# Patient Record
Sex: FEMALE | Race: WHITE | NOT HISPANIC OR LATINO | Employment: UNEMPLOYED | URBAN - NONMETROPOLITAN AREA
[De-identification: names, ages, dates, MRNs, and addresses within clinical notes are randomized per-mention and may not be internally consistent; named-entity substitution may affect disease eponyms.]

---

## 2017-01-17 ENCOUNTER — OFFICE VISIT (OUTPATIENT)
Dept: ORTHOPEDIC SURGERY | Facility: CLINIC | Age: 27
End: 2017-01-17

## 2017-01-17 VITALS — BODY MASS INDEX: 35.28 KG/M2 | WEIGHT: 175 LBS | HEIGHT: 59 IN | RESPIRATION RATE: 16 BRPM

## 2017-01-17 DIAGNOSIS — R52 PAIN: Primary | ICD-10-CM

## 2017-01-17 DIAGNOSIS — S93.492A SPRAIN OF OTHER LIGAMENT OF LEFT ANKLE, INITIAL ENCOUNTER: ICD-10-CM

## 2017-01-17 PROCEDURE — 73610 X-RAY EXAM OF ANKLE: CPT | Performed by: PODIATRIST

## 2017-01-17 PROCEDURE — 73630 X-RAY EXAM OF FOOT: CPT | Performed by: PODIATRIST

## 2017-01-17 PROCEDURE — 99204 OFFICE O/P NEW MOD 45 MIN: CPT | Performed by: PODIATRIST

## 2017-01-17 RX ORDER — MELOXICAM 7.5 MG/1
7.5 TABLET ORAL DAILY
Qty: 30 TABLET | Refills: 2 | Status: SHIPPED | OUTPATIENT
Start: 2017-01-17 | End: 2017-02-22

## 2017-01-17 RX ORDER — CETIRIZINE HYDROCHLORIDE 10 MG/1
TABLET ORAL
Refills: 0 | COMMUNITY
Start: 2016-12-14 | End: 2022-01-21

## 2017-01-17 RX ORDER — METHYLPREDNISOLONE 4 MG/1
TABLET ORAL
Qty: 21 TABLET | Refills: 0 | Status: SHIPPED | OUTPATIENT
Start: 2017-01-17 | End: 2017-02-22

## 2017-01-17 NOTE — PROGRESS NOTES
Subjective Left ankle/foot pain  Patient ID: Enriqueta Gotti is a 26 y.o. female less states that she fell on her steps at home on 1/15/2017 she does remember exactly how she fell she does note her leg gave out and she fell down.  She states she gets a pinch or feeling in her but at times and her entire left leg gives out and she falls.  She was seen in the [facility] emergency department and given crutches and a boot and diclofenac.      History of Present Illness    Review of Systems   Constitutional: Negative for diaphoresis, fever and unexpected weight change.   HENT: Negative for dental problem and sore throat.    Eyes: Negative for visual disturbance.   Respiratory: Negative for shortness of breath.    Cardiovascular: Negative for chest pain.   Gastrointestinal: Negative for abdominal pain, constipation, diarrhea, nausea and vomiting.   Genitourinary: Negative for difficulty urinating and frequency.   Neurological: Negative for headaches.   Hematological: Does not bruise/bleed easily.   All other systems reviewed and are negative.      Past Medical History   Diagnosis Date   • Ankle sprain      right   • Anxiety    • Bipolar disorder    • Carpal tunnel syndrome    • Tear of meniscus of knee      left         Past Surgical History   Procedure Laterality Date   • Cholecystectomy     •  section     • Laparoscopic tubal ligation     • Carpal tunnel release     • Tonsillectomy and adenoidectomy         Allergies   Allergen Reactions   • Codeine    • Penicillins    • Sulfa Antibiotics        Objective   Physical Exam   Constitutional: She is oriented to person, place, and time. She appears well-developed and well-nourished.   HENT:   Head: Normocephalic.   Neurological: She is alert and oriented to person, place, and time.   Psychiatric: She has a normal mood and affect. Her behavior is normal.   Vitals reviewed.    Ortho Exam  Ortho Exam left lower extremity exam  Pedal pulses are  palpable, capillary refill time is brisk gross sensations intact  Manual muscle testing is 4 over 5 due to pain she's not tender over the anterior ankle joint or lateral gutter.  No pain or tenderness at the ATFL.  Her pain seems to be along the course of the peroneal tendons posterior to the fibula and distally towards the fifth metatarsal base.  She also has pain across the arch of the foot along the course of the peroneus longus tendon.  There is some slight pain and weakness with plantarflexion of the first ray.  Assessment/Plan  left ankle sprain, peroneal tendon injury  Independent Review of Radiographic Studies:    3 view x-rays of the left foot and ankle were taken today and compared to 2 days ago in the emergency department.  There is no bony abnormality.  No fracture.  No dislocation there is edema about the lateral foot and ankle on the left side.  Laboratory and Other Studies:     Medical Decision Making:        Procedures  [] No procedures were performed in office today.    Enriqueta was seen today for pain and pain.    Diagnoses and all orders for this visit:    Pain  -     XR Ankle 3+ View Left  -     XR Foot 3+ View Left    Sprain of other ligament of left ankle, initial encounter    Other orders  -     MethylPREDNISolone (MEDROL, LENORE,) 4 MG tablet; Use as directed by package instructions  -     meloxicam (MOBIC) 7.5 MG tablet; Take 1 tablet by mouth Daily.          Recommendations/Plan:  I discussed with her that we will cannot determine the fracture extent of injury to her left side without MRI.  I recommended we keep her in the boot.  Recommend compression with Tubigrip and anti-inflammatories and a Medrol Dosepak.  I want her to elevate AND ICE THIS much as possible.  I'm a keep her off work until next Tuesday and hopes that she can elevate and ice this and begin to recover.  I'll see her back in 2 weeks and reassess her then if there is no significant improvement consider MRI.    Return in about 2  weeks (around 1/31/2017).  Patient agreeable to call or return sooner for any concerns.

## 2017-01-17 NOTE — MR AVS SNAPSHOT
Enriqueta Orozcoch   1/17/2017 10:00 AM   Office Visit    Dept Phone:  [phone]   Encounter #:  [id number]    Provider:  Jax Tinoco DPM   Department:  [facility]                Your Full Care Plan              Today's Medication Changes          These changes are accurate as of: 1/17/17 10:27 AM.  If you have any questions, ask your nurse or doctor.               New Medication(s)Ordered:     meloxicam 7.5 MG tablet   Commonly known as:  MOBIC   Take 1 tablet by mouth Daily.   Started by:  Jax Tinoco DPM       MethylPREDNISolone 4 MG tablet   Commonly known as:  MEDROL (LENORE)   Use as directed by package instructions   Started by:  Jax Tinoco DPM            Where to Get Your Medications      These medications were sent to [address], [address] [phone]  - [phone] [address] [address]     Phone:  [phone]     meloxicam 7.5 MG tablet    MethylPREDNISolone 4 MG tablet                  Your Updated Medication List          This list is accurate as of: 1/17/17 10:27 AM.  Always use your most recent med list.                amitriptyline 25 MG tablet   Commonly known as:  ELAVIL       buPROPion  MG 24 hr tablet   Commonly known as:  WELLBUTRIN XL       cetirizine 10 MG tablet   Commonly known as:  zyrTEC       citalopram 40 MG tablet   Commonly known as:  CeleXA       hydrOXYzine 25 MG tablet   Commonly known as:  ATARAX       meloxicam 7.5 MG tablet   Commonly known as:  MOBIC   Take 1 tablet by mouth Daily.       MethylPREDNISolone 4 MG tablet   Commonly known as:  MEDROL (LENORE)   Use as directed by package instructions       naratriptan 1 MG tablet   Commonly known as:  AMERGE       promethazine 25 MG tablet   Commonly known as:  PHENERGAN       rizatriptan MLT 10 MG disintegrating tablet   Commonly known as:  MAXALT-MLT       SUMAtriptan 20 MG/ACT nasal spray   Commonly known as:  IMITREX               We Performed the Following     XR Ankle 3+ View Left     XR Foot 3+ View Left       You Were Diagnosed With        Codes Comments    Pain    -  Primary ICD-10-CM: R52  ICD-9-CM: 780.96     Sprain of other ligament of left ankle, initial encounter     ICD-10-CM: S93.492A  ICD-9-CM: 845.09       Instructions     None    Patient Instructions History      Upcoming Appointments     Visit Type Date Time Department    NEW PATIENT 2017 10:00 AM [facility]    OFFICE VISIT 2017  2:45 PM [facility]      [employer] Signup     [facility] OneSchool allows you to send messages to your doctor, view your test results, renew your prescriptions, schedule appointments, and more. To sign up, go to nPicker and click on the Sign Up Now link in the New User? box. Enter your OneSchool Activation Code exactly as it appears below along with the last four digits of your Social Security Number and your Date of Birth () to complete the sign-up process. If you do not sign up before the expiration date, you must request a new code.    OneSchool Activation Code: [email or web address]  Expires: 2017 10:25 AM    If you have questions, you can email [email or web address] or call [phone] to talk to our OneSchool staff. Remember, OneSchool is NOT to be used for urgent needs. For medical emergencies, dial 911.               Other Info from Your Visit           Your Appointments     2017  2:45 PM EST   Office Visit with Jax Tinoco DPM   [facility] (--)    [address] [id number] [phone]           Arrive 15 minutes prior to appointment.              Allergies     Codeine      Penicillins      Sulfa Antibiotics        Reason for Visit     Left Foot - Pain     Left Ankle - Pain           Vital Signs     Respirations Height Weight  "Body Mass Index Smoking Status       16 59\" (149.9 cm) 175 lb (79.4 kg) 35.35 kg/m2 Current Every Day Smoker       Problems and Diagnoses Noted     Pain    -  Primary    Sprain of other ligament of left ankle, initial encounter            "

## 2017-01-31 ENCOUNTER — OFFICE VISIT (OUTPATIENT)
Dept: ORTHOPEDIC SURGERY | Facility: CLINIC | Age: 27
End: 2017-01-31

## 2017-01-31 VITALS — BODY MASS INDEX: 34.88 KG/M2 | WEIGHT: 173 LBS | RESPIRATION RATE: 20 BRPM | HEIGHT: 59 IN

## 2017-01-31 DIAGNOSIS — S93.492A SPRAIN OF OTHER LIGAMENT OF LEFT ANKLE, INITIAL ENCOUNTER: Primary | ICD-10-CM

## 2017-01-31 PROCEDURE — 99213 OFFICE O/P EST LOW 20 MIN: CPT | Performed by: PODIATRIST

## 2017-01-31 RX ORDER — PREDNISONE 20 MG/1
TABLET ORAL
COMMUNITY
Start: 2016-12-14 | End: 2017-02-22

## 2017-01-31 RX ORDER — ONDANSETRON 4 MG/1
TABLET, FILM COATED ORAL
COMMUNITY
Start: 2016-11-23 | End: 2017-02-22

## 2017-01-31 RX ORDER — FLUTICASONE PROPIONATE 50 MCG
SPRAY, SUSPENSION (ML) NASAL
COMMUNITY
Start: 2016-12-14 | End: 2017-02-22

## 2017-01-31 RX ORDER — ERGOCALCIFEROL 1.25 MG/1
CAPSULE ORAL
COMMUNITY
Start: 2017-01-26 | End: 2022-02-09

## 2017-01-31 RX ORDER — AZITHROMYCIN 500 MG/1
TABLET, FILM COATED ORAL
COMMUNITY
Start: 2016-12-12 | End: 2017-02-22

## 2017-01-31 RX ORDER — CEPHALEXIN 500 MG/1
CAPSULE ORAL
COMMUNITY
Start: 2016-12-14 | End: 2017-02-22

## 2017-01-31 NOTE — MR AVS SNAPSHOT
Enriqueta Gotti   1/31/2017 2:45 PM   Office Visit    Dept Phone:  [phone]   Encounter #:  [id number]    Provider:  Jax Tinoco DPM   Department:  [facility] MEDICINE                Your Full Care Plan              Your Updated Medication List          This list is accurate as of: 1/31/17  3:09 PM.  Always use your most recent med list.                amitriptyline 25 MG tablet   Commonly known as:  ELAVIL       azithromycin 500 MG tablet   Commonly known as:  ZITHROMAX       buPROPion  MG 24 hr tablet   Commonly known as:  WELLBUTRIN XL       cephalexin 500 MG capsule   Commonly known as:  KEFLEX       cetirizine 10 MG tablet   Commonly known as:  zyrTEC       citalopram 40 MG tablet   Commonly known as:  CeleXA       diclofenac 50 MG EC tablet   Commonly known as:  VOLTAREN       fluticasone 50 MCG/ACT nasal spray   Commonly known as:  FLONASE       hydrOXYzine 25 MG tablet   Commonly known as:  ATARAX       meloxicam 7.5 MG tablet   Commonly known as:  MOBIC   Take 1 tablet by mouth Daily.       MethylPREDNISolone 4 MG tablet   Commonly known as:  MEDROL (LENORE)   Use as directed by package instructions       naratriptan 1 MG tablet   Commonly known as:  AMERGE       ondansetron 4 MG tablet   Commonly known as:  ZOFRAN       predniSONE 20 MG tablet   Commonly known as:  DELTASONE       promethazine 25 MG tablet   Commonly known as:  PHENERGAN       rizatriptan MLT 10 MG disintegrating tablet   Commonly known as:  MAXALT-MLT       SUMAtriptan 20 MG/ACT nasal spray   Commonly known as:  IMITREX       vitamin D 25123 UNITS capsule capsule   Commonly known as:  ERGOCALCIFEROL               You Were Diagnosed With        Codes Comments    Sprain of other ligament of left ankle, initial encounter    -  Primary ICD-10-CM: S93.492A  ICD-9-CM: 845.09       Instructions     None    Patient Instructions History      Upcoming Appointments   "   Visit Type Date Time Department    OFFICE VISIT 2017  2:45 PM [facility]    OFFICE VISIT 2017  1:15 PM [facility]      MyChart Signup     [facility] Whats[employer]x allows you to send messages to your doctor, view your test results, renew your prescriptions, schedule appointments, and more. To sign up, go to Musicane and click on the Sign Up Now link in the New User? box. Enter your Whats[employer] Activation Code exactly as it appears below along with the last four digits of your Social Security Number and your Date of Birth () to complete the sign-up process. If you do not sign up before the expiration date, you must request a new code.    What[employer] Activation Code: [email or web address]  Expires: 2017  3:07 PM    If you have questions, you can email [email or web address] or call [phone] to talk to our Whats[employer] staff. Remember, WhatsN[employer] is NOT to be used for urgent needs. For medical emergencies, dial 911.               Other Info from Your Visit           Your Appointments     2017  1:15 PM EST   Office Visit with Jax Tinoco DPM   [facility] (--)    [address] [place] [address] [phone]           Arrive 15 minutes prior to appointment.              Allergies     Codeine      Penicillins      Sulfa Antibiotics        Reason for Visit     Left Foot - Follow-up, Pain left ankle sprain, peroneal tendon injury      Vital Signs     Respirations Height Weight Body Mass Index Smoking Status       20 59\" (149.9 cm) 173 lb (78.5 kg) 34.94 kg/m2 Current Every Day Smoker       Problems and Diagnoses Noted     Sprain of other ligament of left ankle, initial encounter    -  Primary        "

## 2017-01-31 NOTE — PROGRESS NOTES
Subjective   Patient ID: Enriqueta Gotti is a 26 y.o. female   Follow-up and Pain of the Left Foot (left ankle sprain, peroneal tendon injury)   he comes in with her boot and Tubigrip today on the left lower extremity.  She states it feels somewhat better but she's also been working 12 and 14 hour shifts so as not completely better and difficult to tell.  She's very sleepy and drowsy from work today so getting a good physical exam out of her somewhat difficult as well.    History of Present Illness    Review of Systems   Constitutional: Negative for diaphoresis, fever and unexpected weight change.   HENT: Negative for dental problem and sore throat.    Eyes: Negative for visual disturbance.   Respiratory: Negative for shortness of breath.    Cardiovascular: Negative for chest pain.   Gastrointestinal: Negative for abdominal pain, constipation, diarrhea, nausea and vomiting.   Genitourinary: Negative for difficulty urinating and frequency.   Musculoskeletal: Positive for arthralgias.   Neurological: Negative for headaches.   Hematological: Does not bruise/bleed easily.   All other systems reviewed and are negative.      Past Medical History   Diagnosis Date   • Ankle sprain      right   • Anxiety    • Bipolar disorder    • Carpal tunnel syndrome    • Tear of meniscus of knee      left         Past Surgical History   Procedure Laterality Date   • Cholecystectomy     •  section     • Laparoscopic tubal ligation     • Carpal tunnel release     • Tonsillectomy and adenoidectomy         Allergies   Allergen Reactions   • Codeine    • Penicillins    • Sulfa Antibiotics        Objective  AAO ×3, NAD, AF VSS  Physical Exam   Constitutional: She is oriented to person, place, and time. She appears well-developed and well-nourished.   HENT:   Head: Normocephalic and atraumatic.   Eyes: EOM are normal. Pupils are equal, round, and reactive to light.   Neck: Normal range of motion.   Cardiovascular: Normal rate.     Pulmonary/Chest: Effort normal.   Abdominal: Soft. Bowel sounds are normal.   Musculoskeletal: Normal range of motion.   Neurological: She is alert and oriented to person, place, and time. She has normal reflexes.   Skin: Skin is warm.   Psychiatric: She has a normal mood and affect. Her behavior is normal. Judgment and thought content normal.     Ortho Exam  Ortho Exam  Left Lower extremity exam:: She doesn't seem significantly tender to palpation in the lateral gutter and ATFL today.  There is mild discomfort with range of motion.  Only minimal discomfort with anterior drawer and increased inversion test.  Overall she has some decreased edema and swelling and appears to be somewhat better today.  Ulcer palpable, gross sensations intact, reflexes are normal she is also minimally tender along the course of the peroneals    Assessment/Plan  resolving left ankle sprain of the ATFL, hopeful resolving peroneal tendon injury  Independent Review of Radiographic Studies:      Laboratory and Other Studies:     Medical Decision Making:        Procedures  [] No procedures were performed in office today.    Enriqueta was seen today for follow-up and pain.    Diagnoses and all orders for this visit:    Sprain of other ligament of left ankle, initial encounter        Recommendations/Plan:  I think she is potentially getting better but the fact that she is working so much is also fighting against this.  We discussed that ideally should have a few days off and get her rest this but that's not possible.  She's going to continue working in her boot.  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  We discussed that it does seem like it's a little bit better and improving so as long she is getting better we'll hold off on MRI for just yet I'll see her back in about 23 weeks and see how she's doing at that point and then consider MRI if needed.    Return in about 3 weeks (around 2/21/2017).  Patient agreeable to call or return sooner for any  concerns.

## 2017-02-01 ENCOUNTER — HOSPITAL ENCOUNTER (EMERGENCY)
Facility: HOSPITAL | Age: 27
Discharge: HOME | End: 2017-02-01
Attending: EMERGENCY MEDICINE | Admitting: EMERGENCY MEDICINE

## 2017-02-01 VITALS
WEIGHT: 175 LBS | BODY MASS INDEX: 35.28 KG/M2 | TEMPERATURE: 98.8 F | HEART RATE: 90 BPM | DIASTOLIC BLOOD PRESSURE: 52 MMHG | HEIGHT: 59 IN | RESPIRATION RATE: 18 BRPM | OXYGEN SATURATION: 99 % | SYSTOLIC BLOOD PRESSURE: 92 MMHG

## 2017-02-01 DIAGNOSIS — A08.4 VIRAL GASTROENTERITIS: Primary | ICD-10-CM

## 2017-02-01 LAB
ALBUMIN SERPL-MCNC: 3.9 G/DL (ref 3.5–5)
ALBUMIN/GLOB SERPL: 1.3 G/DL (ref 1.5–2.5)
ALP SERPL-CCNC: 90 U/L (ref 46–116)
ALT SERPL W P-5'-P-CCNC: 126 U/L (ref 10–36)
AMYLASE SERPL-CCNC: 37 U/L (ref 28–100)
ANION GAP SERPL CALCULATED.3IONS-SCNC: 5.4 MMOL/L (ref 3.6–11.2)
AST SERPL-CCNC: 120 U/L (ref 10–30)
B-HCG UR QL: NEGATIVE
BACTERIA UR QL AUTO: ABNORMAL /HPF
BASOPHILS # BLD AUTO: 0.03 10*3/MM3 (ref 0–0.3)
BASOPHILS NFR BLD AUTO: 0.3 % (ref 0–2)
BILIRUB SERPL-MCNC: 0.6 MG/DL (ref 0.2–1.8)
BILIRUB UR QL STRIP: NEGATIVE
BUN BLD-MCNC: 10 MG/DL (ref 7–21)
BUN/CREAT SERPL: 11.8 (ref 7–25)
CALCIUM SPEC-SCNC: 8.7 MG/DL (ref 7.7–10)
CHLORIDE SERPL-SCNC: 109 MMOL/L (ref 99–112)
CLARITY UR: ABNORMAL
CO2 SERPL-SCNC: 28.6 MMOL/L (ref 24.3–31.9)
COLOR UR: ABNORMAL
CREAT BLD-MCNC: 0.85 MG/DL (ref 0.43–1.29)
DEPRECATED RDW RBC AUTO: 41.5 FL (ref 37–54)
EOSINOPHIL # BLD AUTO: 0.1 10*3/MM3 (ref 0–0.7)
EOSINOPHIL NFR BLD AUTO: 1 % (ref 0–5)
ERYTHROCYTE [DISTWIDTH] IN BLOOD BY AUTOMATED COUNT: 13.2 % (ref 11.5–14.5)
GFR SERPL CREATININE-BSD FRML MDRD: 80 ML/MIN/1.73
GLOBULIN UR ELPH-MCNC: 3.1 GM/DL
GLUCOSE BLD-MCNC: 88 MG/DL (ref 70–110)
GLUCOSE UR STRIP-MCNC: NEGATIVE MG/DL
HCT VFR BLD AUTO: 42.4 % (ref 37–47)
HGB BLD-MCNC: 13.7 G/DL (ref 12–16)
HGB UR QL STRIP.AUTO: ABNORMAL
HYALINE CASTS UR QL AUTO: ABNORMAL /LPF
IMM GRANULOCYTES # BLD: 0.02 10*3/MM3 (ref 0–0.03)
IMM GRANULOCYTES NFR BLD: 0.2 % (ref 0–0.5)
KETONES UR QL STRIP: NEGATIVE
LEUKOCYTE ESTERASE UR QL STRIP.AUTO: ABNORMAL
LIPASE SERPL-CCNC: 33 U/L (ref 13–60)
LYMPHOCYTES # BLD AUTO: 2.66 10*3/MM3 (ref 1–3)
LYMPHOCYTES NFR BLD AUTO: 27.6 % (ref 21–51)
MCH RBC QN AUTO: 28.2 PG (ref 27–33)
MCHC RBC AUTO-ENTMCNC: 32.3 G/DL (ref 33–37)
MCV RBC AUTO: 87.2 FL (ref 80–94)
MONOCYTES # BLD AUTO: 0.82 10*3/MM3 (ref 0.1–0.9)
MONOCYTES NFR BLD AUTO: 8.5 % (ref 0–10)
NEUTROPHILS # BLD AUTO: 6.01 10*3/MM3 (ref 1.4–6.5)
NEUTROPHILS NFR BLD AUTO: 62.4 % (ref 30–70)
NITRITE UR QL STRIP: NEGATIVE
OSMOLALITY SERPL CALC.SUM OF ELEC: 283.4 MOSM/KG (ref 273–305)
PH UR STRIP.AUTO: <=5 [PH] (ref 5–8)
PLATELET # BLD AUTO: 245 10*3/MM3 (ref 130–400)
PMV BLD AUTO: 10.4 FL (ref 6–10)
POTASSIUM BLD-SCNC: 3.9 MMOL/L (ref 3.5–5.3)
PROT SERPL-MCNC: 7 G/DL (ref 6–8)
PROT UR QL STRIP: NEGATIVE
RBC # BLD AUTO: 4.86 10*6/MM3 (ref 4.2–5.4)
RBC # UR: ABNORMAL /HPF
REF LAB TEST METHOD: ABNORMAL
SODIUM BLD-SCNC: 143 MMOL/L (ref 135–153)
SP GR UR STRIP: 1.03 (ref 1–1.03)
SQUAMOUS #/AREA URNS HPF: ABNORMAL /HPF
UROBILINOGEN UR QL STRIP: ABNORMAL
WBC NRBC COR # BLD: 9.64 10*3/MM3 (ref 4.5–12.5)
WBC UR QL AUTO: ABNORMAL /HPF

## 2017-02-01 PROCEDURE — 80053 COMPREHEN METABOLIC PANEL: CPT | Performed by: PHYSICIAN ASSISTANT

## 2017-02-01 PROCEDURE — 82150 ASSAY OF AMYLASE: CPT | Performed by: PHYSICIAN ASSISTANT

## 2017-02-01 PROCEDURE — 81025 URINE PREGNANCY TEST: CPT | Performed by: PHYSICIAN ASSISTANT

## 2017-02-01 PROCEDURE — 85025 COMPLETE CBC W/AUTO DIFF WBC: CPT | Performed by: PHYSICIAN ASSISTANT

## 2017-02-01 PROCEDURE — 83690 ASSAY OF LIPASE: CPT | Performed by: PHYSICIAN ASSISTANT

## 2017-02-01 PROCEDURE — 36415 COLL VENOUS BLD VENIPUNCTURE: CPT

## 2017-02-01 PROCEDURE — 96365 THER/PROPH/DIAG IV INF INIT: CPT

## 2017-02-01 PROCEDURE — 87086 URINE CULTURE/COLONY COUNT: CPT | Performed by: PHYSICIAN ASSISTANT

## 2017-02-01 PROCEDURE — 99283 EMERGENCY DEPT VISIT LOW MDM: CPT

## 2017-02-01 PROCEDURE — 96375 TX/PRO/DX INJ NEW DRUG ADDON: CPT

## 2017-02-01 PROCEDURE — 81001 URINALYSIS AUTO W/SCOPE: CPT | Performed by: PHYSICIAN ASSISTANT

## 2017-02-01 PROCEDURE — 25010000002 PROMETHAZINE PER 50 MG: Performed by: PHYSICIAN ASSISTANT

## 2017-02-01 RX ORDER — PANTOPRAZOLE SODIUM 40 MG/10ML
40 INJECTION, POWDER, LYOPHILIZED, FOR SOLUTION INTRAVENOUS ONCE
Status: COMPLETED | OUTPATIENT
Start: 2017-02-01 | End: 2017-02-01

## 2017-02-01 RX ORDER — SODIUM CHLORIDE 0.9 % (FLUSH) 0.9 %
10 SYRINGE (ML) INJECTION AS NEEDED
Status: DISCONTINUED | OUTPATIENT
Start: 2017-02-01 | End: 2017-02-01 | Stop reason: HOSPADM

## 2017-02-01 RX ORDER — ONDANSETRON 4 MG/1
4 TABLET, ORALLY DISINTEGRATING ORAL EVERY 6 HOURS PRN
Qty: 30 TABLET | Refills: 0 | Status: SHIPPED | OUTPATIENT
Start: 2017-02-01 | End: 2017-02-22

## 2017-02-01 RX ADMIN — PROMETHAZINE HYDROCHLORIDE 25 MG: 25 INJECTION INTRAMUSCULAR; INTRAVENOUS at 16:50

## 2017-02-01 RX ADMIN — SODIUM CHLORIDE 1000 ML: 9 INJECTION, SOLUTION INTRAVENOUS at 16:50

## 2017-02-01 RX ADMIN — PANTOPRAZOLE SODIUM 40 MG: 40 INJECTION, POWDER, FOR SOLUTION INTRAVENOUS at 16:51

## 2017-02-01 NOTE — ED NOTES
Pt resting quietly on stretcher with no complaints.  Pt AAOx4 with no resp distress noted.  Pt denies any needs at this time.  Skin PWD.  Pt family at bedside. Will continue to monitor and follow plan of care.     Afshan Newman RN  02/01/17 9046

## 2017-02-01 NOTE — ED NOTES
Pt discharged home with family, by wheelchair, AAOx3 with NADN.       Afshan Newman RN  02/01/17 6229

## 2017-02-01 NOTE — ED PROVIDER NOTES
Subjective   Patient is a 26 y.o. female presenting with abdominal pain.   History provided by:  Patient  Abdominal Pain   Pain location:  Epigastric and suprapubic  Pain quality: gnawing    Pain radiates to:  Does not radiate  Pain severity:  Mild  Onset quality:  Sudden  Duration:  1 day  Timing:  Constant  Progression:  Waxing and waning  Chronicity:  New  Relieved by:  None tried  Ineffective treatments:  None tried  Associated symptoms: diarrhea, nausea and vomiting    Associated symptoms: no chest pain, no dysuria and no fever        Review of Systems   Constitutional: Negative.  Negative for fever.   HENT: Negative.    Respiratory: Negative.    Cardiovascular: Negative.  Negative for chest pain.   Gastrointestinal: Positive for abdominal pain, diarrhea, nausea and vomiting.   Endocrine: Negative.    Genitourinary: Negative.  Negative for dysuria.   Skin: Negative.    Neurological: Negative.    Psychiatric/Behavioral: Negative.    All other systems reviewed and are negative.      Past Medical History   Diagnosis Date   • Ankle sprain      right   • Anxiety    • Bipolar disorder    • Carpal tunnel syndrome    • Tear of meniscus of knee      left        Allergies   Allergen Reactions   • Codeine    • Penicillins    • Sulfa Antibiotics        Past Surgical History   Procedure Laterality Date   • Cholecystectomy     •  section     • Laparoscopic tubal ligation     • Carpal tunnel release     • Tonsillectomy and adenoidectomy         Family History   Problem Relation Age of Onset   • Osteoarthritis Other    • Osteoporosis Other    • Heart disease Other    • Hypertension Other    • Asthma Other    • Diabetes Other    • Kidney disease Other    • Stroke Other        Social History     Social History   • Marital status:      Spouse name: N/A   • Number of children: N/A   • Years of education: N/A     Social History Main Topics   • Smoking status: Current Every Day Smoker     Packs/day: 0.50     Years:  1.00     Types: Cigarettes     Start date: 1/17/2016   • Smokeless tobacco: None   • Alcohol use Yes      Comment: rarely   • Drug use: No   • Sexual activity: Defer     Other Topics Concern   • None     Social History Narrative           Objective   Physical Exam   Constitutional: She is oriented to person, place, and time. She appears well-developed and well-nourished. No distress.   HENT:   Head: Normocephalic and atraumatic.   Nose: Nose normal.   Eyes: Conjunctivae and EOM are normal. Pupils are equal, round, and reactive to light.   Neck: Normal range of motion. Neck supple. No JVD present. No tracheal deviation present.   Cardiovascular: Normal rate, regular rhythm and normal heart sounds.    No murmur heard.  Pulmonary/Chest: Effort normal and breath sounds normal. No respiratory distress. She has no wheezes.   Abdominal: Soft. Bowel sounds are normal. There is tenderness.   Epigastric / suprapubic.    Musculoskeletal: Normal range of motion. She exhibits no edema or deformity.   Neurological: She is alert and oriented to person, place, and time. No cranial nerve deficit.   Skin: Skin is warm and dry. No rash noted. She is not diaphoretic. No erythema. No pallor.   Psychiatric: She has a normal mood and affect. Her behavior is normal. Thought content normal.   Nursing note and vitals reviewed.      Procedures         ED Course  ED Course                  MDM  Number of Diagnoses or Management Options  Viral gastroenteritis: minor     Amount and/or Complexity of Data Reviewed  Clinical lab tests: ordered and reviewed    Risk of Complications, Morbidity, and/or Mortality  Presenting problems: low  Diagnostic procedures: low  Management options: low    Patient Progress  Patient progress: stable      Final diagnoses:   Viral gastroenteritis            PUJA Richards  02/01/17 9350

## 2017-02-01 NOTE — ED NOTES
Pt reports that she has had n/v/d with abd pain x a couple days.  Pt family at bedside.     Afshan Newman RN  02/01/17 1287

## 2017-02-04 LAB — BACTERIA SPEC AEROBE CULT: NORMAL

## 2017-02-22 ENCOUNTER — OFFICE VISIT (OUTPATIENT)
Dept: PSYCHIATRY | Facility: CLINIC | Age: 27
End: 2017-02-22

## 2017-02-22 VITALS
WEIGHT: 180 LBS | SYSTOLIC BLOOD PRESSURE: 122 MMHG | BODY MASS INDEX: 37.79 KG/M2 | DIASTOLIC BLOOD PRESSURE: 83 MMHG | HEIGHT: 58 IN | HEART RATE: 84 BPM

## 2017-02-22 DIAGNOSIS — Z79.899 LONG TERM USE OF DRUG: Primary | ICD-10-CM

## 2017-02-22 DIAGNOSIS — F60.9 PERSONALITY DISORDER, UNSPECIFIED (HCC): ICD-10-CM

## 2017-02-22 DIAGNOSIS — F41.9 ANXIETY DISORDER, UNSPECIFIED TYPE: ICD-10-CM

## 2017-02-22 DIAGNOSIS — F33.1 MAJOR DEPRESSIVE DISORDER, RECURRENT EPISODE, MODERATE (HCC): ICD-10-CM

## 2017-02-22 LAB
AMPHETAMINE CUT-OFF: NORMAL
BENZODIAZIPINE CUT-OFF: NORMAL
BUPRENORPHINE CUT-OFF: NORMAL
COCAINE CUT-OFF: NORMAL
EXTERNAL AMPHETAMINE SCREEN URINE: NEGATIVE
EXTERNAL BENZODIAZEPINE SCREEN URINE: NEGATIVE
EXTERNAL BUPRENORPHINE SCREEN URINE: NEGATIVE
EXTERNAL COCAINE SCREEN URINE: NEGATIVE
EXTERNAL MDMA: NEGATIVE
EXTERNAL METHADONE SCREEN URINE: NEGATIVE
EXTERNAL METHAMPHETAMINE SCREEN URINE: NEGATIVE
EXTERNAL OPIATES SCREEN URINE: NEGATIVE
EXTERNAL OXYCODONE SCREEN URINE: NEGATIVE
EXTERNAL THC SCREEN URINE: NEGATIVE
MDMA CUT-OFF: NORMAL
METHADONE CUT-OFF: NORMAL
METHAMPHETAMINE CUT-OFF: NORMAL
OPIATES CUT-OFF: NORMAL
OXYCODONE CUT-OFF: NORMAL
THC CUT-OFF: NORMAL

## 2017-02-22 PROCEDURE — 90792 PSYCH DIAG EVAL W/MED SRVCS: CPT | Performed by: NURSE PRACTITIONER

## 2017-02-22 RX ORDER — MIRTAZAPINE 15 MG/1
15 TABLET, FILM COATED ORAL NIGHTLY
Qty: 30 TABLET | Refills: 0 | Status: SHIPPED | OUTPATIENT
Start: 2017-02-22 | End: 2017-03-21

## 2017-02-22 RX ORDER — CHLORAL HYDRATE 500 MG
CAPSULE ORAL
COMMUNITY
Start: 2017-02-09 | End: 2022-02-09

## 2017-02-22 RX ORDER — BUSPIRONE HYDROCHLORIDE 5 MG/1
5 TABLET ORAL 2 TIMES DAILY
Qty: 60 TABLET | Refills: 0 | Status: SHIPPED | OUTPATIENT
Start: 2017-02-22 | End: 2017-03-21 | Stop reason: SDUPTHER

## 2017-02-22 RX ORDER — MILNACIPRAN HYDROCHLORIDE 50 MG/1
TABLET, FILM COATED ORAL
COMMUNITY
Start: 2017-02-21 | End: 2017-03-21
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
## 2017-03-14 ENCOUNTER — OFFICE VISIT (OUTPATIENT)
Dept: PSYCHIATRY | Facility: CLINIC | Age: 27
End: 2017-03-14

## 2017-03-14 DIAGNOSIS — F41.9 ANXIETY DISORDER, UNSPECIFIED TYPE: ICD-10-CM

## 2017-03-14 DIAGNOSIS — F60.9 PERSONALITY DISORDER, UNSPECIFIED (HCC): ICD-10-CM

## 2017-03-14 DIAGNOSIS — F33.1 MAJOR DEPRESSIVE DISORDER, RECURRENT EPISODE, MODERATE (HCC): Primary | ICD-10-CM

## 2017-03-14 PROCEDURE — 90837 PSYTX W PT 60 MINUTES: CPT | Performed by: COUNSELOR
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
## 2017-03-21 ENCOUNTER — OFFICE VISIT (OUTPATIENT)
Dept: PSYCHIATRY | Facility: CLINIC | Age: 27
End: 2017-03-21

## 2017-03-21 VITALS
HEIGHT: 58 IN | BODY MASS INDEX: 38.83 KG/M2 | HEART RATE: 80 BPM | DIASTOLIC BLOOD PRESSURE: 72 MMHG | WEIGHT: 185 LBS | SYSTOLIC BLOOD PRESSURE: 113 MMHG

## 2017-03-21 DIAGNOSIS — F41.9 ANXIETY DISORDER, UNSPECIFIED TYPE: ICD-10-CM

## 2017-03-21 DIAGNOSIS — F60.9 PERSONALITY DISORDER, UNSPECIFIED (HCC): ICD-10-CM

## 2017-03-21 DIAGNOSIS — F33.1 MAJOR DEPRESSIVE DISORDER, RECURRENT EPISODE, MODERATE (HCC): Primary | ICD-10-CM

## 2017-03-21 PROCEDURE — 99213 OFFICE O/P EST LOW 20 MIN: CPT | Performed by: NURSE PRACTITIONER

## 2017-03-21 RX ORDER — BUSPIRONE HYDROCHLORIDE 5 MG/1
5 TABLET ORAL 2 TIMES DAILY
Qty: 60 TABLET | Refills: 0 | Status: SHIPPED | OUTPATIENT
Start: 2017-03-21 | End: 2017-04-18 | Stop reason: SDUPTHER

## 2017-03-21 RX ORDER — VILAZODONE HYDROCHLORIDE 10 MG/1
5 TABLET ORAL DAILY
Qty: 15 TABLET | Refills: 0 | Status: SHIPPED | OUTPATIENT
Start: 2017-03-21 | End: 2017-04-18 | Stop reason: SDUPTHER

## 2017-03-21 NOTE — PROGRESS NOTES
Subjective   Enriqueta Gotti is a 26 y.o. female is here today for medication management follow-up at the [facility], she presents to her appointment a day early but was able to be seen.    Chief Complaint: Follow-up        History of Present Illness  She states that she can not take the remeron because it is making her too sleepy and she has responsibilities.  She is happy that it makes her sleep but she can't do it.  She states that she has been taking care of her parents who ill and has to be able to respond when needed.  Discussed possibly taking a low dose of melatonin to assist with sleep, discussed taking about 6-7pm to allow increase with body's natural rise in melatonin.  Patient acknowledged. She states that the buspar has been helpful but she is still snappy, gets tired after the 2nd dose around 4 pm.  Recommended that she continue to take and allow her body to get use to it.  Also, reviewed Genesight and patient is agreeable to a trial of Viibryd which is noted to be compatible with her DNA.  She is no longer taking the savella because of side effects.  She reports that her depression and anxiety depends on the day.  She has been eating ok with weight gain noted, however she recently went on vacation to [place] which could have contributed to weight gain.  She is excited because her 5 yo son (Tony) was able to stay at her house with crying but she is worried about him because he has uncontrolled asthma.  Denies any recent illness.  Denies any AV hallucinations, denies any SI/HI.         The following portions of the patient's history were reviewed and updated as appropriate: allergies, current medications, past family history, past medical history, past social history, past surgical history and problem list.    Review of Systems   Constitutional: Negative for appetite change, chills, diaphoresis, fatigue, fever and unexpected weight change.   HENT: Negative for hearing loss, sore throat,  "trouble swallowing and voice change.    Eyes: Negative for photophobia and visual disturbance.   Respiratory: Negative for cough, chest tightness and shortness of breath.    Cardiovascular: Negative for chest pain and palpitations.   Gastrointestinal: Negative for abdominal pain, constipation, nausea and vomiting.   Endocrine: Negative for cold intolerance and heat intolerance.   Genitourinary: Negative for dysuria and frequency.   Musculoskeletal: Negative for arthralgias, back pain, joint swelling and neck stiffness.   Skin: Negative for color change and wound.   Allergic/Immunologic: Negative for environmental allergies and immunocompromised state.   Neurological: Negative for dizziness, tremors, seizures, syncope, weakness, light-headedness and headaches.   Hematological: Negative for adenopathy. Does not bruise/bleed easily.       Objective   Physical Exam   Constitutional: She appears well-developed and well-nourished. No distress.   Neurological: She is alert. Coordination and gait normal.   Vitals reviewed.    Blood pressure 113/72, pulse 80, height 58\" (147.3 cm), weight 185 lb (83.9 kg).    Medication List:   Current Outpatient Prescriptions   Medication Sig Dispense Refill   • busPIRone (BUSPAR) 5 MG tablet Take 1 tablet by mouth 2 (Two) Times a Day. 60 tablet 0   • cetirizine (zyrTEC) 10 MG tablet take 1 tablet by mouth once daily  0   • Omega-3 Fatty Acids (FISH OIL) 1000 MG capsule capsule      • vitamin D (ERGOCALCIFEROL) 82110 UNITS capsule capsule      • vilazodone (VIIBRYD) 10 MG tablet tablet Take 0.5 tablets by mouth Daily. 15 tablet 0     No current facility-administered medications for this visit.        Mental Status Exam:   Hygiene:   fair  Cooperation:  Cooperative  Eye Contact:  Fair  Psychomotor Behavior:  Appropriate  Affect:  Appropriate  Hopelessness: Denies  Speech:  Normal  Thought Process:  Linear  Thought Content:  Normal  Suicidal:  None  Homicidal:  None  Hallucinations:  " None  Delusion:  None  Memory:  Intact  Orientation:  Person, Place, Time and Situation  Reliability:  fair  Insight:  Fair  Judgement:  Fair  Impulse Control:  Fair  Physical/Medical Issues:  No     Assessment/Plan   Diagnoses and all orders for this visit:    Major depressive disorder, recurrent episode, moderate    Anxiety disorder, unspecified type    Personality disorder, unspecified    Other orders  -     busPIRone (BUSPAR) 5 MG tablet; Take 1 tablet by mouth 2 (Two) Times a Day.  -     vilazodone (VIIBRYD) 10 MG tablet tablet; Take 0.5 tablets by mouth Daily.          Discussed medication options. Discontinue remeron- too sedating at this time, may look at restarting in the future for sleep.  Begin viibryd for depression and anxiety and continue buspar.  Jumiaight reviewed and placed in media.  Reviewed the risks, benefits, and side effects of the medications; patient acknowledged and verbally consented.  Patient is agreeable to call the [facility].  Patient is aware to call 911 or go to the nearest ER should begin having SI/HI.     Return in 4 weeks

## 2017-04-11 ENCOUNTER — OFFICE VISIT (OUTPATIENT)
Dept: PSYCHIATRY | Facility: CLINIC | Age: 27
End: 2017-04-11

## 2017-04-11 ENCOUNTER — HOSPITAL ENCOUNTER (EMERGENCY)
Facility: HOSPITAL | Age: 27
Discharge: HOME | End: 2017-04-11
Attending: EMERGENCY MEDICINE | Admitting: EMERGENCY MEDICINE

## 2017-04-11 VITALS
HEIGHT: 59 IN | TEMPERATURE: 98.2 F | DIASTOLIC BLOOD PRESSURE: 78 MMHG | RESPIRATION RATE: 16 BRPM | SYSTOLIC BLOOD PRESSURE: 116 MMHG | OXYGEN SATURATION: 98 % | WEIGHT: 180 LBS | HEART RATE: 82 BPM | BODY MASS INDEX: 36.29 KG/M2

## 2017-04-11 DIAGNOSIS — F60.9 PERSONALITY DISORDER, UNSPECIFIED (HCC): ICD-10-CM

## 2017-04-11 DIAGNOSIS — F41.9 ANXIETY DISORDER, UNSPECIFIED TYPE: ICD-10-CM

## 2017-04-11 DIAGNOSIS — F33.1 MAJOR DEPRESSIVE DISORDER, RECURRENT EPISODE, MODERATE (HCC): Primary | ICD-10-CM

## 2017-04-11 DIAGNOSIS — J06.9 VIRAL URI: ICD-10-CM

## 2017-04-11 DIAGNOSIS — S76.011A HIP STRAIN, RIGHT, INITIAL ENCOUNTER: Primary | ICD-10-CM

## 2017-04-11 PROCEDURE — 99283 EMERGENCY DEPT VISIT LOW MDM: CPT

## 2017-04-11 PROCEDURE — 90834 PSYTX W PT 45 MINUTES: CPT | Performed by: COUNSELOR

## 2017-04-11 PROCEDURE — 25010000002 KETOROLAC TROMETHAMINE PER 15 MG: Performed by: PHYSICIAN ASSISTANT

## 2017-04-11 PROCEDURE — 96374 THER/PROPH/DIAG INJ IV PUSH: CPT

## 2017-04-11 RX ORDER — ORPHENADRINE CITRATE 100 MG/1
100 TABLET, EXTENDED RELEASE ORAL 2 TIMES DAILY
Qty: 10 TABLET | Refills: 0 | Status: SHIPPED | OUTPATIENT
Start: 2017-04-11 | End: 2017-04-18 | Stop reason: ALTCHOICE

## 2017-04-11 RX ORDER — GUAIFENESIN 200 MG/10ML
200 LIQUID ORAL EVERY 4 HOURS PRN
Qty: 120 ML | Refills: 0 | Status: SHIPPED | OUTPATIENT
Start: 2017-04-11 | End: 2017-04-18

## 2017-04-11 RX ORDER — KETOROLAC TROMETHAMINE 10 MG/1
10 TABLET, FILM COATED ORAL EVERY 6 HOURS PRN
Qty: 10 TABLET | Refills: 0 | Status: SHIPPED | OUTPATIENT
Start: 2017-04-11 | End: 2017-04-18

## 2017-04-11 RX ORDER — KETOROLAC TROMETHAMINE 30 MG/ML
30 INJECTION, SOLUTION INTRAMUSCULAR; INTRAVENOUS EVERY 6 HOURS PRN
Status: DISCONTINUED | OUTPATIENT
Start: 2017-04-11 | End: 2017-04-11 | Stop reason: HOSPADM

## 2017-04-11 RX ADMIN — KETOROLAC TROMETHAMINE 30 MG: 30 INJECTION, SOLUTION INTRAMUSCULAR at 12:06

## 2017-04-11 NOTE — ED PROVIDER NOTES
Subjective   Patient is a 26 y.o. female presenting with lower extremity pain and URI.   History provided by:  Patient   used: No    Lower Extremity Issue   Location:  Hip  Time since incident:  2 days  Injury: no    Hip location:  R hip  Pain details:     Quality:  Aching    Severity:  Moderate    Onset quality:  Sudden    Duration:  2 days    Timing:  Constant    Progression:  Worsening  Chronicity:  New  Dislocation: no    Foreign body present:  No foreign bodies  Tetanus status:  Unknown  Prior injury to area:  No  Relieved by:  None tried  Worsened by:  Nothing  Ineffective treatments:  None tried  Associated symptoms: no back pain, no decreased ROM, no fatigue, no fever, no itching, no neck pain, no numbness and no swelling    Risk factors: no concern for non-accidental trauma and no frequent fractures    URI   Presenting symptoms: congestion and cough    Presenting symptoms: no fatigue and no fever    Severity:  Moderate  Onset quality:  Sudden  Duration:  2 days  Timing:  Constant  Progression:  Worsening  Chronicity:  New  Relieved by:  Nothing  Worsened by:  Nothing  Associated symptoms: no myalgias and no neck pain    Risk factors: not elderly, no chronic cardiac disease, no chronic kidney disease, no immunosuppression and no recent illness        Review of Systems   Constitutional: Negative for fatigue and fever.   HENT: Positive for congestion.    Respiratory: Positive for cough.    Musculoskeletal: Positive for joint swelling. Negative for back pain, myalgias and neck pain.   Skin: Negative for itching.   All other systems reviewed and are negative.      Past Medical History:   Diagnosis Date   • Ankle sprain     right   • Anxiety    • Bipolar disorder    • Carpal tunnel syndrome    • Chronic pain disorder     Fibroymyalgia   • Tear of meniscus of knee     left        Allergies   Allergen Reactions   • Codeine    • Penicillins    • Sulfa Antibiotics        Past Surgical History:    Procedure Laterality Date   • CARPAL TUNNEL RELEASE     •  SECTION     • CHOLECYSTECTOMY     • LAPAROSCOPIC TUBAL LIGATION     • TONSILLECTOMY AND ADENOIDECTOMY     • TUBAL ABDOMINAL LIGATION         Family History   Problem Relation Age of Onset   • Osteoarthritis Other    • Osteoporosis Other    • Heart disease Other    • Hypertension Other    • Asthma Other    • Diabetes Other    • Kidney disease Other    • Stroke Other        Social History     Social History   • Marital status:      Spouse name: N/A   • Number of children: N/A   • Years of education: N/A     Social History Main Topics   • Smoking status: Current Every Day Smoker     Packs/day: 0.50     Years: 1.00     Types: Cigarettes     Start date: 2016   • Smokeless tobacco: Never Used   • Alcohol use Yes      Comment: rarely   • Drug use: No   • Sexual activity: Defer     Other Topics Concern   • None     Social History Narrative   • None           Objective   Physical Exam   Constitutional: She is oriented to person, place, and time. She appears well-developed and well-nourished.   HENT:   Head: Normocephalic and atraumatic.   Right Ear: External ear normal.   Left Ear: External ear normal.   Nose: Nose normal.   Mouth/Throat: Oropharynx is clear and moist.   Eyes: Conjunctivae and EOM are normal. Pupils are equal, round, and reactive to light.   Neck: Normal range of motion. Neck supple. No tracheal deviation present. No thyromegaly present.   Cardiovascular: Normal rate, regular rhythm, normal heart sounds and intact distal pulses.    Pulmonary/Chest: Effort normal and breath sounds normal.   Abdominal: Soft. Bowel sounds are normal. She exhibits no distension. There is no tenderness.   Musculoskeletal: She exhibits tenderness.        Right hip: She exhibits decreased range of motion and decreased strength. She exhibits no tenderness and no bony tenderness.        Legs:  Neurological: She is alert and oriented to person, place, and  time.   Skin: Skin is warm and dry. No rash noted. No erythema. No pallor.   Psychiatric: She has a normal mood and affect. Her behavior is normal. Judgment and thought content normal.   Nursing note and vitals reviewed.      Procedures         ED Course  ED Course   Comment By Time   18-year-old female comes in with chief complaint right hip pain ×2 days.  Patient states that she had a charley horse yesterday and has had pain in her right upper hip since the injury.  Patient has taken Tylenol and Advil without relief.  She also reports she has had nasal congestion and mild cough for the past 2 days. Denies any fever, chills. She has had tubal ligation. Jules Ricardo PA-C 04/11 1159                  [facility]    Final diagnoses:   Hip strain, right, initial encounter   Viral URI            Jules Ricardo PA-C  04/11/17 1206

## 2017-04-11 NOTE — ED NOTES
Pt states that she woke up yesterday morning with a libby horse yesterday and as the day went on the pain went all the way into pelvis pt states that her right leg just hasn't been the same since yesterday and she thought today it would be better but it has not improved pt states that she did take tylenol and advil yesterday but it didn't really help pt states that she has not taken anything today     Ericka Banda RN  04/11/17 0401
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
## 2017-04-13 ENCOUNTER — APPOINTMENT (OUTPATIENT)
Dept: GENERAL RADIOLOGY | Facility: HOSPITAL | Age: 27
End: 2017-04-13

## 2017-04-13 ENCOUNTER — HOSPITAL ENCOUNTER (EMERGENCY)
Facility: HOSPITAL | Age: 27
Discharge: HOME | End: 2017-04-13
Attending: EMERGENCY MEDICINE | Admitting: EMERGENCY MEDICINE

## 2017-04-13 VITALS
OXYGEN SATURATION: 96 % | TEMPERATURE: 98.3 F | BODY MASS INDEX: 36.29 KG/M2 | HEART RATE: 86 BPM | WEIGHT: 180 LBS | SYSTOLIC BLOOD PRESSURE: 105 MMHG | RESPIRATION RATE: 16 BRPM | DIASTOLIC BLOOD PRESSURE: 66 MMHG | HEIGHT: 59 IN

## 2017-04-13 DIAGNOSIS — J40 BRONCHITIS: Primary | ICD-10-CM

## 2017-04-13 LAB
FLUAV AG NPH QL: NEGATIVE
FLUBV AG NPH QL IA: NEGATIVE
S PYO AG THROAT QL: NEGATIVE

## 2017-04-13 PROCEDURE — 87880 STREP A ASSAY W/OPTIC: CPT | Performed by: PHYSICIAN ASSISTANT

## 2017-04-13 PROCEDURE — 87081 CULTURE SCREEN ONLY: CPT | Performed by: PHYSICIAN ASSISTANT

## 2017-04-13 PROCEDURE — 71020 HC CHEST PA AND LATERAL: CPT

## 2017-04-13 PROCEDURE — 87804 INFLUENZA ASSAY W/OPTIC: CPT | Performed by: PHYSICIAN ASSISTANT

## 2017-04-13 PROCEDURE — 99283 EMERGENCY DEPT VISIT LOW MDM: CPT

## 2017-04-13 RX ORDER — ALBUTEROL SULFATE 90 UG/1
2 AEROSOL, METERED RESPIRATORY (INHALATION) EVERY 6 HOURS PRN
Qty: 3.7 G | Refills: 0 | Status: SHIPPED | OUTPATIENT
Start: 2017-04-13 | End: 2022-02-09

## 2017-04-13 RX ORDER — GUAIFENESIN 200 MG/10ML
200 LIQUID ORAL EVERY 4 HOURS PRN
Qty: 120 ML | Refills: 0 | Status: SHIPPED | OUTPATIENT
Start: 2017-04-13 | End: 2017-04-18

## 2017-04-13 RX ORDER — DOXYCYCLINE 100 MG/1
100 CAPSULE ORAL 2 TIMES DAILY
Qty: 20 CAPSULE | Refills: 0 | Status: SHIPPED | OUTPATIENT
Start: 2017-04-13 | End: 2017-05-16

## 2017-04-13 NOTE — ED PROVIDER NOTES
Subjective   Patient is a 26 y.o. female presenting with URI.   History provided by:  Patient   used: No    URI   Presenting symptoms: congestion, cough, rhinorrhea and sore throat    Presenting symptoms: no facial pain    Severity:  Moderate  Onset quality:  Sudden  Duration:  2 days  Timing:  Constant  Progression:  Worsening  Chronicity:  New  Relieved by:  Nothing  Worsened by:  Nothing  Associated symptoms: myalgias    Associated symptoms: no arthralgias, no headaches, no neck pain and no sinus pain    Risk factors: not elderly, no chronic cardiac disease, no immunosuppression, no recent illness and no recent travel        Review of Systems   HENT: Positive for congestion, rhinorrhea and sore throat.    Respiratory: Positive for cough.    Musculoskeletal: Positive for myalgias. Negative for arthralgias and neck pain.   Neurological: Negative for headaches.   All other systems reviewed and are negative.      Past Medical History:   Diagnosis Date   • Ankle sprain     right   • Anxiety    • Bipolar disorder    • Carpal tunnel syndrome    • Chronic pain disorder     Fibroymyalgia   • Tear of meniscus of knee     left        Allergies   Allergen Reactions   • Codeine    • Penicillins    • Sulfa Antibiotics        Past Surgical History:   Procedure Laterality Date   • CARPAL TUNNEL RELEASE     •  SECTION     • CHOLECYSTECTOMY     • LAPAROSCOPIC TUBAL LIGATION     • TONSILLECTOMY AND ADENOIDECTOMY     • TUBAL ABDOMINAL LIGATION         Family History   Problem Relation Age of Onset   • Osteoarthritis Other    • Osteoporosis Other    • Heart disease Other    • Hypertension Other    • Asthma Other    • Diabetes Other    • Kidney disease Other    • Stroke Other        Social History     Social History   • Marital status:      Spouse name: N/A   • Number of children: N/A   • Years of education: N/A     Social History Main Topics   • Smoking status: Current Every Day Smoker      Packs/day: 0.50     Years: 1.00     Types: Cigarettes     Start date: 1/17/2016   • Smokeless tobacco: Never Used   • Alcohol use Yes      Comment: rarely   • Drug use: No   • Sexual activity: Defer     Other Topics Concern   • None     Social History Narrative           Objective   Physical Exam   Constitutional: She is oriented to person, place, and time. She appears well-developed and well-nourished.   HENT:   Head: Normocephalic.   Right Ear: External ear normal.   Left Ear: External ear normal.   Nose: Nose normal.   Mouth/Throat: Oropharynx is clear and moist.   Eyes: Conjunctivae and EOM are normal. Pupils are equal, round, and reactive to light.   Neck: Normal range of motion. Neck supple. No tracheal deviation present. No thyromegaly present.   Cardiovascular: Normal rate, regular rhythm, normal heart sounds and intact distal pulses.    Pulmonary/Chest: Effort normal and breath sounds normal.   Abdominal: Soft. Bowel sounds are normal.   Musculoskeletal: Normal range of motion.   Neurological: She is alert and oriented to person, place, and time. She has normal reflexes.   Skin: Skin is warm and dry.   Psychiatric: She has a normal mood and affect. Her behavior is normal. Judgment and thought content normal.   Nursing note and vitals reviewed.      Procedures         ED Course  ED Course                  MDM  Number of Diagnoses or Management Options  Bronchitis: new and requires workup     Amount and/or Complexity of Data Reviewed  Tests in the radiology section of CPT®: ordered and reviewed  Independent visualization of images, tracings, or specimens: yes    Risk of Complications, Morbidity, and/or Mortality  Presenting problems: moderate  Diagnostic procedures: moderate  Management options: moderate    Patient Progress  Patient progress: stable      Final diagnoses:   Bronchitis            Jules Ricardo PA-C  04/13/17 [id number]

## 2017-04-15 LAB — BACTERIA SPEC AEROBE CULT: NORMAL

## 2017-04-18 ENCOUNTER — TELEPHONE (OUTPATIENT)
Dept: PSYCHIATRY | Facility: CLINIC | Age: 27
End: 2017-04-18

## 2017-04-18 ENCOUNTER — OFFICE VISIT (OUTPATIENT)
Dept: PSYCHIATRY | Facility: CLINIC | Age: 27
End: 2017-04-18

## 2017-04-18 VITALS
HEIGHT: 59 IN | WEIGHT: 191 LBS | SYSTOLIC BLOOD PRESSURE: 117 MMHG | DIASTOLIC BLOOD PRESSURE: 69 MMHG | BODY MASS INDEX: 38.51 KG/M2 | HEART RATE: 73 BPM

## 2017-04-18 DIAGNOSIS — F33.1 MAJOR DEPRESSIVE DISORDER, RECURRENT EPISODE, MODERATE (HCC): Primary | ICD-10-CM

## 2017-04-18 DIAGNOSIS — F60.9 PERSONALITY DISORDER, UNSPECIFIED (HCC): ICD-10-CM

## 2017-04-18 DIAGNOSIS — F41.9 ANXIETY DISORDER, UNSPECIFIED TYPE: ICD-10-CM

## 2017-04-18 PROCEDURE — 99213 OFFICE O/P EST LOW 20 MIN: CPT | Performed by: NURSE PRACTITIONER

## 2017-04-18 RX ORDER — BUSPIRONE HYDROCHLORIDE 10 MG/1
10 TABLET ORAL 2 TIMES DAILY
Qty: 60 TABLET | Refills: 0 | Status: SHIPPED | OUTPATIENT
Start: 2017-04-18 | End: 2017-05-16 | Stop reason: SDUPTHER

## 2017-04-18 RX ORDER — LORATADINE 10 MG
TABLET ORAL
COMMUNITY
Start: 2017-04-14 | End: 2022-02-09

## 2017-04-18 RX ORDER — FLUTICASONE PROPIONATE 44 MCG
AEROSOL WITH ADAPTER (GRAM) INHALATION
COMMUNITY
Start: 2017-04-14 | End: 2022-02-09

## 2017-04-18 RX ORDER — VILAZODONE HYDROCHLORIDE 10 MG/1
10 TABLET ORAL DAILY
Qty: 30 TABLET | Refills: 0 | Status: SHIPPED | OUTPATIENT
Start: 2017-04-18 | End: 2017-05-16 | Stop reason: SDUPTHER

## 2017-04-18 RX ORDER — DOXYCYCLINE HYCLATE 100 MG/1
CAPSULE ORAL
COMMUNITY
Start: 2017-04-13 | End: 2017-04-18 | Stop reason: SDUPTHER

## 2017-04-18 RX ORDER — NEOMYCIN SULFATE, POLYMYXIN B SULFATE, HYDROCORTISONE 3.5; 10000; 1 MG/ML; [USP'U]/ML; MG/ML
SOLUTION/ DROPS AURICULAR (OTIC)
COMMUNITY
Start: 2017-04-12 | End: 2017-05-16

## 2017-04-18 RX ORDER — ONDANSETRON 4 MG/1
TABLET, ORALLY DISINTEGRATING ORAL
COMMUNITY
Start: 2017-04-14 | End: 2017-05-16
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
## 2017-05-08 ENCOUNTER — OFFICE VISIT (OUTPATIENT)
Dept: PSYCHIATRY | Facility: CLINIC | Age: 27
End: 2017-05-08

## 2017-05-08 DIAGNOSIS — F60.9 PERSONALITY DISORDER, UNSPECIFIED (HCC): ICD-10-CM

## 2017-05-08 DIAGNOSIS — F33.1 MAJOR DEPRESSIVE DISORDER, RECURRENT EPISODE, MODERATE (HCC): Primary | ICD-10-CM

## 2017-05-08 DIAGNOSIS — F41.9 ANXIETY DISORDER, UNSPECIFIED TYPE: ICD-10-CM

## 2017-05-08 PROCEDURE — 90834 PSYTX W PT 45 MINUTES: CPT | Performed by: COUNSELOR

## 2017-05-11 ENCOUNTER — TRANSCRIBE ORDERS (OUTPATIENT)
Dept: MAMMOGRAPHY | Facility: CLINIC | Age: 27
End: 2017-05-11

## 2017-05-11 DIAGNOSIS — Z12.31 ENCOUNTER FOR MAMMOGRAM TO ESTABLISH BASELINE MAMMOGRAM: Primary | ICD-10-CM

## 2017-05-16 ENCOUNTER — OFFICE VISIT (OUTPATIENT)
Dept: PSYCHIATRY | Facility: CLINIC | Age: 27
End: 2017-05-16

## 2017-05-16 VITALS
WEIGHT: 196 LBS | HEIGHT: 59 IN | BODY MASS INDEX: 39.51 KG/M2 | SYSTOLIC BLOOD PRESSURE: 121 MMHG | DIASTOLIC BLOOD PRESSURE: 78 MMHG | HEART RATE: 89 BPM

## 2017-05-16 DIAGNOSIS — F41.9 ANXIETY DISORDER, UNSPECIFIED TYPE: ICD-10-CM

## 2017-05-16 DIAGNOSIS — F33.1 MAJOR DEPRESSIVE DISORDER, RECURRENT EPISODE, MODERATE (HCC): Primary | ICD-10-CM

## 2017-05-16 PROCEDURE — 99213 OFFICE O/P EST LOW 20 MIN: CPT | Performed by: NURSE PRACTITIONER

## 2017-05-16 RX ORDER — VILAZODONE HYDROCHLORIDE 20 MG/1
20 TABLET ORAL DAILY
Qty: 30 TABLET | Refills: 1 | Status: SHIPPED | OUTPATIENT
Start: 2017-05-16 | End: 2017-06-27 | Stop reason: SDUPTHER

## 2017-05-16 RX ORDER — BUSPIRONE HYDROCHLORIDE 10 MG/1
10 TABLET ORAL 2 TIMES DAILY
Qty: 60 TABLET | Refills: 1 | Status: SHIPPED | OUTPATIENT
Start: 2017-05-16 | End: 2017-06-27 | Stop reason: SDUPTHER

## 2017-06-05 ENCOUNTER — OFFICE VISIT (OUTPATIENT)
Dept: PSYCHIATRY | Facility: CLINIC | Age: 27
End: 2017-06-05

## 2017-06-05 DIAGNOSIS — F33.41 RECURRENT MAJOR DEPRESSION IN PARTIAL REMISSION (HCC): ICD-10-CM

## 2017-06-05 DIAGNOSIS — F41.9 ANXIETY DISORDER, UNSPECIFIED TYPE: Primary | ICD-10-CM

## 2017-06-05 DIAGNOSIS — F60.9 PERSONALITY DISORDER, UNSPECIFIED (HCC): ICD-10-CM

## 2017-06-05 PROCEDURE — 90834 PSYTX W PT 45 MINUTES: CPT | Performed by: COUNSELOR

## 2017-06-05 NOTE — PROGRESS NOTES
"    PROGRESS NOTE  Data:  Enriqueta Gotti came in 6/5/2017 for her regularly scheduled therapy session starting at 10:00 AM and ending at 10:50 AM, with Ebonie Ramos, [facility], [facility] .      (Scales based on 0 - 10 with 10 being the worst)  Depression: 5 Anxiety: 5     She verbalized concern about her parents' health issues.  Her kidney function is declining and her legs have started swelling.  Enriqueta plans to receive training for peritoneal dialysis in case her mother decides to do that instead of going to a clinic 3 days a week.  Her Papa has been having seizures or TIAs that cause paralysis and memory problems but he refuses to go to the hospital.  She said that she doesn't know what she will do when she loses them.  She can talk to her ex- Carlos about her feelings for a little while but then he gets tired of listening.  She has been getting closer to her friends Ashleigh and Lorenza who are emotionally supportive.  She has been frustrated with her brother and sister-in-law for leaving dirty dishes in the sink and not taking the garbage out so she created a \"rules list\" and their behavior has improved.  She became frustrated with her 7-year-old son, Philip yesterday when he wouldn't listen at the [employer].  She thinks that his father should've had him tested sooner for ADHD.    Assessment     She is still experiencing insomnia and only sleeps 2-3 hours at night but states that she does not feel tired.  She reports decreased irritability but states, \"stupid people aggravate me\".    Diagnosis:   Encounter Diagnoses   Name Primary?   • Anxiety disorder, unspecified type Yes   • Personality disorder, unspecified    • Recurrent major depression in partial remission        Anxiety disorder, unspecified type [F41.9]    Mental Status Exam  Hygiene:  good  Dress:  casual  Attitude:  Cooperative  Motor Activity:  Appropriate  Speech:  Pressured  Mood:  anxious  Affect:  calm and pleasant  Thought Processes:  " Circum  Thought Content:  normal  Suicidal Thoughts:  denies  Homicidal Thoughts:  denies  Crisis Safety Plan: yes, to come to the emergency room.  Hallucinations:  denies    Clinical Maneuvering/Intervention:  Therapist processed above session content with patient, her feelings were validated and education was provided about coping skills.  She was encouraged to focus on her own behavior and praise for walking away instead of acting out when angry.    Patient's Support Network Includes:  significant other, children and parents    Plan      Continue medication compliance.         Return in about 1 month (around 07/05/2017).

## 2017-06-27 ENCOUNTER — OFFICE VISIT (OUTPATIENT)
Dept: PSYCHIATRY | Facility: CLINIC | Age: 27
End: 2017-06-27

## 2017-06-27 VITALS
HEIGHT: 59 IN | HEART RATE: 87 BPM | BODY MASS INDEX: 39.72 KG/M2 | WEIGHT: 197 LBS | DIASTOLIC BLOOD PRESSURE: 69 MMHG | SYSTOLIC BLOOD PRESSURE: 105 MMHG

## 2017-06-27 DIAGNOSIS — F33.1 MAJOR DEPRESSIVE DISORDER, RECURRENT EPISODE, MODERATE (HCC): Primary | ICD-10-CM

## 2017-06-27 DIAGNOSIS — F60.9 PERSONALITY DISORDER, UNSPECIFIED (HCC): ICD-10-CM

## 2017-06-27 DIAGNOSIS — F41.1 GENERALIZED ANXIETY DISORDER: ICD-10-CM

## 2017-06-27 PROCEDURE — 99213 OFFICE O/P EST LOW 20 MIN: CPT | Performed by: NURSE PRACTITIONER

## 2017-06-27 RX ORDER — BUSPIRONE HYDROCHLORIDE 10 MG/1
10 TABLET ORAL 2 TIMES DAILY
Qty: 60 TABLET | Refills: 1 | Status: SHIPPED | OUTPATIENT
Start: 2017-06-27 | End: 2017-08-29 | Stop reason: SDUPTHER

## 2017-06-27 RX ORDER — VILAZODONE HYDROCHLORIDE 20 MG/1
20 TABLET ORAL DAILY
Qty: 30 TABLET | Refills: 1 | Status: SHIPPED | OUTPATIENT
Start: 2017-06-27 | End: 2017-08-29 | Stop reason: SDUPTHER

## 2017-06-27 NOTE — PROGRESS NOTES
"  Subjective   Enriqueta Gotti is a 26 y.o. female is here today for medication management follow-up at the [facility], she presents to her appointment on time.     Chief Complaint: Follow-up for depression  States she is doing good.  Been a little stressful with her mom beginning dialysis but says she is \"managing\".  Rates depression 5/10 today and anxiety a 6-7/10 scale.  Began C pap therapy last night and slept much better.  Been socializing some with friends and enjoying it.  No a/v hallucinations.  No SI or HI.    History of Present Illness    The following portions of the patient's history were reviewed and updated as appropriate: allergies, current medications, past family history, past medical history, past social history, past surgical history and problem list.    Review of Systems   Constitutional: Negative for appetite change, chills, diaphoresis, fatigue, fever and unexpected weight change.   HENT: Negative for hearing loss, sore throat, trouble swallowing and voice change.    Eyes: Negative for photophobia and visual disturbance.   Respiratory: Negative for cough, chest tightness and shortness of breath.    Cardiovascular: Negative for chest pain and palpitations.   Gastrointestinal: Negative for abdominal pain, constipation, nausea and vomiting.   Endocrine: Negative for cold intolerance and heat intolerance.   Genitourinary: Negative for dysuria and frequency.   Musculoskeletal: Negative for arthralgias, back pain, joint swelling and neck stiffness.   Skin: Negative for color change and wound.   Allergic/Immunologic: Negative for environmental allergies and immunocompromised state.   Neurological: Negative for dizziness, tremors, seizures, syncope, weakness, light-headedness and headaches.   Hematological: Negative for adenopathy. Does not bruise/bleed easily.       Objective   Physical Exam   Constitutional: She appears well-developed and well-nourished. No distress.   Neurological: She is " "alert. Coordination and gait normal.   Vitals reviewed.    Blood pressure 105/69, pulse 87, height 59\" (149.9 cm), weight 197 lb (89.4 kg).    Medication List:   Current Outpatient Prescriptions   Medication Sig Dispense Refill   • albuterol (PROVENTIL HFA;VENTOLIN HFA) 108 (90 BASE) MCG/ACT inhaler Inhale 2 puffs Every 6 (Six) Hours As Needed for Wheezing. 3.7 g 0   • busPIRone (BUSPAR) 10 MG tablet Take 1 tablet by mouth 2 (Two) Times a Day. 60 tablet 1   • cetirizine (zyrTEC) 10 MG tablet take 1 tablet by mouth once daily  0   • FLOVENT HFA 44 MCG/ACT inhaler      • MUCUS RELIEF 400 MG tablet      • Omega-3 Fatty Acids (FISH OIL) 1000 MG capsule capsule      • vilazodone (VIIBRYD) 20 MG tablet tablet Take 1 tablet by mouth Daily. 30 tablet 1   • vitamin D (ERGOCALCIFEROL) 56497 UNITS capsule capsule        No current facility-administered medications for this visit.        Mental Status Exam:   Hygiene:   fair  Cooperation:  Cooperative  Eye Contact:  Fair  Psychomotor Behavior:  Appropriate  Affect:  Appropriate  Hopelessness: Denies  Speech:  Normal  Thought Process:  Linear  Thought Content:  Normal  Suicidal:  None  Homicidal:  None  Hallucinations:  None  Delusion:  None  Memory:  Intact  Orientation:  Person, Place, Time and Situation  Reliability:  fair  Insight:  Fair  Judgement:  Fair  Impulse Control:  Fair  Physical/Medical Issues:  No     Assessment/Plan   Diagnoses and all orders for this visit:    Major depressive disorder, recurrent episode, moderate    Generalized anxiety disorder    Personality disorder, unspecified      Discussed medication options.  Continue and increase viibryd for depression and anxiety and continue buspar.  Continue therapy with Ebonie monthly.    Reviewed the risks, benefits, and side effects of the medications; patient acknowledged and verbally consented.  Patient is agreeable to call the [facility].  Patient is aware to call 911 or go to the nearest ER should begin " having SI/HI.     Return in 6 weeks

## 2017-07-03 ENCOUNTER — OFFICE VISIT (OUTPATIENT)
Dept: PSYCHIATRY | Facility: CLINIC | Age: 27
End: 2017-07-03

## 2017-07-03 DIAGNOSIS — F41.1 GENERALIZED ANXIETY DISORDER: ICD-10-CM

## 2017-07-03 DIAGNOSIS — F33.1 MAJOR DEPRESSIVE DISORDER, RECURRENT EPISODE, MODERATE (HCC): Primary | ICD-10-CM

## 2017-07-03 DIAGNOSIS — F60.9 PERSONALITY DISORDER, UNSPECIFIED (HCC): ICD-10-CM

## 2017-07-03 PROCEDURE — 90837 PSYTX W PT 60 MINUTES: CPT | Performed by: COUNSELOR

## 2017-07-03 NOTE — PROGRESS NOTES
"    PROGRESS NOTE  Data:  Enriqueta Gotti came in 7/3/2017 for her regularly scheduled therapy session starting at 10:15 AM and ending at 11:10 AM, with Ebonie Ramos, [facility], [facility]note 2[facility] .      (Scales based on 0 - 10 with 10 being the worst)  Depression: 7 Anxiety: 7     She said that her mother (Grandmother) got the fistula put in her arm in preparation for dialysis.  She became tearful when talking about her health issues and said, \"what will I do without her?\"  She said that her Papa (Grandfather) has been driving her crazy because every time she gets busy doing something else he needs something.  She wants to take care of both of them but gets tired and is frustrated that her brother and sister-in-law won't help out.  She also is frustrated with her biological father, Jose who won't help out either.  She is also having difficulty with her 7-year-old son, Philip who has ADHD.  She thinks she would do better if his father would allow him to take his medications on the weekends.  She and her ex-, Carlos spent a lot of time together but have difficulty getting privacy for sex.  This causes frustration and frequent arguments.  She doesn't get along with Carlos's stepmother, Nancy.  She suspects that she wants to raise Enriqueta and Carlos's son, Tony.  She went to see a doctor about fibromyalgia and was frustrated when he said there was no treatment other than exercise.  Her Papa wants her to apply for SSI benefits but she thinks that she is too young.    Assessment     She reports fewer verbal outbursts when angry but still loses control sometimes.  She tries to walk away and calm down but sometimes can't.  She reports problems with interrupted sleep.    Diagnosis:   Encounter Diagnoses   Name Primary?   • Major depressive disorder, recurrent episode, moderate Yes   • Generalized anxiety disorder    • Personality disorder, unspecified        Major depressive disorder, recurrent episode, moderate " [F33.1]    Mental Status Exam  Hygiene:  good  Dress:  casual  Attitude:  Cooperative  Motor Activity:  Restless  Speech:  Rambling  Mood:  within normal limits  Affect:  labile  Thought Processes:  Circum  Thought Content:  normal  Suicidal Thoughts:  denies  Homicidal Thoughts:  denies  Crisis Safety Plan: yes, to come to the emergency room.  Hallucinations:  denies    Clinical Maneuvering/Intervention:  Therapist processed above session content with patient, her feelings were validated and education was provided about coping skills.  She was encouraged to find a fibromyalgia support group online and to increase her exercise level as recommended by the physician.  She was praised for taking Fridays and part of the weekend off from caregiving to prevent burnout.  Cognitive behavioral techniques were used to reframe distorted thoughts that contribute to anxious and depressive symptoms.      Patient's Support Network Includes:  significant other and parents    Plan      Continue medication compliance.         Return in about 4 weeks (around 07/31/2017).

## 2017-07-31 ENCOUNTER — OFFICE VISIT (OUTPATIENT)
Dept: PSYCHIATRY | Facility: CLINIC | Age: 27
End: 2017-07-31

## 2017-07-31 DIAGNOSIS — F41.1 GENERALIZED ANXIETY DISORDER: Primary | ICD-10-CM

## 2017-07-31 DIAGNOSIS — F60.9 PERSONALITY DISORDER, UNSPECIFIED (HCC): ICD-10-CM

## 2017-07-31 PROCEDURE — 90834 PSYTX W PT 45 MINUTES: CPT | Performed by: COUNSELOR

## 2017-07-31 NOTE — PROGRESS NOTES
"    PROGRESS NOTE  Data:  Enriqueta Gotti came in 7/31/2017 for her regularly scheduled therapy session starting at 10:10 AM and ending at 10:50 AM, with Ebonie Ramos, [facility], [facility] [place] .      (Scales based on 0 - 10 with 10 being the worst)  Depression: 4 Anxiety: 4     She remains preoccupied with the behavior of other family members.  Her brother has been irritable because of having to serve weekends in [employer].  Her sister-in-law won't make her 3-year-old niece Pierron mind and she \"runs the house\".  She and her ex mother-in-law, Nancy got into a disagreement because she make negative comments about her parenting ability.  She has told Carlos that if he moves out she will call [employer] to get custody of Tony.  She is upset with Avinash's father, Akash for sending him to visit on the weekends without his ADHD medication.  She said that she has been participating in self care activities.  She goes out with Carlos and the children once a week and spends time with her friends on the weekends.    Assessment     She reports problems with intermittent insomnia.  On the weekend she is awake for 36-40 hours at a time.  She reports that she is becoming more assertive.    Diagnosis:   Encounter Diagnoses   Name Primary?   • Generalized anxiety disorder Yes   • Personality disorder, unspecified        Generalized anxiety disorder [F41.1]    Mental Status Exam  Hygiene:  good  Dress:  casual  Attitude:  Cooperative  Motor Activity:  Appropriate  Speech:  Normal  Mood:  within normal limits  Affect:  calm and pleasant  Thought Processes:  Circum  Thought Content:  Immature  Suicidal Thoughts:  denies  Homicidal Thoughts:  denies  Crisis Safety Plan: yes, to come to the emergency room.  Hallucinations:  denies    Clinical Maneuvering/Intervention:  Therapist processed above session content with patient, her feelings were validated and education was provided about coping skills.  Cognitive behavioral techniques were used to reframe " distorted thoughts that contribute to frustration.  She was reminded that the only person's behavior she can control is her own.  She was praised for participating in self care activities and for medication compliance.      Patient's Support Network Includes:  extended family    Plan   Continue medication compliance.         Return in about 1 month (around 08/31/2017).

## 2017-08-28 ENCOUNTER — OFFICE VISIT (OUTPATIENT)
Dept: PSYCHIATRY | Facility: CLINIC | Age: 27
End: 2017-08-28

## 2017-08-28 DIAGNOSIS — F41.1 GENERALIZED ANXIETY DISORDER: ICD-10-CM

## 2017-08-28 DIAGNOSIS — F60.9 PERSONALITY DISORDER, UNSPECIFIED (HCC): ICD-10-CM

## 2017-08-28 DIAGNOSIS — F33.41 RECURRENT MAJOR DEPRESSION IN PARTIAL REMISSION (HCC): Primary | ICD-10-CM

## 2017-08-28 PROCEDURE — 90832 PSYTX W PT 30 MINUTES: CPT | Performed by: COUNSELOR

## 2017-08-28 NOTE — PROGRESS NOTES
IDENTIFYING INFORMATION: The patient, Enriqueta Gotti, is a 26 y.o. female who is here today for a follow up appointment starting at 9:15 AM and ending at 9:45 AM.  Enriqueta presented for Individual Therapy    DATA: Patient presents for session on time, clean and casually dressed with no evidence of intoxication, withdrawal, or perceptual disturbance.  Patient was Open and Engaged.  Patient denies  ongoing, chronic suicidal ideation. Patient appears to continue to struggle with anxiety and depression.  The patient does present with a severe chronic mental illness. As a result,  she would be at significantly increased risk for decompensation and possibly higher level of care without continued treatment.    Chief Compliant   Depression Loss of Interest, Low energy and Change in appetite  Anxiety chest pain, irritable, shortness of breath, Nausea  8    Patient reports frustration with her brother and sister-in-law who have been living with her and her parents for the past 2 years.  They won't make their daughter, Tia mind.  She is tired of helping her sister-in-law with rides to the doctor and other errands.  She feels stuck because she has no income and she needs to stay there to take care of her elderly parents.  She continues to spend time with her ex-, Carlos.  She plans to take him to the hospital for gallbladder surgery today.  She still doesn't get along with his stepmother, Nancy.    ASSESSMENT:  CURRENT MEDICATIONS:  Current Outpatient Prescriptions   Medication Sig Dispense Refill   • albuterol (PROVENTIL HFA;VENTOLIN HFA) 108 (90 BASE) MCG/ACT inhaler Inhale 2 puffs Every 6 (Six) Hours As Needed for Wheezing. 3.7 g 0   • busPIRone (BUSPAR) 10 MG tablet Take 1 tablet by mouth 2 (Two) Times a Day. 60 tablet 1   • cetirizine (zyrTEC) 10 MG tablet take 1 tablet by mouth once daily  0   • FLOVENT HFA 44 MCG/ACT inhaler      • MUCUS RELIEF 400 MG tablet      • Omega-3 Fatty Acids (FISH OIL) 1000 MG  capsule capsule      • vilazodone (VIIBRYD) 20 MG tablet tablet Take 1 tablet by mouth Daily. 30 tablet 1   • vitamin D (ERGOCALCIFEROL) 84179 UNITS capsule capsule        No current facility-administered medications for this visit.        CURRENT SUBSTANCE USE: No concerns of substance abuse are reported.    NICOTINE USE:  Yes - daily cigarette use, smoking cessation discussed    FUNCTIONING ASSESSMENT:  Community Living Skills: Not Impaired  Interpersonal Skills: Moderately Impaired  Health and Physical Functioning:Not Impaired  Psychological State: Not Impaired  Readiness to Change: No    MENTAL STATUS EXAM:   Hygiene:   good  Cooperation:  Cooperative  Eye Contact:  Good  Psychomotor Behavior:  Appropriate  Affect:  Restricted  Hopelessness: Denies  Speech:  Normal  Thought Process:  Circum  Thought Content:  Mood congurent  Suicidal:  None  Homicidal:  None  Hallucinations:  None  Delusion:  None  Memory:  Intact  Orientation:  Person, Place, Time and Situation  Reliability:  fair  Insight:  Fair  Judgement:  Fair  Impulse Control:  Fair  Physical/Medical Issues:  No     Overall: Anxious     VISIT DIAGNOSIS:     ICD-10-CM ICD-9-CM   1. Recurrent major depression in partial remission F33.41 296.35   2. Generalized anxiety disorder F41.1 300.02   3. Personality disorder, unspecified F60.9 301.9        PROGRESS TOWARD CURRENT  TREATMENT PLAN DATED 8/28/17 :  Initial Plan    SHORT-TERM GOALS: Patient will be compliant with clinic appointments.  Patient will be engaged in therapy, medication compliant with minimal side effects. Patient  will report decrease of symptoms and frequency.     LONG-TERM GOALS: Patient will have minimal symptoms of  with continued medication management. Patient will be compliant with treatment and appointments.      STRENGTHS: Literate and Articulate    WEAKNESSES: Poor insight, Unemployed, Poor coping skills and Personality issues    SUPPORT SYSTEM:Parents, ex-    CLINICAL  MANEUVERING/INTERVENTION/SUPPORTIVE PSYCHOTHERAPY: Therapist continued to promote the therapeutic alliance, address the patient’s issues, and strengthen self awareness, insights, and coping skills.  Applied Cognitive - CBT/REBT/Realityand positive coping skills. Pt. was encouraged to use positive coping skills of sticking to a daily schedule, taking medication as prescribed, getting daily exercise, eating healthy, and applying positive self talk. Allowed patient to freely discuss issues without interruption or judgment. Provided safe, confidential environment to facilitate the development of positive therapeutic relationship and encourage open, honest communication. Assisted patient in identifying increased risk factors which would indicate the need for higher level of care including thoughts to harm self or others, self-harming behavior, and/or binge drinking and encouraged patient to contact this office, call 911, or present to the nearest emergency room should any of these events occur. Discussed crisis intervention services and means to access.  Education was provided about communication skills.  She was encouraged to be more assertive with her brother and sister-in-law.    PROGNOSIS: fair    PLAN: Enriqueta will continue in MONTHLY ongoing outpatient treatment with primary therapist and pharmacotherapy as scheduled.     CRISIS MANAGEMENT: Enriqueta will contact staff or crisis line if symptoms exacerbate or if harm to self or others becomes a concern. Crisis resources include: Crisis Line [phone], 911, Local Law Enforcement, [facility], Emergency Room [phone].

## 2017-08-29 ENCOUNTER — OFFICE VISIT (OUTPATIENT)
Dept: PSYCHIATRY | Facility: CLINIC | Age: 27
End: 2017-08-29

## 2017-08-29 VITALS
HEART RATE: 85 BPM | DIASTOLIC BLOOD PRESSURE: 75 MMHG | HEIGHT: 59 IN | SYSTOLIC BLOOD PRESSURE: 109 MMHG | BODY MASS INDEX: 40.16 KG/M2 | WEIGHT: 199.2 LBS

## 2017-08-29 DIAGNOSIS — F60.9 PERSONALITY DISORDER, UNSPECIFIED (HCC): ICD-10-CM

## 2017-08-29 DIAGNOSIS — F41.1 GENERALIZED ANXIETY DISORDER: ICD-10-CM

## 2017-08-29 DIAGNOSIS — F33.1 MAJOR DEPRESSIVE DISORDER, RECURRENT EPISODE, MODERATE (HCC): Primary | ICD-10-CM

## 2017-08-29 PROCEDURE — 99213 OFFICE O/P EST LOW 20 MIN: CPT | Performed by: NURSE PRACTITIONER

## 2017-08-29 RX ORDER — BUSPIRONE HYDROCHLORIDE 10 MG/1
10 TABLET ORAL 2 TIMES DAILY
Qty: 60 TABLET | Refills: 1 | Status: SHIPPED | OUTPATIENT
Start: 2017-08-29 | End: 2018-08-29

## 2017-08-29 RX ORDER — VILAZODONE HYDROCHLORIDE 20 MG/1
20 TABLET ORAL DAILY
Qty: 30 TABLET | Refills: 1 | Status: SHIPPED | OUTPATIENT
Start: 2017-08-29 | End: 2022-02-09

## 2017-08-29 RX ORDER — DOXEPIN HYDROCHLORIDE 10 MG/1
10 CAPSULE ORAL NIGHTLY
Qty: 30 CAPSULE | Refills: 1 | Status: SHIPPED | OUTPATIENT
Start: 2017-08-29 | End: 2022-02-09

## 2017-08-29 NOTE — PROGRESS NOTES
Subjective   Enriqueta Gotti is a 26 y.o. female is here today for medication management follow-up at the [facility], she presents to her appointment on time.     Chief Complaint: Follow-up for depression    History of Present Illness  She states that she feels tired today because she has been having problems with sleep.  She states there are days that she can go over 24 hours without sleeping, she can function even though she can't sleep- she just feels tired.  She states that she just recently changed insurance and is having difficulty paying for her RX.  She is considering applying for disability to assist with financial stress, she is not able to able to work because of her anxiety.  She states that she rates her depression and anxiety 8/10 with 10 being the worse, she states that things at home are very stressful- she continues to take care of her ill parents; shares that her brother and his family are not helping patient. She share that she is having problems falling and staying asleep, she has racing thoughts about everything that was done and has to be done- she is averaging between 2-4 hours per night.  She states that she has been eating ok with only slight weight gain.  She has generalized pain from fibromyalgia which she has not be able to get treated because of the effects of the medications.  She denies any problems or side effects from the medications; she continues to take it but states that the buspar causes her to be sleepy in the afternoon- recommended that she split the afternoon dose.  Dicussed started doxepin to assist with sleep.  Denies any AV hallucinations, denies any SI/HI.        The following portions of the patient's history were reviewed and updated as appropriate: allergies, current medications, past family history, past medical history, past social history, past surgical history and problem list.    Review of Systems   Constitutional: Negative for appetite change,  "chills, diaphoresis, fatigue, fever and unexpected weight change.   HENT: Negative for hearing loss, sore throat, trouble swallowing and voice change.    Eyes: Negative for photophobia and visual disturbance.   Respiratory: Negative for cough, chest tightness and shortness of breath.    Cardiovascular: Negative for chest pain and palpitations.   Gastrointestinal: Negative for abdominal pain, constipation, nausea and vomiting.   Endocrine: Negative for cold intolerance and heat intolerance.   Genitourinary: Negative for dysuria and frequency.   Musculoskeletal: Negative for arthralgias, back pain, joint swelling and neck stiffness.        Generalized body and joint pain   Skin: Negative for color change and wound.   Allergic/Immunologic: Negative for environmental allergies and immunocompromised state.   Neurological: Negative for dizziness, tremors, seizures, syncope, weakness, light-headedness and headaches.   Hematological: Negative for adenopathy. Does not bruise/bleed easily.       Objective   Physical Exam   Constitutional: She appears well-developed and well-nourished. No distress.   Neurological: She is alert. Coordination and gait normal.   Vitals reviewed.    Blood pressure 109/75, pulse 85, height 59\" (149.9 cm), weight 199 lb 3.2 oz (90.4 kg).    Medication List:   Current Outpatient Prescriptions   Medication Sig Dispense Refill   • albuterol (PROVENTIL HFA;VENTOLIN HFA) 108 (90 BASE) MCG/ACT inhaler Inhale 2 puffs Every 6 (Six) Hours As Needed for Wheezing. 3.7 g 0   • busPIRone (BUSPAR) 10 MG tablet Take 1 tablet by mouth 2 (Two) Times a Day. 60 tablet 1   • cetirizine (zyrTEC) 10 MG tablet take 1 tablet by mouth once daily  0   • doxepin (SINEquan) 10 MG capsule Take 1 capsule by mouth Every Night. 30 capsule 1   • FLOVENT HFA 44 MCG/ACT inhaler      • MUCUS RELIEF 400 MG tablet      • Omega-3 Fatty Acids (FISH OIL) 1000 MG capsule capsule      • vilazodone (VIIBRYD) 20 MG tablet tablet Take 1 tablet " by mouth Daily. 30 tablet 1   • vitamin D (ERGOCALCIFEROL) 96336 UNITS capsule capsule        No current facility-administered medications for this visit.        Mental Status Exam:   Hygiene:   fair  Cooperation:  Cooperative  Eye Contact:  Fair  Psychomotor Behavior:  Appropriate  Affect:  Appropriate  Hopelessness: Denies  Speech:  Normal  Thought Process:  Linear  Thought Content:  Normal  Suicidal:  None  Homicidal:  None  Hallucinations:  None  Delusion:  None  Memory:  Intact  Orientation:  Person, Place, Time and Situation  Reliability:  fair  Insight:  Fair  Judgement:  Fair  Impulse Control:  Fair  Physical/Medical Issues:  No     Assessment/Plan   Diagnoses and all orders for this visit:    Major depressive disorder, recurrent episode, moderate    Generalized anxiety disorder    Personality disorder, unspecified    Other orders  -     vilazodone (VIIBRYD) 20 MG tablet tablet; Take 1 tablet by mouth Daily.  -     busPIRone (BUSPAR) 10 MG tablet; Take 1 tablet by mouth 2 (Two) Times a Day.  -     doxepin (SINEquan) 10 MG capsule; Take 1 capsule by mouth Every Night.      Discussed medication options.  Continue viibryd for depression and anxiety and continue buspar for anxiety, begin doxepin for sleep.  Continue therapy with Ebonie monthly.    Prognosis guarded depending on compliant with treatment. Reviewed the risks, benefits, and side effects of the medications; patient acknowledged and verbally consented.  Patient is agreeable to call the [facility].  Patient is aware to call 911 or go to the nearest ER should begin having SI/HI.     Return in 8 weeks

## 2017-08-30 ENCOUNTER — DOCUMENTATION (OUTPATIENT)
Dept: PSYCHIATRY | Facility: CLINIC | Age: 27
End: 2017-08-30

## 2017-08-30 NOTE — TREATMENT PLAN
Multi-Disciplinary Problems (from Behavioral Health Treatment Plan)    Active Problems     Problem: Anxiety (Priority: --)  (Start Date: 08/30/17) (Resolve Date: --)    Problem Details:  The patient self-scales this problem as a 10 with 10 being the worst.         Goal Start Date End Date    Patient will develop and implement behavioral and cognitive strategies to reduce anxiety and irrational fears. 08/30/17 --    Goal Details:  Progress toward goal:  Not appropriate to rate progress toward goal since this is the initial treatment plan.         Goal Intervention Frequency Start Date End Date    Help patient explore past emotional issues in relation to present anxiety. Q Month 08/30/17 08/30/18    Intervention Details:  Duration of treatment until until remission of symptoms.         Goal Intervention Frequency Start Date End Date    Help patient develop an awareness of their cognitive and physical responses to anxiety. Q Month 08/30/17 08/30/18    Intervention Details:  Duration of treatment until until discharged.               Problem: Depression (Priority: --)  (Start Date: 08/30/17) (Resolve Date: --)    Problem Details:  The patient self-scales this problem as a 8 with 10 being the worst.         Goal Start Date End Date    Patient will demonstrate the ability to initiate new constructive life skills outside of sessions on a consistent basis. 08/30/17 --    Goal Details:  Progress toward goal:  The patient self-scales their progress related to this goal as a 6 with 10 being the worst.         Goal Intervention Frequency Start Date End Date    Assist patient in setting attainable activities of daily living goals. PRN 08/30/17 --    Goal Intervention Frequency Start Date End Date    Provide education about depression PRN 08/30/17 08/30/18    Intervention Details:  Duration of treatment until until discharged.         Goal Intervention Frequency Start Date End Date    Assist patient in developing healthy coping  strategies. Q Month 08/30/17 08/30/18    Intervention Details:  Duration of treatment until until discharged.               Problem: Ineffective Coping (Priority: --)  (Start Date: 08/30/17) (Resolve Date: --)    Problem Details:  The patient self-scales this problem as a 10 with 10 being the worst.         Goal Start Date End Date    Patient will demonstrate the ability to initiate new constructive life skills consistently. 08/30/17 --    Goal Details:  Progress toward goal:  Not appropriate to rate progress toward goal since this is the initial treatment plan.           Goal Intervention Frequency Start Date End Date    Assist patient in identifying healthy coping behaviors. Q Month 08/30/17 08/30/18    Intervention Details:  Duration of treatment until until discharged.                     Reviewed By     Ebonie Ramos [facility] 08/30/17 1103                 I have discussed and reviewed this treatment plan with the patient.  It has been printed for signatures.

## 2017-10-17 ENCOUNTER — OFFICE VISIT (OUTPATIENT)
Dept: ORTHOPEDIC SURGERY | Facility: CLINIC | Age: 27
End: 2017-10-17

## 2017-10-17 VITALS — BODY MASS INDEX: 40.52 KG/M2 | HEIGHT: 59 IN | RESPIRATION RATE: 18 BRPM | WEIGHT: 201 LBS

## 2017-10-17 DIAGNOSIS — M79.671 RIGHT FOOT PAIN: Primary | ICD-10-CM

## 2017-10-17 DIAGNOSIS — S93.491A SPRAIN OF OTHER LIGAMENT OF RIGHT ANKLE, INITIAL ENCOUNTER: Primary | ICD-10-CM

## 2017-10-17 PROCEDURE — 99213 OFFICE O/P EST LOW 20 MIN: CPT | Performed by: PODIATRIST

## 2017-10-17 PROCEDURE — 29580 STRAPPING UNNA BOOT: CPT | Performed by: PODIATRIST

## 2017-10-17 NOTE — PROGRESS NOTES
Subjective   Patient ID: Enriqueta Gotti is a 26 y.o. female   Pain of the Right Ankle  She comes in today after suffering an injury to the right ankle over the weekend.  Saturday she was involved in a wore reenactment in [facility] and she states she fell.  She was in a dirt and gravel area but she says it does have Rutz and uneven ground.  She says she's not really sure of the mechanism of injury however she was seen in the emergency department and given crutches.  X-rays were taken and she sought follow-up here.  I have seen her in the past in regards to similar injury on the left side back at the beginning of this year.    History of Present Illness    Pain Score: 7  Pain Location: Ankle  Pain Orientation: Right     Pain Descriptors: Aching, Radiating                       Pain Intervention(s): Rest, Medication (See MAR)          Review of Systems   Constitutional: Negative for diaphoresis, fever and unexpected weight change.   HENT: Negative for dental problem and sore throat.    Eyes: Negative for visual disturbance.   Respiratory: Negative for shortness of breath.    Cardiovascular: Negative for chest pain.   Gastrointestinal: Negative for abdominal pain, constipation, diarrhea, nausea and vomiting.   Genitourinary: Negative for difficulty urinating and frequency.   Neurological: Negative for headaches.   Hematological: Does not bruise/bleed easily.   All other systems reviewed and are negative.      Past Medical History:   Diagnosis Date   • Ankle sprain     right   • Anxiety    • Bipolar disorder    • Carpal tunnel syndrome    • Chronic pain disorder     Fibroymyalgia   • Tear of meniscus of knee     left         Past Surgical History:   Procedure Laterality Date   • CARPAL TUNNEL RELEASE     •  SECTION     • CHOLECYSTECTOMY     • LAPAROSCOPIC TUBAL LIGATION     • TONSILLECTOMY AND ADENOIDECTOMY     • TUBAL ABDOMINAL LIGATION         Allergies   Allergen Reactions   • Codeine    •  Penicillins    • Sulfa Antibiotics        Family History   Problem Relation Age of Onset   • Osteoarthritis Other    • Osteoporosis Other    • Heart disease Other    • Hypertension Other    • Asthma Other    • Diabetes Other    • Kidney disease Other    • Stroke Other        Social History     Social History   • Marital status:      Spouse name: N/A   • Number of children: N/A   • Years of education: N/A     Occupational History   • Not on file.     Social History Main Topics   • Smoking status: Current Every Day Smoker     Packs/day: 0.50     Years: 1.00     Types: Cigarettes     Start date: 1/17/2016   • Smokeless tobacco: Never Used      Comment: Trying to quit.   • Alcohol use Yes      Comment: rarely   • Drug use: No   • Sexual activity: Defer     Other Topics Concern   • Not on file     Social History Narrative       Ready to quit: Not Answered  Counseling given: Not Answered       All the above social hx, family hx, surgical history,medications, allergies, ros & HPI reviewed.  Objective   Physical Exam   Constitutional: She is oriented to person, place, and time. She appears well-developed and well-nourished.   HENT:   Head: Normocephalic and atraumatic.   Eyes: EOM are normal. Pupils are equal, round, and reactive to light.   Neck: Normal range of motion.   Musculoskeletal: Normal range of motion.   Neurological: She is alert and oriented to person, place, and time. She has normal reflexes.   Skin: Skin is warm.   Psychiatric: She has a normal mood and affect. Her behavior is normal. Judgment and thought content normal.     Ortho Exam  Ortho Exam right  Lower extremity exam:  Vascular: Pulses are palpable, 2+ edema is noted to the right foot and ankle, pedal hair is noted, capillary refill time is brisk  Neuro: Gross sensation is intact  Derm: There is significant bruising and ecchymosis to the entire right foot and ankle.  Normal turgor and temperature.  MSK: The Achilles tendon feels intact.  There  is slight plantarflexion and dorsiflexion of the ankle but this is limited due to pain and swelling.  Same goes for the range of motion of the digits.  She's tender to palpation medially along the posterior medial compartment.  There is also a fairly edematous area here along the course of the posterior medial tendons.  She complains of pain at the area of the tibialis anterior and EHL junction across the ankle as well.  There is also tenderness to palpation and pain laterally along the lateral ankle gutter, ATFL, peroneals.    Assessment/Plan right ankle sprain  Independent Review of Radiographic Studies:      Laboratory and Other Studies:     Medical Decision Making:        Procedures an Unna boot was placed about the right lower extremity  [] No procedures were performed in office today.    Enriqueta was seen today for pain.    Diagnoses and all orders for this visit:    Sprain of other ligament of right ankle, initial encounter        Recommendations/Plan:  I again discussed a similar conversation with her today as we had earlier in the year about the left side.  She should keep the Unna boot on and can partial weight-bear with crutches and a boot for the right side it sits comfortable.  If this is too painful she does not have to bear weight.  They did inquire about a knee scooter and I explained we can get one of those but insurance does not pay for them.  I explained the rental process or advise them to check on line.  If they're unsuccessful the can let me know and we'll be happy to get her one.  She's been instructed on strict Rice.  Continue her 3 times a day ibuprofen.  Ice and elevate as much as possible.  I'll have her follow up in 7-10 days to reevaluate this.  I again discussed all the potential soft tissue injuries and explained possible need for MRI.    Return in about 10 days (around 10/27/2017).  Patient agreeable to call or return sooner for any concerns.

## 2017-10-26 ENCOUNTER — OFFICE VISIT (OUTPATIENT)
Dept: ORTHOPEDIC SURGERY | Facility: CLINIC | Age: 27
End: 2017-10-26

## 2017-10-26 VITALS — HEIGHT: 59 IN | WEIGHT: 201 LBS | RESPIRATION RATE: 16 BRPM | BODY MASS INDEX: 40.52 KG/M2

## 2017-10-26 DIAGNOSIS — M76.829 POSTERIOR TIBIAL TENDON DYSFUNCTION: ICD-10-CM

## 2017-10-26 DIAGNOSIS — S93.492A SPRAIN OF OTHER LIGAMENT OF LEFT ANKLE, INITIAL ENCOUNTER: Primary | ICD-10-CM

## 2017-10-26 DIAGNOSIS — S93.491A SPRAIN OF OTHER LIGAMENT OF RIGHT ANKLE, INITIAL ENCOUNTER: ICD-10-CM

## 2017-10-26 PROCEDURE — 99213 OFFICE O/P EST LOW 20 MIN: CPT | Performed by: PODIATRIST

## 2017-10-26 NOTE — PROGRESS NOTES
Subjective   Patient ID: Enriqueta Gotti is a 26 y.o. female   Pain and Follow-up of the Right Ankle    She comes in today for follow-up on the right ankle.  She still using a boot and crutches.  She says she can put a tiny bit of weight down on it with the boot but it still hurts fairly significantly.  She says it hurts worse on the inside and the outside.  She is questioning if she can go to any of her reenactments in 2 weeks.  History of Present Illness                                                   Review of Systems   Constitutional: Negative.    HENT: Negative.    Eyes: Negative.    Respiratory: Negative.    Cardiovascular: Negative.    Gastrointestinal: Negative.    Endocrine: Negative.    Genitourinary: Negative.    Musculoskeletal: Negative.    Skin: Negative.    Allergic/Immunologic: Negative.    Neurological: Negative.    Hematological: Negative.    Psychiatric/Behavioral: Negative.        Past Medical History:   Diagnosis Date   • Ankle sprain     right   • Anxiety    • Bipolar disorder    • Carpal tunnel syndrome    • Chronic pain disorder     Fibroymyalgia   • Tear of meniscus of knee     left         Past Surgical History:   Procedure Laterality Date   • CARPAL TUNNEL RELEASE     •  SECTION     • CHOLECYSTECTOMY     • LAPAROSCOPIC TUBAL LIGATION     • TONSILLECTOMY AND ADENOIDECTOMY     • TUBAL ABDOMINAL LIGATION         Allergies   Allergen Reactions   • Codeine    • Penicillins    • Sulfa Antibiotics        Family History   Problem Relation Age of Onset   • Osteoarthritis Other    • Osteoporosis Other    • Heart disease Other    • Hypertension Other    • Asthma Other    • Diabetes Other    • Kidney disease Other    • Stroke Other        Social History     Social History   • Marital status:      Spouse name: N/A   • Number of children: N/A   • Years of education: N/A     Occupational History   • Not on file.     Social History Main Topics   • Smoking status: Current Every Day  Smoker     Packs/day: 0.50     Years: 1.00     Types: Cigarettes     Start date: 1/17/2016   • Smokeless tobacco: Never Used      Comment: Trying to quit.   • Alcohol use Yes      Comment: rarely   • Drug use: No   • Sexual activity: Defer     Other Topics Concern   • Not on file     Social History Narrative       Ready to quit: Yes  Counseling given: Yes       All the above social hx, family hx, surgical history,medications, allergies, ros & HPI reviewed.  Objective   Physical Exam   Constitutional: She is oriented to person, place, and time. She appears well-developed and well-nourished.   HENT:   Head: Normocephalic and atraumatic.   Eyes: EOM are normal. Pupils are equal, round, and reactive to light.   Neck: Normal range of motion.   Cardiovascular: Normal rate.    Abdominal: Bowel sounds are normal.   Musculoskeletal: Normal range of motion.   Neurological: She is alert and oriented to person, place, and time. She has normal reflexes.   Skin: Skin is warm.   Psychiatric: She has a normal mood and affect. Her behavior is normal. Judgment and thought content normal.   Nursing note and vitals reviewed.    Ortho Exam  Ortho Exam  Lower extremity exam:  Vascular: Pulses are palpable, 2+ edema is noted to the right foot and ankle, pedal hair is noted, capillary refill time is brisk  Neuro: Gross sensation is intact  Derm: There is  bruising and ecchymosis to the posterior medial right foot and ankle on the course of the first or tibial tendon.  Normal turgor and temperature.  MSK: The Achilles tendon feels intact.   She's tender to palpation medially along the posterior medial compartment.  There is also a fairly edematous area here along the course of the posterior medial tendons.  There is also tenderness to palpation and pain laterally along the lateral ankle gutter, ATFL.       Assessment/Plan right lateral ankle sprain, possible posterior tibial tendon tear  Independent Review of Radiographic Studies:       Laboratory and Other Studies:     Medical Decision Making:        Procedures  [] No procedures were performed in office today.    Enriqueta was seen today for pain and follow-up.    Diagnoses and all orders for this visit:    Sprain of other ligament of left ankle, initial encounter  -     Ambulatory Referral to Physical Therapy Evaluate and treat  -     MRI Ankle Right Without Contrast    Sprain of other ligament of right ankle, initial encounter    Posterior tibial tendon dysfunction        Recommendations/Plan:  Given the amount of swelling and pain remaining in the posterior medial ankle I think it's warranted to send her for an MRI to evaluate for possible tendon tear or rupture.  I explained that I'm hopeful this is just tendinitis and can potentially be resolved with therapy but if there is a holli tear this would have to be fixed surgically.  I recommend she continue with the boot and crutches.  I will have her begin some physical therapy and see him much improvement if she gains from that.  Continue with anti-inflammatories on a regular basis, ice and compression.  I'll have her follow up after the MRI to determine further treatment course.    Return AFTER MRI.  Patient agreeable to call or return sooner for any concerns.

## 2017-11-09 ENCOUNTER — OFFICE VISIT (OUTPATIENT)
Dept: ORTHOPEDIC SURGERY | Facility: CLINIC | Age: 27
End: 2017-11-09

## 2017-11-09 VITALS — BODY MASS INDEX: 40.52 KG/M2 | RESPIRATION RATE: 16 BRPM | HEIGHT: 59 IN | WEIGHT: 201 LBS

## 2017-11-09 DIAGNOSIS — M79.671 RIGHT FOOT PAIN: ICD-10-CM

## 2017-11-09 DIAGNOSIS — S93.492A SPRAIN OF OTHER LIGAMENT OF LEFT ANKLE, INITIAL ENCOUNTER: Primary | ICD-10-CM

## 2017-11-09 DIAGNOSIS — M76.829 POSTERIOR TIBIAL TENDON DYSFUNCTION: ICD-10-CM

## 2017-11-09 DIAGNOSIS — S93.491A SPRAIN OF OTHER LIGAMENT OF RIGHT ANKLE, INITIAL ENCOUNTER: ICD-10-CM

## 2017-11-09 PROCEDURE — 99213 OFFICE O/P EST LOW 20 MIN: CPT | Performed by: PODIATRIST

## 2017-11-09 RX ORDER — PREDNISONE 20 MG/1
TABLET ORAL
COMMUNITY
Start: 2017-10-31 | End: 2022-02-09

## 2017-11-09 RX ORDER — DEXAMETHASONE SODIUM PHOSPHATE 4 MG/ML
4 INJECTION, SOLUTION INTRA-ARTICULAR; INTRALESIONAL; INTRAMUSCULAR; INTRAVENOUS; SOFT TISSUE TAKE AS DIRECTED
Qty: 30 ML | Refills: 0 | Status: SHIPPED | OUTPATIENT
Start: 2017-11-09 | End: 2022-02-09

## 2017-11-09 RX ORDER — HYDROCODONE BITARTRATE AND ACETAMINOPHEN 7.5; 325 MG/1; MG/1
TABLET ORAL
COMMUNITY
Start: 2017-10-31 | End: 2022-02-09

## 2017-11-09 RX ORDER — METHYLPREDNISOLONE 4 MG/1
TABLET ORAL
Qty: 21 TABLET | Refills: 0 | Status: SHIPPED | OUTPATIENT
Start: 2017-11-09 | End: 2022-02-09

## 2017-11-09 RX ORDER — LORAZEPAM 1 MG/1
TABLET ORAL
COMMUNITY
Start: 2017-10-31 | End: 2022-02-09

## 2017-11-09 NOTE — PROGRESS NOTES
Subjective   Patient ID: Enriqueta Gotti is a 26 y.o. female   Follow-up of the Right Ankle  She returns for follow-up for her right ankle pain/injury.  Her MRI was denied.  She is wearing the boot.  She says she's been for physical therapy for this to this point.  She says it is still very sore and swollen and painful.  She says it's very difficult for her to take care of HER 2 children with this.  She has been taking excessive amounts of ibuprofen.    History of Present Illness    Pain Score: 5  Pain Location: Ankle  Pain Orientation: Right     Pain Descriptors: Aching  Pain Frequency: Constant/continuous           Aggravating Factors: Walking, Standing        Pain Intervention(s): Rest, Home medication  Result of Injury: Yes (fell at an event)  Work-Related Injury: No    Review of Systems   Constitutional: Negative for diaphoresis, fever and unexpected weight change.   HENT: Negative for dental problem and sore throat.    Eyes: Negative for visual disturbance.   Respiratory: Negative for shortness of breath.    Cardiovascular: Negative for chest pain.   Gastrointestinal: Negative for abdominal pain, constipation, diarrhea, nausea and vomiting.   Genitourinary: Negative for difficulty urinating and frequency.   Musculoskeletal: Positive for arthralgias.   Neurological: Negative for headaches.   Hematological: Does not bruise/bleed easily.   All other systems reviewed and are negative.      Past Medical History:   Diagnosis Date   • Ankle sprain     right   • Anxiety    • Bipolar disorder    • Carpal tunnel syndrome    • Chronic pain disorder     Fibroymyalgia   • Tear of meniscus of knee     left         Past Surgical History:   Procedure Laterality Date   • CARPAL TUNNEL RELEASE     •  SECTION     • CHOLECYSTECTOMY     • LAPAROSCOPIC TUBAL LIGATION     • TONSILLECTOMY AND ADENOIDECTOMY     • TUBAL ABDOMINAL LIGATION         Allergies   Allergen Reactions   • Codeine    • Penicillins    • Sulfa  Antibiotics        Family History   Problem Relation Age of Onset   • Osteoarthritis Other    • Osteoporosis Other    • Heart disease Other    • Hypertension Other    • Asthma Other    • Diabetes Other    • Kidney disease Other    • Stroke Other        Social History     Social History   • Marital status:      Spouse name: N/A   • Number of children: N/A   • Years of education: N/A     Occupational History   • Not on file.     Social History Main Topics   • Smoking status: Current Every Day Smoker     Packs/day: 0.50     Years: 1.00     Types: Cigarettes     Start date: 1/17/2016   • Smokeless tobacco: Never Used      Comment: Trying to quit.   • Alcohol use Yes      Comment: rarely   • Drug use: No   • Sexual activity: Defer     Other Topics Concern   • Not on file     Social History Narrative       Ready to quit: Not Answered  Counseling given: Not Answered       I have reviewed all of the above social hx, family hx, surgical hx, medications, allergies & ROS and confirm that it is accurate.  Objective   Physical Exam   Constitutional: She is oriented to person, place, and time. She appears well-developed and well-nourished.   HENT:   Head: Normocephalic and atraumatic.   Eyes: EOM are normal. Pupils are equal, round, and reactive to light.   Neck: Normal range of motion.   Pulmonary/Chest: Effort normal.   Musculoskeletal: Normal range of motion.   Neurological: She is alert and oriented to person, place, and time. She has normal reflexes.   Skin: Skin is warm.   Psychiatric: She has a normal mood and affect. Her behavior is normal. Judgment and thought content normal.   Nursing note and vitals reviewed.    Ortho Exam  Ortho Exam  Lower extremity exam:  Vascular: Pulses are palpable, 2+ edema is noted to the right foot and ankle, pedal hair is noted, capillary refill time is brisk  Neuro: Gross sensation is intact  Derm: There is  bruising and ecchymosis to the posterior medial right foot and ankle on the  course of the first or tibial tendon.  Normal turgor and temperature.  MSK: The Achilles tendon feels intact.   She's tender to palpation medially along the posterior medial compartment.  There is also a fairly edematous area here along the course of the posterior medial tendons.  Manual muscle testing is 4/5 with pain along the course of the posterior tibial tendon.  There is also pain down into the plantar medial arch and navicular tuberosity.  Assessment/Plan right ankle sprain/injury, probable at least partial posterior tibial tendon tear  Independent Review of Radiographic Studies:      Laboratory and Other Studies:     Medical Decision Making:        Procedures  [] No procedures were performed in office today.    Enriqueta was seen today for follow-up.    Diagnoses and all orders for this visit:    Sprain of other ligament of left ankle, initial encounter  -     MRI Ankle Right Without Contrast    Sprain of other ligament of right ankle, initial encounter  -     MRI Ankle Right Without Contrast    Posterior tibial tendon dysfunction  -     MRI Ankle Right Without Contrast    Right foot pain  -     MRI Ankle Right Without Contrast    Other orders  -     MethylPREDNISolone (MEDROL, LENORE,) 4 MG tablet; Take as directed on package instructions.        Recommendations/Plan:  I will appeal and/or reorder her MRI to see if we can have this done.  I recommend she continue physical therapy.  She says that her therapist has asked about a prescription for dexamethasone for phono or iontophoresis so we will try to get this straightened out for her as well.  She says the boot very painful and cumbersome so I will give her the option of a lace up ankle brace and which ever she can ambulate in more comfortably is okay for the time being.  I'll see her back in about 2 or 3 weeks.  I will also give her a Medrol Dosepak to see if this helps with the swelling, inflammation, edema.  I explained if she does have a torn posterior  tibial tendon and this may need to be fixed surgically and unfortunately delaying the MRI is only delaying her treatment and recovery.    No Follow-up on file.  Patient agreeable to call or return sooner for any concerns.

## 2017-12-05 ENCOUNTER — OFFICE VISIT (OUTPATIENT)
Dept: ORTHOPEDIC SURGERY | Facility: CLINIC | Age: 27
End: 2017-12-05

## 2017-12-05 VITALS — HEIGHT: 59 IN | RESPIRATION RATE: 16 BRPM | BODY MASS INDEX: 40.12 KG/M2 | WEIGHT: 199 LBS

## 2017-12-05 DIAGNOSIS — S93.491A SPRAIN OF OTHER LIGAMENT OF RIGHT ANKLE, INITIAL ENCOUNTER: Primary | ICD-10-CM

## 2017-12-05 DIAGNOSIS — M76.829 POSTERIOR TIBIAL TENDON DYSFUNCTION: ICD-10-CM

## 2017-12-05 PROCEDURE — 99213 OFFICE O/P EST LOW 20 MIN: CPT | Performed by: PODIATRIST

## 2017-12-05 NOTE — PROGRESS NOTES
Subjective   Patient ID: Enriqueta Gotti is a 27 y.o. female   Follow-up of the Right Ankle  She returns today still complaining of both inside and outside of her ankle hurting.  Still complains of gross instability.  Has yet to have her MRI done or approved.  Wearing her brace and regular shoe.    History of Present Illness    Pain Score: 7  Pain Location: Ankle  Pain Orientation: Right     Pain Descriptors: Aching, Throbbing  Pain Frequency: Constant/continuous           Aggravating Factors: Walking, Standing        Pain Intervention(s): Rest  Result of Injury: Yes  Work-Related Injury: No    Review of Systems   Constitutional: Negative for diaphoresis, fever and unexpected weight change.   HENT: Negative for dental problem and sore throat.    Eyes: Negative for visual disturbance.   Respiratory: Negative for shortness of breath.    Cardiovascular: Negative for chest pain.   Gastrointestinal: Negative for abdominal pain, constipation, diarrhea, nausea and vomiting.   Genitourinary: Negative for difficulty urinating and frequency.   Musculoskeletal: Positive for arthralgias.   Neurological: Negative for headaches.   Hematological: Does not bruise/bleed easily.   All other systems reviewed and are negative.      Past Medical History:   Diagnosis Date   • Ankle sprain     right   • Anxiety    • Bipolar disorder    • Carpal tunnel syndrome    • Chronic pain disorder     Fibroymyalgia   • Tear of meniscus of knee     left         Past Surgical History:   Procedure Laterality Date   • CARPAL TUNNEL RELEASE     •  SECTION     • CHOLECYSTECTOMY     • LAPAROSCOPIC TUBAL LIGATION     • TONSILLECTOMY AND ADENOIDECTOMY     • TUBAL ABDOMINAL LIGATION         Allergies   Allergen Reactions   • Codeine    • Penicillins    • Sulfa Antibiotics        Family History   Problem Relation Age of Onset   • Osteoarthritis Other    • Osteoporosis Other    • Heart disease Other    • Hypertension Other    • Asthma Other    •  Diabetes Other    • Kidney disease Other    • Stroke Other        Social History     Social History   • Marital status:      Spouse name: N/A   • Number of children: N/A   • Years of education: N/A     Occupational History   • Not on file.     Social History Main Topics   • Smoking status: Current Every Day Smoker     Packs/day: 0.50     Years: 1.00     Types: Cigarettes     Start date: 1/17/2016   • Smokeless tobacco: Never Used      Comment: Trying to quit.   • Alcohol use Yes      Comment: rarely   • Drug use: No   • Sexual activity: Defer     Other Topics Concern   • Not on file     Social History Narrative       Ready to quit: Not Answered  Counseling given: Not Answered       I have reviewed all of the above social hx, family hx, surgical hx, medications, allergies & ROS and confirm that it is accurate.  Objective   Physical Exam   Constitutional: She is oriented to person, place, and time. She appears well-developed and well-nourished.   HENT:   Head: Normocephalic and atraumatic.   Eyes: EOM are normal. Pupils are equal, round, and reactive to light.   Neck: Normal range of motion.   Pulmonary/Chest: Effort normal.   Abdominal: Soft.   Musculoskeletal: Normal range of motion.   Neurological: She is alert and oriented to person, place, and time. She has normal reflexes.   Skin: Skin is warm.   Psychiatric: She has a normal mood and affect. Her behavior is normal. Judgment and thought content normal.     Ortho Exam  Ortho Exam  Lower extremity exam:  Vascular: Pulses are palpable, 2+ edema is noted to the right foot and ankle, pedal hair is noted, capillary refill time is brisk  Neuro: Gross sensation is intact  Derm: There is  bruising and ecchymosis to the posterior medial right foot and ankle on the course of the first or tibial tendon.  Normal turgor and temperature.  MSK: The Achilles tendon feels intact.   She's tender to palpation medially along the posterior medial compartment.  There is also a  fairly edematous area here along the course of the posterior medial tendons.  Manual muscle testing is 4/5 with pain along the course of the posterior tibial tendon.  There is also pain down into the plantar medial arch and navicular tuberosity    Assessment/Plan right ankle instability, lateral ligament injury/tear, possible posterior tibial tendon tear  Independent Review of Radiographic Studies:      Laboratory and Other Studies:     Medical Decision Making:        Procedures  [] No procedures were performed in office today.    Enriqueta was seen today for follow-up.    Diagnoses and all orders for this visit:    Sprain of other ligament of right ankle, initial encounter  -     MRI Ankle Right Without Contrast    Posterior tibial tendon dysfunction  -     MRI Ankle Right Without Contrast          Recommendations/Plan:  I discussed with her and her family today that unfortunately until the MRIs performed is not much else we can do.  She is doing therapy as well as bracing and anti-inflammatories.  We still discussed injection but I explained if she does have a tendon tear of his will not really help the tendon heal.  It may be short-lived pain relief that's about it.  Also explained that it will not help repair any torn ligaments.  She will continue with therapy.  Continue bracing.  Continue rice.  I'll again submit a stat MRI order for the right ankle and have our  call [facility] and determine the delay.  She is now 6wks sp injury with no improvement at all in symptoms and has exhausted all conservative therapy. She has been offloaded and nwb, given oral and topical anti-inflammatories, done 6wks of PT.  At this point, until further imaging is performed I have nothing else to offer her.  Return after mri.  Patient agreeable to call or return sooner for any concerns.

## 2018-01-21 ENCOUNTER — HOSPITAL ENCOUNTER (EMERGENCY)
Facility: HOSPITAL | Age: 28
Discharge: HOME | End: 2018-01-21
Attending: STUDENT IN AN ORGANIZED HEALTH CARE EDUCATION/TRAINING PROGRAM | Admitting: STUDENT IN AN ORGANIZED HEALTH CARE EDUCATION/TRAINING PROGRAM

## 2018-01-21 VITALS
HEART RATE: 118 BPM | RESPIRATION RATE: 16 BRPM | OXYGEN SATURATION: 94 % | BODY MASS INDEX: 39.11 KG/M2 | WEIGHT: 194 LBS | SYSTOLIC BLOOD PRESSURE: 90 MMHG | DIASTOLIC BLOOD PRESSURE: 55 MMHG | HEIGHT: 59 IN | TEMPERATURE: 99.6 F

## 2018-01-21 DIAGNOSIS — J10.1 INFLUENZA A: Primary | ICD-10-CM

## 2018-01-21 LAB
FLUAV AG NPH QL: POSITIVE
FLUBV AG NPH QL IA: NEGATIVE

## 2018-01-21 PROCEDURE — 87804 INFLUENZA ASSAY W/OPTIC: CPT | Performed by: STUDENT IN AN ORGANIZED HEALTH CARE EDUCATION/TRAINING PROGRAM

## 2018-01-21 PROCEDURE — 99283 EMERGENCY DEPT VISIT LOW MDM: CPT

## 2018-01-21 RX ORDER — IBUPROFEN 600 MG/1
600 TABLET ORAL ONCE
Status: COMPLETED | OUTPATIENT
Start: 2018-01-21 | End: 2018-01-21

## 2018-01-21 RX ORDER — DEXTROMETHORPHAN POLISTIREX 30 MG/5ML
10 SUSPENSION ORAL EVERY 12 HOURS SCHEDULED
Qty: 280 ML | Refills: 0 | Status: SHIPPED | OUTPATIENT
Start: 2018-01-21 | End: 2022-02-09

## 2018-01-21 RX ORDER — OSELTAMIVIR PHOSPHATE 75 MG/1
75 CAPSULE ORAL EVERY 12 HOURS SCHEDULED
Status: DISCONTINUED | OUTPATIENT
Start: 2018-01-21 | End: 2018-01-21

## 2018-01-21 RX ORDER — OSELTAMIVIR PHOSPHATE 75 MG/1
75 CAPSULE ORAL 2 TIMES DAILY
Qty: 10 CAPSULE | Refills: 0 | Status: SHIPPED | OUTPATIENT
Start: 2018-01-21 | End: 2022-02-09

## 2018-01-21 RX ADMIN — IBUPROFEN 600 MG: 600 TABLET ORAL at 06:51

## 2018-01-21 NOTE — ED PROVIDER NOTES
Subjective   HPI Comments: 27-year-old female that presents with 1 day of progressively worsening cough, headache, body aches and fever.  Patient states nothing makes his better or worse.  States her fever has gotten up to 102.8 Fahrenheit.  Patient reports she is unable to sleep because of her coughing.      Review of Systems   All other systems reviewed and are negative.      Past Medical History:   Diagnosis Date   • Ankle sprain     right   • Anxiety    • Bipolar disorder    • Carpal tunnel syndrome    • Chronic pain disorder     Fibroymyalgia   • Tear of meniscus of knee     left        Allergies   Allergen Reactions   • Codeine    • Penicillins    • Sulfa Antibiotics        Past Surgical History:   Procedure Laterality Date   • CARPAL TUNNEL RELEASE     •  SECTION     • CHOLECYSTECTOMY     • LAPAROSCOPIC TUBAL LIGATION     • TONSILLECTOMY AND ADENOIDECTOMY     • TUBAL ABDOMINAL LIGATION         Family History   Problem Relation Age of Onset   • Osteoarthritis Other    • Osteoporosis Other    • Heart disease Other    • Hypertension Other    • Asthma Other    • Diabetes Other    • Kidney disease Other    • Stroke Other        Social History     Social History   • Marital status:      Spouse name: N/A   • Number of children: N/A   • Years of education: N/A     Social History Main Topics   • Smoking status: Current Every Day Smoker     Packs/day: 0.50     Years: 1.00     Types: Cigarettes     Start date: 2016   • Smokeless tobacco: Never Used      Comment: Trying to quit.   • Alcohol use Yes      Comment: rarely   • Drug use: No   • Sexual activity: Defer     Other Topics Concern   • None     Social History Narrative           Objective   Physical Exam   Nursing note and vitals reviewed.    GEN: Patient looks ill, but nontoxic.  Coughing during exam.   Head: Normocephalic, atraumatic  Eyes: Pupils equal round reactive to light  ENT: Posterior pharynx mildly erythematous otherwise normal in  appearance, oral mucosa is moist  Chest: Nontender to palpation  Cardiovascular: Tachycardic in the 120s   Lungs: Clear to auscultation bilaterally  Abdomen: Soft, nontender, nondistended, no peritoneal signs  Extremities: No edema, normal appearance  Neuro: GCS 15  Psych: Mood and affect are appropriate    Procedures         ED Course  ED Course                  MDM  Number of Diagnoses or Management Options  Influenza A:   Diagnosis management comments: Patient is influenza positive.  She is within the treatment window.       Amount and/or Complexity of Data Reviewed  Clinical lab tests: ordered and reviewed        Final diagnoses:   Influenza A            Stephen Donovan MD  01/21/18 [id number]

## 2021-08-22 ENCOUNTER — HOSPITAL ENCOUNTER (EMERGENCY)
Facility: HOSPITAL | Age: 31
Discharge: LEFT BEFORE DISCHARGE | End: 2021-08-23
Admitting: STUDENT IN AN ORGANIZED HEALTH CARE EDUCATION/TRAINING PROGRAM

## 2021-08-22 VITALS
WEIGHT: 195 LBS | DIASTOLIC BLOOD PRESSURE: 83 MMHG | RESPIRATION RATE: 18 BRPM | HEIGHT: 59 IN | TEMPERATURE: 100 F | SYSTOLIC BLOOD PRESSURE: 150 MMHG | BODY MASS INDEX: 39.31 KG/M2 | HEART RATE: 106 BPM | OXYGEN SATURATION: 96 %

## 2021-08-22 PROCEDURE — 99282 EMERGENCY DEPT VISIT SF MDM: CPT

## 2021-08-22 PROCEDURE — 93005 ELECTROCARDIOGRAM TRACING: CPT

## 2021-08-22 PROCEDURE — 93010 ELECTROCARDIOGRAM REPORT: CPT | Performed by: INTERNAL MEDICINE

## 2021-08-23 ENCOUNTER — APPOINTMENT (OUTPATIENT)
Dept: GENERAL RADIOLOGY | Facility: HOSPITAL | Age: 31
End: 2021-08-23

## 2021-08-23 LAB
ALBUMIN SERPL-MCNC: 4.32 G/DL (ref 3.5–5.2)
ALBUMIN/GLOB SERPL: 1.4 G/DL
ALP SERPL-CCNC: 73 U/L (ref 39–117)
ALT SERPL W P-5'-P-CCNC: 33 U/L (ref 1–33)
ANION GAP SERPL CALCULATED.3IONS-SCNC: 10.6 MMOL/L (ref 5–15)
AST SERPL-CCNC: 22 U/L (ref 1–32)
B PARAPERT DNA SPEC QL NAA+PROBE: NOT DETECTED
B PERT DNA SPEC QL NAA+PROBE: NOT DETECTED
BASOPHILS # BLD AUTO: 0.06 10*3/MM3 (ref 0–0.2)
BASOPHILS NFR BLD AUTO: 0.6 % (ref 0–1.5)
BILIRUB SERPL-MCNC: 0.2 MG/DL (ref 0–1.2)
BUN SERPL-MCNC: 8 MG/DL (ref 6–20)
BUN/CREAT SERPL: 10.5 (ref 7–25)
C PNEUM DNA NPH QL NAA+NON-PROBE: NOT DETECTED
CALCIUM SPEC-SCNC: 9.7 MG/DL (ref 8.6–10.5)
CHLORIDE SERPL-SCNC: 104 MMOL/L (ref 98–107)
CO2 SERPL-SCNC: 23.4 MMOL/L (ref 22–29)
CREAT SERPL-MCNC: 0.76 MG/DL (ref 0.57–1)
DEPRECATED RDW RBC AUTO: 43.5 FL (ref 37–54)
EOSINOPHIL # BLD AUTO: 0.16 10*3/MM3 (ref 0–0.4)
EOSINOPHIL NFR BLD AUTO: 1.7 % (ref 0.3–6.2)
ERYTHROCYTE [DISTWIDTH] IN BLOOD BY AUTOMATED COUNT: 13.3 % (ref 12.3–15.4)
FLUAV SUBTYP SPEC NAA+PROBE: NOT DETECTED
FLUBV RNA ISLT QL NAA+PROBE: NOT DETECTED
GFR SERPL CREATININE-BSD FRML MDRD: 88 ML/MIN/1.73
GLOBULIN UR ELPH-MCNC: 3.1 GM/DL
GLUCOSE SERPL-MCNC: 88 MG/DL (ref 65–99)
HADV DNA SPEC NAA+PROBE: NOT DETECTED
HCOV 229E RNA SPEC QL NAA+PROBE: NOT DETECTED
HCOV HKU1 RNA SPEC QL NAA+PROBE: NOT DETECTED
HCOV NL63 RNA SPEC QL NAA+PROBE: NOT DETECTED
HCOV OC43 RNA SPEC QL NAA+PROBE: NOT DETECTED
HCT VFR BLD AUTO: 45 % (ref 34–46.6)
HGB BLD-MCNC: 14.3 G/DL (ref 12–15.9)
HMPV RNA NPH QL NAA+NON-PROBE: NOT DETECTED
HOLD SPECIMEN: NORMAL
HOLD SPECIMEN: NORMAL
HPIV1 RNA SPEC QL NAA+PROBE: NOT DETECTED
HPIV2 RNA SPEC QL NAA+PROBE: NOT DETECTED
HPIV3 RNA NPH QL NAA+PROBE: NOT DETECTED
HPIV4 P GENE NPH QL NAA+PROBE: NOT DETECTED
IMM GRANULOCYTES # BLD AUTO: 0.03 10*3/MM3 (ref 0–0.05)
IMM GRANULOCYTES NFR BLD AUTO: 0.3 % (ref 0–0.5)
LYMPHOCYTES # BLD AUTO: 1.87 10*3/MM3 (ref 0.7–3.1)
LYMPHOCYTES NFR BLD AUTO: 20.2 % (ref 19.6–45.3)
M PNEUMO IGG SER IA-ACNC: NOT DETECTED
MCH RBC QN AUTO: 28.1 PG (ref 26.6–33)
MCHC RBC AUTO-ENTMCNC: 31.8 G/DL (ref 31.5–35.7)
MCV RBC AUTO: 88.4 FL (ref 79–97)
MONOCYTES # BLD AUTO: 1.08 10*3/MM3 (ref 0.1–0.9)
MONOCYTES NFR BLD AUTO: 11.7 % (ref 5–12)
NEUTROPHILS NFR BLD AUTO: 6.04 10*3/MM3 (ref 1.7–7)
NEUTROPHILS NFR BLD AUTO: 65.5 % (ref 42.7–76)
NRBC BLD AUTO-RTO: 0 /100 WBC (ref 0–0.2)
PLATELET # BLD AUTO: 237 10*3/MM3 (ref 140–450)
PMV BLD AUTO: 9.8 FL (ref 6–12)
POTASSIUM SERPL-SCNC: 4.2 MMOL/L (ref 3.5–5.2)
PROT SERPL-MCNC: 7.4 G/DL (ref 6–8.5)
QT INTERVAL: 338 MS
QT INTERVAL: 344 MS
QTC INTERVAL: 443 MS
QTC INTERVAL: 452 MS
RBC # BLD AUTO: 5.09 10*6/MM3 (ref 3.77–5.28)
RHINOVIRUS RNA SPEC NAA+PROBE: NOT DETECTED
RSV RNA NPH QL NAA+NON-PROBE: NOT DETECTED
SARS-COV-2 RNA NPH QL NAA+NON-PROBE: DETECTED
SODIUM SERPL-SCNC: 138 MMOL/L (ref 136–145)
TROPONIN T SERPL-MCNC: <0.01 NG/ML (ref 0–0.03)
TROPONIN T SERPL-MCNC: <0.01 NG/ML (ref 0–0.03)
WBC # BLD AUTO: 9.24 10*3/MM3 (ref 3.4–10.8)
WHOLE BLOOD HOLD SPECIMEN: NORMAL

## 2021-08-23 PROCEDURE — 93005 ELECTROCARDIOGRAM TRACING: CPT | Performed by: STUDENT IN AN ORGANIZED HEALTH CARE EDUCATION/TRAINING PROGRAM

## 2021-08-23 PROCEDURE — 71045 X-RAY EXAM CHEST 1 VIEW: CPT

## 2021-08-23 PROCEDURE — 85025 COMPLETE CBC W/AUTO DIFF WBC: CPT | Performed by: STUDENT IN AN ORGANIZED HEALTH CARE EDUCATION/TRAINING PROGRAM

## 2021-08-23 PROCEDURE — 84484 ASSAY OF TROPONIN QUANT: CPT | Performed by: STUDENT IN AN ORGANIZED HEALTH CARE EDUCATION/TRAINING PROGRAM

## 2021-08-23 PROCEDURE — 80053 COMPREHEN METABOLIC PANEL: CPT | Performed by: STUDENT IN AN ORGANIZED HEALTH CARE EDUCATION/TRAINING PROGRAM

## 2021-08-23 PROCEDURE — 93010 ELECTROCARDIOGRAM REPORT: CPT | Performed by: INTERNAL MEDICINE

## 2021-08-23 PROCEDURE — 0202U NFCT DS 22 TRGT SARS-COV-2: CPT | Performed by: STUDENT IN AN ORGANIZED HEALTH CARE EDUCATION/TRAINING PROGRAM

## 2021-08-23 RX ORDER — SODIUM CHLORIDE 0.9 % (FLUSH) 0.9 %
10 SYRINGE (ML) INJECTION AS NEEDED
Status: DISCONTINUED | OUTPATIENT
Start: 2021-08-23 | End: 2021-08-23 | Stop reason: HOSPADM

## 2021-08-23 RX ORDER — DEXAMETHASONE 4 MG/1
6 TABLET ORAL ONCE
Status: DISCONTINUED | OUTPATIENT
Start: 2021-08-23 | End: 2021-08-23 | Stop reason: HOSPADM

## 2021-08-23 RX ORDER — AZITHROMYCIN 250 MG/1
500 TABLET, FILM COATED ORAL ONCE
Status: DISCONTINUED | OUTPATIENT
Start: 2021-08-23 | End: 2021-08-23 | Stop reason: HOSPADM

## 2021-08-23 NOTE — ED NOTES
MEDICAL SCREENING:    Reason for Visit: Chest Pain/SOB    Patient initially seen in triage.  The patient was advised further evaluation and diagnostic testing will be needed, some of the treatment and testing will be initiated in the lobby in order to begin the process.  The patient will be returned to the waiting area for the time being and possibly be re-assessed by a subsequent ED provider.  The patient will be brought back to the treatment area in as timely manner as possible.         Van Silverman, DO  08/23/21 0038

## 2021-11-16 ENCOUNTER — APPOINTMENT (OUTPATIENT)
Dept: CT IMAGING | Facility: HOSPITAL | Age: 31
End: 2021-11-16

## 2021-11-16 ENCOUNTER — HOSPITAL ENCOUNTER (EMERGENCY)
Facility: HOSPITAL | Age: 31
Discharge: HOME | End: 2021-11-16
Attending: EMERGENCY MEDICINE | Admitting: EMERGENCY MEDICINE

## 2021-11-16 VITALS
WEIGHT: 206 LBS | HEART RATE: 105 BPM | TEMPERATURE: 98 F | RESPIRATION RATE: 18 BRPM | BODY MASS INDEX: 41.53 KG/M2 | DIASTOLIC BLOOD PRESSURE: 88 MMHG | HEIGHT: 59 IN | OXYGEN SATURATION: 96 % | SYSTOLIC BLOOD PRESSURE: 146 MMHG

## 2021-11-16 DIAGNOSIS — N83.201 BILATERAL OVARIAN CYSTS: Primary | ICD-10-CM

## 2021-11-16 DIAGNOSIS — N83.202 BILATERAL OVARIAN CYSTS: Primary | ICD-10-CM

## 2021-11-16 LAB
ALBUMIN SERPL-MCNC: 4.42 G/DL (ref 3.5–5.2)
ALBUMIN/GLOB SERPL: 1.3 G/DL
ALP SERPL-CCNC: 69 U/L (ref 39–117)
ALT SERPL W P-5'-P-CCNC: 34 U/L (ref 1–33)
ANION GAP SERPL CALCULATED.3IONS-SCNC: 12.4 MMOL/L (ref 5–15)
AST SERPL-CCNC: 32 U/L (ref 1–32)
BASOPHILS # BLD AUTO: 0.06 10*3/MM3 (ref 0–0.2)
BASOPHILS NFR BLD AUTO: 0.5 % (ref 0–1.5)
BILIRUB SERPL-MCNC: 0.4 MG/DL (ref 0–1.2)
BILIRUB UR QL STRIP: NEGATIVE
BUN SERPL-MCNC: 8 MG/DL (ref 6–20)
BUN/CREAT SERPL: 11.1 (ref 7–25)
CALCIUM SPEC-SCNC: 9.7 MG/DL (ref 8.6–10.5)
CHLORIDE SERPL-SCNC: 106 MMOL/L (ref 98–107)
CLARITY UR: CLEAR
CO2 SERPL-SCNC: 22.6 MMOL/L (ref 22–29)
COLOR UR: YELLOW
CREAT SERPL-MCNC: 0.72 MG/DL (ref 0.57–1)
CRP SERPL-MCNC: 0.8 MG/DL (ref 0–0.5)
DEPRECATED RDW RBC AUTO: 42.5 FL (ref 37–54)
EOSINOPHIL # BLD AUTO: 0.17 10*3/MM3 (ref 0–0.4)
EOSINOPHIL NFR BLD AUTO: 1.4 % (ref 0.3–6.2)
ERYTHROCYTE [DISTWIDTH] IN BLOOD BY AUTOMATED COUNT: 13.2 % (ref 12.3–15.4)
GFR SERPL CREATININE-BSD FRML MDRD: 94 ML/MIN/1.73
GLOBULIN UR ELPH-MCNC: 3.3 GM/DL
GLUCOSE SERPL-MCNC: 89 MG/DL (ref 65–99)
GLUCOSE UR STRIP-MCNC: NEGATIVE MG/DL
HCT VFR BLD AUTO: 45.5 % (ref 34–46.6)
HGB BLD-MCNC: 14.5 G/DL (ref 12–15.9)
HGB UR QL STRIP.AUTO: NEGATIVE
HOLD SPECIMEN: NORMAL
HOLD SPECIMEN: NORMAL
IMM GRANULOCYTES # BLD AUTO: 0.05 10*3/MM3 (ref 0–0.05)
IMM GRANULOCYTES NFR BLD AUTO: 0.4 % (ref 0–0.5)
KETONES UR QL STRIP: NEGATIVE
LEUKOCYTE ESTERASE UR QL STRIP.AUTO: NEGATIVE
LIPASE SERPL-CCNC: 40 U/L (ref 13–60)
LYMPHOCYTES # BLD AUTO: 4.27 10*3/MM3 (ref 0.7–3.1)
LYMPHOCYTES NFR BLD AUTO: 34.1 % (ref 19.6–45.3)
MCH RBC QN AUTO: 28 PG (ref 26.6–33)
MCHC RBC AUTO-ENTMCNC: 31.9 G/DL (ref 31.5–35.7)
MCV RBC AUTO: 87.8 FL (ref 79–97)
MONOCYTES # BLD AUTO: 0.87 10*3/MM3 (ref 0.1–0.9)
MONOCYTES NFR BLD AUTO: 6.9 % (ref 5–12)
NEUTROPHILS NFR BLD AUTO: 56.7 % (ref 42.7–76)
NEUTROPHILS NFR BLD AUTO: 7.11 10*3/MM3 (ref 1.7–7)
NITRITE UR QL STRIP: NEGATIVE
NRBC BLD AUTO-RTO: 0 /100 WBC (ref 0–0.2)
PH UR STRIP.AUTO: 6 [PH] (ref 5–8)
PLATELET # BLD AUTO: 269 10*3/MM3 (ref 140–450)
PMV BLD AUTO: 10.2 FL (ref 6–12)
POTASSIUM SERPL-SCNC: 4.5 MMOL/L (ref 3.5–5.2)
PROT SERPL-MCNC: 7.7 G/DL (ref 6–8.5)
PROT UR QL STRIP: NEGATIVE
RBC # BLD AUTO: 5.18 10*6/MM3 (ref 3.77–5.28)
SODIUM SERPL-SCNC: 141 MMOL/L (ref 136–145)
SP GR UR STRIP: 1.01 (ref 1–1.03)
UROBILINOGEN UR QL STRIP: NORMAL
WBC # BLD AUTO: 12.53 10*3/MM3 (ref 3.4–10.8)
WHOLE BLOOD HOLD SPECIMEN: NORMAL
WHOLE BLOOD HOLD SPECIMEN: NORMAL

## 2021-11-16 PROCEDURE — 80053 COMPREHEN METABOLIC PANEL: CPT | Performed by: PHYSICIAN ASSISTANT

## 2021-11-16 PROCEDURE — 81003 URINALYSIS AUTO W/O SCOPE: CPT | Performed by: PHYSICIAN ASSISTANT

## 2021-11-16 PROCEDURE — 25010000002 ONDANSETRON PER 1 MG: Performed by: EMERGENCY MEDICINE

## 2021-11-16 PROCEDURE — 74177 CT ABD & PELVIS W/CONTRAST: CPT

## 2021-11-16 PROCEDURE — 86140 C-REACTIVE PROTEIN: CPT | Performed by: PHYSICIAN ASSISTANT

## 2021-11-16 PROCEDURE — 83690 ASSAY OF LIPASE: CPT | Performed by: PHYSICIAN ASSISTANT

## 2021-11-16 PROCEDURE — 85025 COMPLETE CBC W/AUTO DIFF WBC: CPT | Performed by: PHYSICIAN ASSISTANT

## 2021-11-16 PROCEDURE — 25010000002 HYDROMORPHONE PER 4 MG: Performed by: EMERGENCY MEDICINE

## 2021-11-16 PROCEDURE — 99283 EMERGENCY DEPT VISIT LOW MDM: CPT

## 2021-11-16 PROCEDURE — 25010000002 IOPAMIDOL 61 % SOLUTION: Performed by: EMERGENCY MEDICINE

## 2021-11-16 PROCEDURE — 96374 THER/PROPH/DIAG INJ IV PUSH: CPT

## 2021-11-16 PROCEDURE — 96375 TX/PRO/DX INJ NEW DRUG ADDON: CPT

## 2021-11-16 RX ORDER — IBUPROFEN 800 MG/1
800 TABLET ORAL
Qty: 30 TABLET | Refills: 0 | Status: SHIPPED | OUTPATIENT
Start: 2021-11-16 | End: 2022-02-09

## 2021-11-16 RX ORDER — HYDROMORPHONE HYDROCHLORIDE 1 MG/ML
0.5 INJECTION, SOLUTION INTRAMUSCULAR; INTRAVENOUS; SUBCUTANEOUS ONCE
Status: COMPLETED | OUTPATIENT
Start: 2021-11-16 | End: 2021-11-16

## 2021-11-16 RX ORDER — SODIUM CHLORIDE 0.9 % (FLUSH) 0.9 %
10 SYRINGE (ML) INJECTION AS NEEDED
Status: DISCONTINUED | OUTPATIENT
Start: 2021-11-16 | End: 2021-11-16 | Stop reason: HOSPADM

## 2021-11-16 RX ORDER — ONDANSETRON 2 MG/ML
4 INJECTION INTRAMUSCULAR; INTRAVENOUS ONCE
Status: COMPLETED | OUTPATIENT
Start: 2021-11-16 | End: 2021-11-16

## 2021-11-16 RX ADMIN — IOPAMIDOL 80 ML: 612 INJECTION, SOLUTION INTRAVENOUS at 19:55

## 2021-11-16 RX ADMIN — SODIUM CHLORIDE 1000 ML: 9 INJECTION, SOLUTION INTRAVENOUS at 18:32

## 2021-11-16 RX ADMIN — HYDROMORPHONE HYDROCHLORIDE 0.5 MG: 1 INJECTION, SOLUTION INTRAMUSCULAR; INTRAVENOUS; SUBCUTANEOUS at 18:31

## 2021-11-16 RX ADMIN — ONDANSETRON 4 MG: 2 INJECTION INTRAMUSCULAR; INTRAVENOUS at 18:31

## 2021-11-16 NOTE — ED PROVIDER NOTES
Subjective   30-year-old female with past medical history of anxiety, bipolar disorder, carpal tunnel syndrome, chronic pain disorder, and meniscal tear presents to the emergency room with right lower quadrant abdominal pain.  She also reports nausea and vomiting.  She does state that the pain is getting worse and has gotten worse throughout the day.  She denies fever, chills, or body aches.  Denies any chance of pregnancy, as states she has had a tubal ligation in the past.  Denies being around any sick contacts or travel.  Aggravating factors do include movement.  Denies any alleviating factors.  Denies any other complaints or concerns at this time.      History provided by:  Patient   used: No        Review of Systems   Constitutional: Negative.  Negative for fever.   HENT: Negative.    Respiratory: Negative.    Cardiovascular: Negative.  Negative for chest pain.   Gastrointestinal: Positive for abdominal pain, nausea and vomiting.   Endocrine: Negative.    Genitourinary: Negative.  Negative for dysuria.   Skin: Negative.    Neurological: Negative.    Psychiatric/Behavioral: Negative.    All other systems reviewed and are negative.      Past Medical History:   Diagnosis Date   • Ankle sprain     right   • Anxiety    • Bipolar disorder (CMS/HCC)    • Carpal tunnel syndrome    • Chronic pain disorder     Fibroymyalgia   • Tear of meniscus of knee     left        Allergies   Allergen Reactions   • Codeine    • Penicillins    • Sulfa Antibiotics        Past Surgical History:   Procedure Laterality Date   • CARPAL TUNNEL RELEASE     •  SECTION     • CHOLECYSTECTOMY     • LAPAROSCOPIC TUBAL LIGATION     • TONSILLECTOMY AND ADENOIDECTOMY     • TUBAL ABDOMINAL LIGATION         Family History   Problem Relation Age of Onset   • Osteoarthritis Other    • Osteoporosis Other    • Heart disease Other    • Hypertension Other    • Asthma Other    • Diabetes Other    • Kidney disease Other    • Stroke  Other        Social History     Socioeconomic History   • Marital status:    Tobacco Use   • Smoking status: Current Every Day Smoker     Packs/day: 0.50     Years: 1.00     Pack years: 0.50     Types: Cigarettes     Start date: 1/17/2016   • Smokeless tobacco: Never Used   • Tobacco comment: Trying to quit.   Substance and Sexual Activity   • Alcohol use: Yes     Comment: rarely   • Drug use: No   • Sexual activity: Defer           Objective   Physical Exam  Vitals and nursing note reviewed.   Constitutional:       General: She is not in acute distress.     Appearance: She is well-developed. She is not diaphoretic.   HENT:      Head: Normocephalic and atraumatic.      Right Ear: External ear normal.      Left Ear: External ear normal.      Nose: Nose normal.   Eyes:      Conjunctiva/sclera: Conjunctivae normal.      Pupils: Pupils are equal, round, and reactive to light.   Neck:      Vascular: No JVD.      Trachea: No tracheal deviation.   Cardiovascular:      Rate and Rhythm: Normal rate and regular rhythm.      Heart sounds: Normal heart sounds. No murmur heard.      Pulmonary:      Effort: Pulmonary effort is normal. No respiratory distress.      Breath sounds: Normal breath sounds. No wheezing.   Abdominal:      General: Bowel sounds are normal.      Palpations: Abdomen is soft.      Tenderness: There is abdominal tenderness in the right lower quadrant.   Musculoskeletal:         General: No deformity. Normal range of motion.      Cervical back: Normal range of motion and neck supple.   Skin:     General: Skin is warm and dry.      Coloration: Skin is not pale.      Findings: No erythema or rash.   Neurological:      Mental Status: She is alert and oriented to person, place, and time.      Cranial Nerves: No cranial nerve deficit.   Psychiatric:         Behavior: Behavior normal.         Thought Content: Thought content normal.         Procedures           ED Course  ED Course as of 11/16/21 2303   Tue  Nov 16, 2021 2014 CT Abdomen Pelvis With Contrast [TK]      ED Course User Index  [TK] Maria Elena Flanagan PA-C                                           MDM  Number of Diagnoses or Management Options  Bilateral ovarian cysts: new and requires workup     Amount and/or Complexity of Data Reviewed  Clinical lab tests: reviewed and ordered  Tests in the radiology section of CPT®: reviewed and ordered    Risk of Complications, Morbidity, and/or Mortality  Presenting problems: moderate  Diagnostic procedures: moderate  Management options: moderate    Patient Progress  Patient progress: stable      Final diagnoses:   Bilateral ovarian cysts       ED Disposition  ED Disposition     ED Disposition Condition Comment    Discharge Stable           Donta Oden, DO  [address] [facility]  [address]  [place] [address]  [phone]    In 2 days           Medication List      New Prescriptions    ibuprofen 800 MG tablet  Commonly known as: ADVIL,MOTRIN  Take 1 tablet by mouth 3 (Three) Times a Day With Meals.           Where to Get Your Medications      These medications were sent to [facility] [address] [place] [address] - [phone]  - [phone] [address] [address]    Phone: [phone]   · ibuprofen 800 MG tablet          Maria Elena Flanagan PA-C  11/16/21 7087

## 2022-01-21 ENCOUNTER — HOSPITAL ENCOUNTER (EMERGENCY)
Facility: HOSPITAL | Age: 32
Discharge: HOME | End: 2022-01-21
Attending: EMERGENCY MEDICINE | Admitting: EMERGENCY MEDICINE

## 2022-01-21 VITALS
OXYGEN SATURATION: 97 % | HEART RATE: 98 BPM | HEIGHT: 59 IN | DIASTOLIC BLOOD PRESSURE: 69 MMHG | RESPIRATION RATE: 18 BRPM | TEMPERATURE: 98.2 F | WEIGHT: 206 LBS | BODY MASS INDEX: 41.53 KG/M2 | SYSTOLIC BLOOD PRESSURE: 105 MMHG

## 2022-01-21 DIAGNOSIS — J06.9 VIRAL URI: Primary | ICD-10-CM

## 2022-01-21 LAB
FLUAV RNA RESP QL NAA+PROBE: NOT DETECTED
FLUBV RNA RESP QL NAA+PROBE: NOT DETECTED
SARS-COV-2 RNA RESP QL NAA+PROBE: NOT DETECTED

## 2022-01-21 PROCEDURE — 99283 EMERGENCY DEPT VISIT LOW MDM: CPT

## 2022-01-21 PROCEDURE — C9803 HOPD COVID-19 SPEC COLLECT: HCPCS

## 2022-01-21 PROCEDURE — 87636 SARSCOV2 & INF A&B AMP PRB: CPT | Performed by: PHYSICIAN ASSISTANT

## 2022-01-21 RX ORDER — FLUTICASONE PROPIONATE 50 MCG
2 SPRAY, SUSPENSION (ML) NASAL DAILY
Qty: 9.9 ML | Refills: 0 | Status: SHIPPED | OUTPATIENT
Start: 2022-01-21 | End: 2022-02-09 | Stop reason: SDUPTHER

## 2022-01-21 RX ORDER — BENZONATATE 200 MG/1
200 CAPSULE ORAL 3 TIMES DAILY PRN
Qty: 30 CAPSULE | Refills: 0 | Status: SHIPPED | OUTPATIENT
Start: 2022-01-21 | End: 2022-02-09

## 2022-01-21 RX ORDER — ONDANSETRON 4 MG/1
4 TABLET, ORALLY DISINTEGRATING ORAL EVERY 6 HOURS PRN
Qty: 12 TABLET | Refills: 0 | Status: SHIPPED | OUTPATIENT
Start: 2022-01-21 | End: 2022-02-09

## 2022-01-21 RX ORDER — CETIRIZINE HYDROCHLORIDE 10 MG/1
10 TABLET ORAL DAILY
Qty: 30 TABLET | Refills: 0 | Status: SHIPPED | OUTPATIENT
Start: 2022-01-21 | End: 2022-02-09 | Stop reason: SDUPTHER

## 2022-01-21 NOTE — ED PROVIDER NOTES
Subjective     History provided by:  Patient   used: No    URI  Presenting symptoms: congestion, cough and rhinorrhea    Severity:  Mild  Onset quality:  Sudden  Duration:  2 days  Timing:  Constant  Progression:  Worsening  Chronicity:  New  Relieved by:  Nothing  Worsened by:  Nothing  Ineffective treatments:  None tried  Risk factors: sick contacts    Risk factors comment:  COVID exposure       Review of Systems   Constitutional: Negative.    HENT: Positive for congestion and rhinorrhea.    Eyes: Negative.    Respiratory: Positive for cough.    Cardiovascular: Negative.    Gastrointestinal: Negative.    Endocrine: Negative.    Genitourinary: Negative.    Musculoskeletal: Negative.    Skin: Negative.    Allergic/Immunologic: Negative.    Neurological: Negative.    Hematological: Negative.    Psychiatric/Behavioral: Negative.    All other systems reviewed and are negative.      Past Medical History:   Diagnosis Date   • Ankle sprain     right   • Anxiety    • Bipolar disorder (CMS/HCC)    • Carpal tunnel syndrome    • Chronic pain disorder     Fibroymyalgia   • Tear of meniscus of knee     left        Allergies   Allergen Reactions   • Codeine    • Penicillins    • Sulfa Antibiotics        Past Surgical History:   Procedure Laterality Date   • CARPAL TUNNEL RELEASE     •  SECTION     • CHOLECYSTECTOMY     • LAPAROSCOPIC TUBAL LIGATION     • TONSILLECTOMY AND ADENOIDECTOMY     • TUBAL ABDOMINAL LIGATION         Family History   Problem Relation Age of Onset   • Osteoarthritis Other    • Osteoporosis Other    • Heart disease Other    • Hypertension Other    • Asthma Other    • Diabetes Other    • Kidney disease Other    • Stroke Other        Social History     Socioeconomic History   • Marital status:    Tobacco Use   • Smoking status: Current Every Day Smoker     Packs/day: 0.50     Years: 1.00     Pack years: 0.50     Types: Cigarettes     Start date: 2016   • Smokeless  tobacco: Never Used   • Tobacco comment: Trying to quit.   Substance and Sexual Activity   • Alcohol use: Yes     Comment: rarely   • Drug use: No   • Sexual activity: Defer           Objective   Physical Exam  Vitals and nursing note reviewed.   Constitutional:       Appearance: Normal appearance. She is normal weight.   HENT:      Head: Normocephalic and atraumatic.      Right Ear: External ear normal.      Left Ear: External ear normal.      Nose: Congestion present.      Mouth/Throat:      Mouth: Mucous membranes are moist.      Pharynx: Oropharynx is clear.   Eyes:      Extraocular Movements: Extraocular movements intact.      Conjunctiva/sclera: Conjunctivae normal.      Pupils: Pupils are equal, round, and reactive to light.   Cardiovascular:      Rate and Rhythm: Normal rate and regular rhythm.      Pulses: Normal pulses.      Heart sounds: Normal heart sounds.   Pulmonary:      Effort: Pulmonary effort is normal.      Breath sounds: Normal breath sounds.   Abdominal:      General: Abdomen is flat. Bowel sounds are normal.      Palpations: Abdomen is soft.   Musculoskeletal:         General: Normal range of motion.      Cervical back: Normal range of motion and neck supple.   Skin:     General: Skin is warm and dry.      Capillary Refill: Capillary refill takes less than 2 seconds.   Neurological:      General: No focal deficit present.      Mental Status: She is alert and oriented to person, place, and time. Mental status is at baseline.   Psychiatric:         Mood and Affect: Mood normal.         Behavior: Behavior normal.         Thought Content: Thought content normal.         Judgment: Judgment normal.         Procedures           ED Course                                                 MDM  Number of Diagnoses or Management Options     Amount and/or Complexity of Data Reviewed  Clinical lab tests: ordered and reviewed    Risk of Complications, Morbidity, and/or Mortality  Presenting problems:  low  Diagnostic procedures: low  Management options: low    Patient Progress  Patient progress: improved      Final diagnoses:   Viral URI       ED Disposition  ED Disposition     ED Disposition Condition Comment    Discharge Stable           Laura Foreman DO  [address] [facility] [address] [phone]    Schedule an appointment as soon as possible for a visit in 1 day           Medication List      New Prescriptions    benzonatate 200 MG capsule  Commonly known as: TESSALON  Take 1 capsule by mouth 3 (Three) Times a Day As Needed for Cough.     cetirizine 10 MG tablet  Commonly known as: zyrTEC  Take 1 tablet by mouth Daily.     fluticasone 50 MCG/ACT nasal spray  Commonly known as: FLONASE  2 sprays into the nostril(s) as directed by provider Daily.     ondansetron ODT 4 MG disintegrating tablet  Commonly known as: ZOFRAN-ODT  Place 1 tablet on the tongue Every 6 (Six) Hours As Needed for Vomiting.           Where to Get Your Medications      These medications were sent to [facility] [address] [place] [address] [phone] [place] [phone] [address] [address]    Phone: [phone]   · benzonatate 200 MG capsule  · cetirizine 10 MG tablet  · fluticasone 50 MCG/ACT nasal spray  · ondansetron ODT 4 MG disintegrating tablet          Kaela Lange PA  01/21/22 5644

## 2022-01-21 NOTE — ED NOTES
MEDICAL SCREENING:    Reason for Visit: COVID sx x 2 days     Patient initially seen in triage.  The patient was advised further evaluation and diagnostic testing will be needed, some of the treatment and testing will be initiated in the lobby in order to begin the process.  The patient will be returned to the waiting area for the time being and possibly be re-assessed by a subsequent ED provider.  The patient will be brought back to the treatment area in as timely manner as possible.         Kaela Lange PA  01/21/22 6286

## 2022-02-09 ENCOUNTER — OFFICE VISIT (OUTPATIENT)
Dept: FAMILY MEDICINE CLINIC | Facility: CLINIC | Age: 32
End: 2022-02-09

## 2022-02-09 VITALS
HEIGHT: 59 IN | TEMPERATURE: 96.9 F | BODY MASS INDEX: 42.25 KG/M2 | DIASTOLIC BLOOD PRESSURE: 70 MMHG | WEIGHT: 209.6 LBS | HEART RATE: 86 BPM | SYSTOLIC BLOOD PRESSURE: 120 MMHG | OXYGEN SATURATION: 98 %

## 2022-02-09 DIAGNOSIS — J06.9 VIRAL URI WITH COUGH: Primary | ICD-10-CM

## 2022-02-09 DIAGNOSIS — J30.9 ALLERGIC RHINITIS, UNSPECIFIED SEASONALITY, UNSPECIFIED TRIGGER: ICD-10-CM

## 2022-02-09 DIAGNOSIS — F17.200 SMOKER: ICD-10-CM

## 2022-02-09 DIAGNOSIS — J01.80 ACUTE NON-RECURRENT SINUSITIS OF OTHER SINUS: ICD-10-CM

## 2022-02-09 LAB
EXPIRATION DATE: NORMAL
FLUAV RNA RESP QL NAA+PROBE: NOT DETECTED
FLUBV RNA RESP QL NAA+PROBE: NOT DETECTED
INTERNAL CONTROL: NORMAL
Lab: NORMAL
S PYO AG THROAT QL: NEGATIVE
SARS-COV-2 RNA RESP QL NAA+PROBE: NOT DETECTED

## 2022-02-09 PROCEDURE — 87880 STREP A ASSAY W/OPTIC: CPT | Performed by: NURSE PRACTITIONER

## 2022-02-09 PROCEDURE — 87636 SARSCOV2 & INF A&B AMP PRB: CPT | Performed by: NURSE PRACTITIONER

## 2022-02-09 PROCEDURE — 99203 OFFICE O/P NEW LOW 30 MIN: CPT | Performed by: NURSE PRACTITIONER

## 2022-02-09 RX ORDER — CETIRIZINE HYDROCHLORIDE 10 MG/1
10 TABLET ORAL DAILY
Qty: 30 TABLET | Refills: 2 | Status: SHIPPED | OUTPATIENT
Start: 2022-02-09 | End: 2022-05-18 | Stop reason: SDUPTHER

## 2022-02-09 RX ORDER — LORATADINE AND PSEUDOEPHEDRINE SULFATE 5; 120 MG/1; MG/1
1 TABLET, EXTENDED RELEASE ORAL 2 TIMES DAILY
Qty: 20 TABLET | Refills: 0 | Status: SHIPPED | OUTPATIENT
Start: 2022-02-09 | End: 2022-02-17

## 2022-02-09 RX ORDER — FLUTICASONE PROPIONATE 50 MCG
2 SPRAY, SUSPENSION (ML) NASAL DAILY
Qty: 15.8 ML | Refills: 0 | Status: SHIPPED | OUTPATIENT
Start: 2022-02-09 | End: 2022-04-11

## 2022-02-09 NOTE — PROGRESS NOTES
Chief Complaint  Sore Throat (earache )    Subjective          Enriqueta Gotti is a 31 y.o. female who presents today to [facility] for initial evaluation     HPI:   History of Present Illness    Presents to clinic for complaint of sore throat, earache, left-sided sinus pressure.  Symptoms started yesterday.  Has tried Aleve and tylenol w/o relief.  Ill contacts: Son recently ill with GI bug.    Requesting refill of Zyrtec.  Tolerates well with no issues or side effects.    Denies any other concerns or issues at this time.    The following portions of the patient's history were reviewed and updated as appropriate: allergies, current medications, past family history, past medical history, past social history, past surgical history and problem list.    Objective     Problem List:  Patient Active Problem List   Diagnosis   • Migraine headache   • MAURICE (obstructive sleep apnea)   • Syncope   • Anxiety   • Bipolar disorder (HCC)       Allergy:   Allergies   Allergen Reactions   • Codeine    • Penicillins    • Sulfa Antibiotics         Discontinued Medications:  Medications Discontinued During This Encounter   Medication Reason   • fluticasone (FLONASE) 50 MCG/ACT nasal spray Duplicate order   • albuterol (PROVENTIL HFA;VENTOLIN HFA) 108 (90 BASE) MCG/ACT inhaler Historical Med - Therapy completed   • benzonatate (TESSALON) 200 MG capsule Historical Med - Therapy completed   • dexamethasone (DECADRON) 4 MG/ML injection Historical Med - Therapy completed   • dextromethorphan polistirex ER (DELSYM) 30 MG/5ML Suspension Extended Release oral suspension Historical Med - Therapy completed   • doxepin (SINEquan) 10 MG capsule Historical Med - Therapy completed   • FLOVENT HFA 44 MCG/ACT inhaler Historical Med - Therapy completed   • LORazepam (ATIVAN) 1 MG tablet Historical Med - Therapy completed   • HYDROcodone-acetaminophen (NORCO) 7.5-325 MG per tablet Historical Med - Therapy  completed   • ibuprofen (ADVIL,MOTRIN) 800 MG tablet Historical Med - Therapy completed   • MethylPREDNISolone (MEDROL, LENORE,) 4 MG tablet Historical Med - Therapy completed   • MUCUS RELIEF 400 MG tablet Historical Med - Therapy completed   • Omega-3 Fatty Acids (FISH OIL) 1000 MG capsule capsule Historical Med - Therapy completed   • vitamin D (ERGOCALCIFEROL) 68049 UNITS capsule capsule Historical Med - Therapy completed   • vilazodone (VIIBRYD) 20 MG tablet tablet Historical Med - Therapy completed   • predniSONE (DELTASONE) 20 MG tablet Historical Med - Therapy completed   • oseltamivir (TAMIFLU) 75 MG capsule Historical Med - Therapy completed   • ondansetron ODT (ZOFRAN-ODT) 4 MG disintegrating tablet Historical Med - Therapy completed   • cetirizine (zyrTEC) 10 MG tablet Reorder       Current Medications:   Current Outpatient Medications   Medication Sig Dispense Refill   • cetirizine (zyrTEC) 10 MG tablet Take 1 tablet by mouth Daily for 90 days. 30 tablet 2   • fluticasone (Flonase) 50 MCG/ACT nasal spray 2 sprays into the nostril(s) as directed by provider Daily for 30 days. 15.8 mL 0   • loratadine-pseudoephedrine (Claritin-D 12 Hour) 5-120 MG per 12 hr tablet Take 1 tablet by mouth 2 (Two) Times a Day for 10 days. 20 tablet 0     No current facility-administered medications for this visit.       Past Medical History:  Past Medical History:   Diagnosis Date   • Ankle sprain     right   • Anxiety    • Bipolar disorder (HCC)    • Carpal tunnel syndrome    • Chronic pain disorder     Fibroymyalgia   • Tear of meniscus of knee     left        Past Surgical History:  Past Surgical History:   Procedure Laterality Date   • CARPAL TUNNEL RELEASE     •  SECTION     • CHOLECYSTECTOMY     • LAPAROSCOPIC TUBAL LIGATION     • TONSILLECTOMY AND ADENOIDECTOMY     • TUBAL ABDOMINAL LIGATION         Social History:  Social History     Socioeconomic History   • Marital status:    Tobacco Use   • Smoking  status: Current Every Day Smoker     Packs/day: 0.50     Years: 1.00     Pack years: 0.50     Types: Cigarettes     Start date: 1/17/2016   • Smokeless tobacco: Never Used   • Tobacco comment: Trying to quit.   Vaping Use   • Vaping Use: Never used   Substance and Sexual Activity   • Alcohol use: Yes     Comment: rarely   • Drug use: No   • Sexual activity: Defer       Family History:  Family History   Problem Relation Age of Onset   • Osteoarthritis Other    • Osteoporosis Other    • Heart disease Other    • Hypertension Other    • Asthma Other    • Diabetes Other    • Kidney disease Other    • Stroke Other        Review of Systems:  Review of Systems   Constitutional: Negative for chills and fever.   HENT: Positive for congestion and rhinorrhea.    Respiratory: Positive for cough. Negative for shortness of breath and wheezing.    Musculoskeletal: Negative for arthralgias and myalgias.       Physical Exam:  Physical Exam  Vitals and nursing note reviewed.   Constitutional:       General: She is not in acute distress.     Appearance: Normal appearance. She is not ill-appearing or toxic-appearing.   HENT:      Head: Normocephalic.      Right Ear: Tympanic membrane, ear canal and external ear normal.      Left Ear: External ear normal. A middle ear effusion is present.      Nose: Congestion present.      Left Sinus: Maxillary sinus tenderness and frontal sinus tenderness present.      Mouth/Throat:      Lips: Pink.      Mouth: Mucous membranes are moist.      Pharynx: Oropharynx is clear. No pharyngeal swelling, oropharyngeal exudate, posterior oropharyngeal erythema or uvula swelling.      Tonsils: No tonsillar exudate.   Cardiovascular:      Rate and Rhythm: Normal rate and regular rhythm.      Heart sounds: Normal heart sounds.   Pulmonary:      Effort: Pulmonary effort is normal. No respiratory distress.      Breath sounds: Normal breath sounds.   Abdominal:      General: Bowel sounds are normal. There is no  "distension.      Palpations: Abdomen is soft.   Lymphadenopathy:      Cervical: No cervical adenopathy.   Skin:     General: Skin is warm and dry.      Coloration: Skin is not pale.   Neurological:      Mental Status: She is alert and oriented to person, place, and time.   Psychiatric:         Mood and Affect: Mood normal.         Behavior: Behavior normal.         Thought Content: Thought content normal.         Judgment: Judgment normal.         Vital Signs:   /70 (BP Location: Right arm, Patient Position: Sitting, Cuff Size: Large Adult)   Pulse 86   Temp 96.9 °F (36.1 °C) (Temporal)   Ht 149.9 cm (59\")   Wt 95.1 kg (209 lb 9.6 oz)   SpO2 98%   BMI 42.33 kg/m²  RR; 17            Assessment and Plan   Diagnoses and all orders for this visit:    1. Viral URI with cough (Primary)  -     COVID-19 and FLU A/B PCR - Swab, Nasopharynx; Future  -     POCT rapid strep A  -     loratadine-pseudoephedrine (Claritin-D 12 Hour) 5-120 MG per 12 hr tablet; Take 1 tablet by mouth 2 (Two) Times a Day for 10 days.  Dispense: 20 tablet; Refill: 0  -     fluticasone (Flonase) 50 MCG/ACT nasal spray; 2 sprays into the nostril(s) as directed by provider Daily for 30 days.  Dispense: 15.8 mL; Refill: 0  Regimen as above.  Cough and deep breathe.  Increase hydration and humidification.    2. Acute non-recurrent sinusitis of other sinus  -     loratadine-pseudoephedrine (Claritin-D 12 Hour) 5-120 MG per 12 hr tablet; Take 1 tablet by mouth 2 (Two) Times a Day for 10 days.  Dispense: 20 tablet; Refill: 0  -     fluticasone (Flonase) 50 MCG/ACT nasal spray; 2 sprays into the nostril(s) as directed by provider Daily for 30 days.  Dispense: 15.8 mL; Refill: 0  Regimen as above.    3. Smoker  Stop smoking.  Declines at this time.    4.  Allergic rhinitis  Zyrtec refilled.  Do not take Zyrtec while taking Claritin-D.  Can resume Zyrtec when Claritin-D is discontinued.    Discussed possible differential diagnoses, testing, " treatment, recommended non-pharmacological interventions, risks, warning signs to monitor for that would indicate need for follow-up in clinic or ER. If no improvement with these regimens or you have new or worsening symptoms follow-up. Patient verbalizes understanding and agreement with plan of care. Denies further needs or concerns.     I spent 20 minutes caring for patient on this date of service. This time includes time spent by me in the following activities: preparing for the visit, reviewing tests, obtaining and/or reviewing a separately obtained history, performing a medically appropriate examination and/or evaluation, counseling and educating the patient/family/caregiver, ordering medications, tests, or procedures and documenting information in the medical record    Meds ordered during this visit:  New Medications Ordered This Visit   Medications   • loratadine-pseudoephedrine (Claritin-D 12 Hour) 5-120 MG per 12 hr tablet     Sig: Take 1 tablet by mouth 2 (Two) Times a Day for 10 days.     Dispense:  20 tablet     Refill:  0   • fluticasone (Flonase) 50 MCG/ACT nasal spray     Si sprays into the nostril(s) as directed by provider Daily for 30 days.     Dispense:  15.8 mL     Refill:  0   • cetirizine (zyrTEC) 10 MG tablet     Sig: Take 1 tablet by mouth Daily for 90 days.     Dispense:  30 tablet     Refill:  2       Patient Instructions:  Patient instructions given for the following visit diagnosis:    ICD-10-CM ICD-9-CM   1. Viral URI with cough  J06.9 465.9   2. Acute non-recurrent sinusitis of other sinus  J01.80 461.8   3. Smoker  F17.200 305.1   4. Allergic rhinitis, unspecified seasonality, unspecified trigger  J30.9 477.9       Follow Up   Return if symptoms worsen or fail to improve.        This document has been electronically signed by DALILA Juan  2022 15:03 EST    Patient was given instructions and counseling regarding her condition or for health maintenance advice.  Please see specific information pulled into the AVS if appropriate.     Part of this note may be an electronic transcription/translation of spoken language to printed text using the Dragon Dictation System.

## 2022-02-10 ENCOUNTER — TELEPHONE (OUTPATIENT)
Dept: FAMILY MEDICINE CLINIC | Facility: CLINIC | Age: 32
End: 2022-02-10

## 2022-02-10 NOTE — TELEPHONE ENCOUNTER
----- Message from DALILA Juan sent at 2/10/2022  8:16 AM EST -----  Please call the patient regarding her abnormal result.    Negative for strep, flu, and Covid      Left a message to return call.    Patient notified.

## 2022-02-17 ENCOUNTER — OFFICE VISIT (OUTPATIENT)
Dept: FAMILY MEDICINE CLINIC | Facility: CLINIC | Age: 32
End: 2022-02-17

## 2022-02-17 VITALS
OXYGEN SATURATION: 98 % | SYSTOLIC BLOOD PRESSURE: 110 MMHG | HEIGHT: 59 IN | DIASTOLIC BLOOD PRESSURE: 70 MMHG | TEMPERATURE: 96.8 F | HEART RATE: 81 BPM | BODY MASS INDEX: 42.66 KG/M2 | WEIGHT: 211.6 LBS

## 2022-02-17 DIAGNOSIS — F33.1 MAJOR DEPRESSIVE DISORDER, RECURRENT, MODERATE: Primary | ICD-10-CM

## 2022-02-17 DIAGNOSIS — F41.1 GENERALIZED ANXIETY DISORDER: ICD-10-CM

## 2022-02-17 PROCEDURE — 99214 OFFICE O/P EST MOD 30 MIN: CPT | Performed by: NURSE PRACTITIONER

## 2022-02-17 RX ORDER — VILAZODONE HYDROCHLORIDE 10 MG-20MG
KIT ORAL
Qty: 2 KIT | Refills: 0 | Status: CANCELLED | OUTPATIENT
Start: 2022-02-17 | End: 2022-02-24

## 2022-02-17 NOTE — PROGRESS NOTES
Chief Complaint  Anxiety    Subjective          Enriqueta Gotti is a 31 y.o. female who presents today to [facility] for initial evaluation     HPI:   History of Present Illness    Presents to clinic for c/o anxiety and depression.  Reports she has been getting more irritable and cranky.  Has been stressed and overwhelmed as has had a lot of things going on.  Reports her mother is sick and getting dialysis.  Has two sons, one of which has ADHD and autism.  Reports her  works night shift and does not want to help out.  Is feeling stressed and overwhelmed.  Reports history of anxiety and depression previously treated with Viibryd and BuSpar.  Reports she did well on these regimens but with having to care for her family, she has not kept her own appointments.  Would like to restart Viibryd.  Does not want to start BuSpar at this time as it caused drowsiness.  Reports she is done outpatient therapy at [facility] about 4 years ago.  Reports she was hospitalized for depression and suicidal ideation about 4 years ago.  Denies any current or recent thoughts of self-harm, suicide, homicide.  Associated symptoms: Having issues falling and staying asleep.      The following portions of the patient's history were reviewed and updated as appropriate: allergies, current medications, past family history, past medical history, past social history, past surgical history and problem list.    Objective     Problem List:  Patient Active Problem List   Diagnosis   • Migraine headache   • MAURICE (obstructive sleep apnea)   • Syncope   • Anxiety   • Bipolar disorder (HCC)       Allergy:   Allergies   Allergen Reactions   • Codeine    • Penicillins    • Sulfa Antibiotics         Discontinued Medications:  Medications Discontinued During This Encounter   Medication Reason   • loratadine-pseudoephedrine (Claritin-D 12 Hour) 5-120 MG per 12 hr tablet Historical Med - Therapy completed        Current Medications:   Current Outpatient Medications   Medication Sig Dispense Refill   • cetirizine (zyrTEC) 10 MG tablet Take 1 tablet by mouth Daily for 90 days. 30 tablet 2   • fluticasone (Flonase) 50 MCG/ACT nasal spray 2 sprays into the nostril(s) as directed by provider Daily for 30 days. 15.8 mL 0   • Vilazodone HCl 10 & 20 MG kit Take 10 mg by mouth Daily for 7 days, THEN 20 mg Daily for 23 days. 1 kit 0     No current facility-administered medications for this visit.       Past Medical History:  Past Medical History:   Diagnosis Date   • Ankle sprain     right   • Anxiety    • Bipolar disorder (HCC)    • Carpal tunnel syndrome    • Chronic pain disorder     Fibroymyalgia   • Tear of meniscus of knee     left        Past Surgical History:  Past Surgical History:   Procedure Laterality Date   • CARPAL TUNNEL RELEASE     •  SECTION     • CHOLECYSTECTOMY     • LAPAROSCOPIC TUBAL LIGATION     • TONSILLECTOMY AND ADENOIDECTOMY     • TUBAL ABDOMINAL LIGATION         Social History:  Social History     Socioeconomic History   • Marital status:    Tobacco Use   • Smoking status: Current Every Day Smoker     Packs/day: 0.50     Years: 1.00     Pack years: 0.50     Types: Cigarettes     Start date: 2016   • Smokeless tobacco: Never Used   • Tobacco comment: Trying to quit.   Vaping Use   • Vaping Use: Never used   Substance and Sexual Activity   • Alcohol use: Yes     Comment: rarely   • Drug use: No   • Sexual activity: Defer       Family History:  Family History   Problem Relation Age of Onset   • Osteoarthritis Other    • Osteoporosis Other    • Heart disease Other    • Hypertension Other    • Asthma Other    • Diabetes Other    • Kidney disease Other    • Stroke Other        Review of Systems:  Review of Systems   Psychiatric/Behavioral: Negative for self-injury and suicidal ideas. The patient is nervous/anxious.        Physical Exam:  Physical Exam  Vitals and nursing note reviewed.    Constitutional:       General: She is not in acute distress.     Appearance: Normal appearance. She is not ill-appearing or toxic-appearing.   HENT:      Head: Normocephalic.   Eyes:      General:         Left eye: No discharge.   Neck:      Thyroid: No thyromegaly.   Cardiovascular:      Rate and Rhythm: Normal rate and regular rhythm.      Heart sounds: Normal heart sounds.   Pulmonary:      Effort: Pulmonary effort is normal. No respiratory distress.      Breath sounds: Normal breath sounds.   Abdominal:      General: Bowel sounds are normal.      Palpations: Abdomen is soft.   Skin:     General: Skin is warm and dry.      Coloration: Skin is not pale.   Neurological:      Mental Status: She is alert and oriented to person, place, and time.   Psychiatric:         Mood and Affect: Mood normal. Affect is tearful.         Behavior: Behavior normal.         Thought Content: Thought content normal.         Judgment: Judgment normal.          PHQ-9 Depression Screening  Little interest or pleasure in doing things? 0   Feeling down, depressed, or hopeless? 0   Trouble falling or staying asleep, or sleeping too much?     Feeling tired or having little energy?     Poor appetite or overeating?     Feeling bad about yourself - or that you are a failure or have let yourself or your family down?     Trouble concentrating on things, such as reading the newspaper or watching television?     Moving or speaking so slowly that other people could have noticed? Or the opposite - being so fidgety or restless that you have been moving around a lot more than usual?     Thoughts that you would be better off dead, or of hurting yourself in some way?     PHQ-9 Total Score 0   If you checked off any problems, how difficult have these problems made it for you to do your work, take care of things at home, or get along with other people?         Vital Signs:   /70 (BP Location: Left arm, Patient Position: Sitting, Cuff Size: Large  "Adult)   Pulse 81   Temp 96.8 °F (36 °C) (Temporal)   Ht 149.9 cm (59\")   Wt 96 kg (211 lb 9.6 oz)   SpO2 98%   BMI 42.74 kg/m²  RR: 17            Assessment and Plan   Diagnoses and all orders for this visit:    1. Major depressive disorder, recurrent, moderate (HCC) (Primary)  -     CBC & Differential; Future  -     Comprehensive Metabolic Panel; Future  -     TSH Rfx On Abnormal To Free T4; Future  -     Vitamin D 25 Hydroxy; Future  -     Vitamin B12; Future  -     Vilazodone HCl 10 & 20 MG kit; Take 10 mg by mouth Daily for 7 days, THEN 20 mg Daily for 23 days.  Dispense: 1 kit; Refill: 0  Initiate regimen as above.  Agreeable to labs today.  Agreeable to CBT, referral placed.    2. Generalized anxiety disorder  -     CBC & Differential; Future  -     Comprehensive Metabolic Panel; Future  -     TSH Rfx On Abnormal To Free T4; Future  -     Vitamin D 25 Hydroxy; Future  -     Vitamin B12; Future  -     Vilazodone HCl 10 & 20 MG kit; Take 10 mg by mouth Daily for 7 days, THEN 20 mg Daily for 23 days.  Dispense: 1 kit; Refill: 0  As above with #1.    Discussed possible differential diagnoses, testing, treatment, recommended non-pharmacological interventions, risks, warning signs to monitor for that would indicate need for follow-up in clinic or ER. If no improvement with these regimens or you have new or worsening symptoms follow-up. Patient verbalizes understanding and agreement with plan of care. Denies further needs or concerns.     I spent 30 minutes caring for patient on this date of service. This time includes time spent by me in the following activities: preparing for the visit, reviewing tests, obtaining and/or reviewing a separately obtained history, performing a medically appropriate examination and/or evaluation, counseling and educating the patient/family/caregiver, ordering medications, tests, or procedures and documenting information in the medical record    Meds ordered during this " visit:  New Medications Ordered This Visit   Medications   • Vilazodone HCl 10 & 20 MG kit     Sig: Take 10 mg by mouth Daily for 7 days, THEN 20 mg Daily for 23 days.     Dispense:  1 kit     Refill:  0       Patient Instructions:  Patient instructions given for the following visit diagnosis:    ICD-10-CM ICD-9-CM   1. Major depressive disorder, recurrent, moderate (HCC)  F33.1 296.32   2. Generalized anxiety disorder  F41.1 300.02       Follow Up   Return in about 2 weeks (around 3/3/2022) for Recheck. Sooner if needed       This document has been electronically signed by DALILA Juan  February 17, 2022 11:01 EST    Patient was given instructions and counseling regarding her condition or for health maintenance advice. Please see specific information pulled into the AVS if appropriate.     Part of this note may be an electronic transcription/translation of spoken language to printed text using the Dragon Dictation System.

## 2022-02-21 ENCOUNTER — LAB (OUTPATIENT)
Dept: FAMILY MEDICINE CLINIC | Facility: CLINIC | Age: 32
End: 2022-02-21

## 2022-02-21 DIAGNOSIS — F41.1 GENERALIZED ANXIETY DISORDER: ICD-10-CM

## 2022-02-21 DIAGNOSIS — F33.1 MAJOR DEPRESSIVE DISORDER, RECURRENT, MODERATE: ICD-10-CM

## 2022-02-21 PROCEDURE — 82607 VITAMIN B-12: CPT | Performed by: NURSE PRACTITIONER

## 2022-02-21 PROCEDURE — 80050 GENERAL HEALTH PANEL: CPT | Performed by: NURSE PRACTITIONER

## 2022-02-21 PROCEDURE — 82306 VITAMIN D 25 HYDROXY: CPT | Performed by: NURSE PRACTITIONER

## 2022-02-21 PROCEDURE — 36415 COLL VENOUS BLD VENIPUNCTURE: CPT

## 2022-02-22 ENCOUNTER — TELEPHONE (OUTPATIENT)
Dept: FAMILY MEDICINE CLINIC | Facility: CLINIC | Age: 32
End: 2022-02-22

## 2022-02-22 LAB
25(OH)D3 SERPL-MCNC: 16.6 NG/ML
ALBUMIN SERPL-MCNC: 4.6 G/DL (ref 3.5–5.2)
ALBUMIN/GLOB SERPL: 1.5 G/DL
ALP SERPL-CCNC: 68 U/L (ref 39–117)
ALT SERPL W P-5'-P-CCNC: 31 U/L (ref 1–33)
ANION GAP SERPL CALCULATED.3IONS-SCNC: 10 MMOL/L (ref 5–15)
AST SERPL-CCNC: 23 U/L (ref 1–32)
BASOPHILS # BLD AUTO: 0.06 10*3/MM3 (ref 0–0.2)
BASOPHILS NFR BLD AUTO: 0.5 % (ref 0–1.5)
BILIRUB SERPL-MCNC: 0.2 MG/DL (ref 0–1.2)
BUN SERPL-MCNC: 10 MG/DL (ref 6–20)
BUN/CREAT SERPL: 13 (ref 7–25)
CALCIUM SPEC-SCNC: 9.6 MG/DL (ref 8.6–10.5)
CHLORIDE SERPL-SCNC: 107 MMOL/L (ref 98–107)
CO2 SERPL-SCNC: 23 MMOL/L (ref 22–29)
CREAT SERPL-MCNC: 0.77 MG/DL (ref 0.57–1)
DEPRECATED RDW RBC AUTO: 40 FL (ref 37–54)
EOSINOPHIL # BLD AUTO: 0.28 10*3/MM3 (ref 0–0.4)
EOSINOPHIL NFR BLD AUTO: 2.1 % (ref 0.3–6.2)
ERYTHROCYTE [DISTWIDTH] IN BLOOD BY AUTOMATED COUNT: 12.8 % (ref 12.3–15.4)
GFR SERPL CREATININE-BSD FRML MDRD: 86 ML/MIN/1.73
GLOBULIN UR ELPH-MCNC: 3 GM/DL
GLUCOSE SERPL-MCNC: 84 MG/DL (ref 65–99)
HCT VFR BLD AUTO: 43.9 % (ref 34–46.6)
HGB BLD-MCNC: 14.7 G/DL (ref 12–15.9)
IMM GRANULOCYTES # BLD AUTO: 0.07 10*3/MM3 (ref 0–0.05)
IMM GRANULOCYTES NFR BLD AUTO: 0.5 % (ref 0–0.5)
LYMPHOCYTES # BLD AUTO: 4 10*3/MM3 (ref 0.7–3.1)
LYMPHOCYTES NFR BLD AUTO: 30.2 % (ref 19.6–45.3)
MCH RBC QN AUTO: 29.1 PG (ref 26.6–33)
MCHC RBC AUTO-ENTMCNC: 33.5 G/DL (ref 31.5–35.7)
MCV RBC AUTO: 86.8 FL (ref 79–97)
MONOCYTES # BLD AUTO: 1.01 10*3/MM3 (ref 0.1–0.9)
MONOCYTES NFR BLD AUTO: 7.6 % (ref 5–12)
NEUTROPHILS NFR BLD AUTO: 59.1 % (ref 42.7–76)
NEUTROPHILS NFR BLD AUTO: 7.81 10*3/MM3 (ref 1.7–7)
NRBC BLD AUTO-RTO: 0 /100 WBC (ref 0–0.2)
PLATELET # BLD AUTO: 286 10*3/MM3 (ref 140–450)
PMV BLD AUTO: 11.2 FL (ref 6–12)
POTASSIUM SERPL-SCNC: 4.7 MMOL/L (ref 3.5–5.2)
PROT SERPL-MCNC: 7.6 G/DL (ref 6–8.5)
RBC # BLD AUTO: 5.06 10*6/MM3 (ref 3.77–5.28)
SODIUM SERPL-SCNC: 140 MMOL/L (ref 136–145)
TSH SERPL DL<=0.05 MIU/L-ACNC: 3.03 UIU/ML (ref 0.27–4.2)
VIT B12 BLD-MCNC: 373 PG/ML (ref 211–946)
WBC NRBC COR # BLD: 13.23 10*3/MM3 (ref 3.4–10.8)

## 2022-02-22 NOTE — TELEPHONE ENCOUNTER
Patient called asking about her PA and labwork. Patient informed of her labs and verbalized understanding. Patient is going to call back with her insurance ID.

## 2022-02-22 NOTE — TELEPHONE ENCOUNTER
----- Message from DALILA Juan sent at 2/22/2022  8:15 AM EST -----  Please call the patient regarding her abnormal result.    White blood cells are high.  Have her follow-up if feeling ill.  Recommend recheck in the future.

## 2022-02-22 NOTE — TELEPHONE ENCOUNTER
PA request received from [facility] on Viibryd 10 & 20MG kit. Tried to call patient to see if patient was able to pick medication up. I wasn't able to reach patient on either number listed.     Spoke with pharmacy and was told they could split the prescription up by writing the 10mg for 7 days and then giving the 20mg for the remaining 23 days. No PA needed at this time.     Pharmacy called back and stated PA still required. No insurance card in the chart to go by. Tried to call patient. No answer. Left message to return my call. Tried to submit PA by looking up Medicaid ID. PA wasn't able to be processed without correct insurance information.

## 2022-02-28 ENCOUNTER — TELEMEDICINE (OUTPATIENT)
Dept: FAMILY MEDICINE CLINIC | Facility: CLINIC | Age: 32
End: 2022-02-28

## 2022-02-28 DIAGNOSIS — R11.0 NAUSEA: ICD-10-CM

## 2022-02-28 DIAGNOSIS — R05.9 COUGH: Primary | ICD-10-CM

## 2022-02-28 PROCEDURE — 87636 SARSCOV2 & INF A&B AMP PRB: CPT | Performed by: FAMILY MEDICINE

## 2022-02-28 PROCEDURE — 99213 OFFICE O/P EST LOW 20 MIN: CPT | Performed by: FAMILY MEDICINE

## 2022-02-28 RX ORDER — ONDANSETRON 4 MG/1
4 TABLET, ORALLY DISINTEGRATING ORAL EVERY 8 HOURS PRN
Qty: 15 TABLET | Refills: 0 | Status: SHIPPED | OUTPATIENT
Start: 2022-02-28 | End: 2022-05-02

## 2022-02-28 RX ORDER — GUAIFENESIN 600 MG/1
1200 TABLET, EXTENDED RELEASE ORAL 2 TIMES DAILY
Qty: 40 TABLET | Refills: 0 | Status: SHIPPED | OUTPATIENT
Start: 2022-02-28 | End: 2022-03-10

## 2022-03-01 ENCOUNTER — TELEPHONE (OUTPATIENT)
Dept: FAMILY MEDICINE CLINIC | Facility: CLINIC | Age: 32
End: 2022-03-01

## 2022-03-01 NOTE — TELEPHONE ENCOUNTER
----- Message from DALILA Torres sent at 3/1/2022  8:29 AM EST -----  Please let patient know results are normal. Thank you.       Patient notified.

## 2022-04-11 ENCOUNTER — OFFICE VISIT (OUTPATIENT)
Dept: FAMILY MEDICINE CLINIC | Facility: CLINIC | Age: 32
End: 2022-04-11

## 2022-04-11 VITALS
SYSTOLIC BLOOD PRESSURE: 122 MMHG | WEIGHT: 208.4 LBS | BODY MASS INDEX: 42.01 KG/M2 | HEART RATE: 96 BPM | HEIGHT: 59 IN | DIASTOLIC BLOOD PRESSURE: 70 MMHG | TEMPERATURE: 96.8 F | OXYGEN SATURATION: 98 %

## 2022-04-11 DIAGNOSIS — F41.9 ANXIETY: ICD-10-CM

## 2022-04-11 DIAGNOSIS — J06.9 VIRAL UPPER RESPIRATORY TRACT INFECTION: Primary | ICD-10-CM

## 2022-04-11 PROCEDURE — 99214 OFFICE O/P EST MOD 30 MIN: CPT | Performed by: FAMILY MEDICINE

## 2022-04-11 RX ORDER — BUSPIRONE HYDROCHLORIDE 5 MG/1
5 TABLET ORAL 3 TIMES DAILY
Qty: 90 TABLET | Refills: 1 | Status: SHIPPED | OUTPATIENT
Start: 2022-04-11 | End: 2022-05-02 | Stop reason: SDUPTHER

## 2022-04-11 RX ORDER — LORATADINE AND PSEUDOEPHEDRINE SULFATE 10; 240 MG/1; MG/1
1 TABLET, EXTENDED RELEASE ORAL DAILY
Qty: 14 TABLET | Refills: 0 | Status: SHIPPED | OUTPATIENT
Start: 2022-04-11 | End: 2022-05-02

## 2022-04-11 NOTE — PROGRESS NOTES
"Chief Complaint  Sinusitis    Subjective          Enriqueta Gotti presents to [facility]  URI   This is a new problem. The current episode started yesterday. The problem has been unchanged. There has been no fever. Associated symptoms include congestion, coughing, rhinorrhea, sinus pain, sneezing and a sore throat. She has tried antihistamine for the symptoms. The treatment provided mild relief.   Anxiety  Presents for follow-up visit. Symptoms include nervous/anxious behavior and restlessness. Primary symptoms comment: States she was unable to get the vibyrd due to it needing a pa. States she did do well with buspar. States her mother recently passed away, and is having increased symptoms.. Symptoms occur most days. The severity of symptoms is moderate.       Will have staff check on pa of viibryd. Will refill buspar and have patient reschedule her appointment with [facility].     Review of Systems   HENT: Positive for congestion, rhinorrhea, sinus pain, sneezing and sore throat.    Respiratory: Positive for cough.    Psychiatric/Behavioral: The patient is nervous/anxious.          Objective   Vital Signs:   /70 (BP Location: Right arm, Patient Position: Sitting, Cuff Size: Large Adult)   Pulse 96   Temp 96.8 °F (36 °C) (Temporal)   Ht 149.9 cm (59\")   Wt 94.5 kg (208 lb 6.4 oz)   SpO2 98%   BMI 42.09 kg/m²     Physical Exam  Constitutional:       General: She is not in acute distress.     Appearance: Normal appearance. She is well-developed and well-groomed. She is not ill-appearing, toxic-appearing or diaphoretic.   HENT:      Head: Normocephalic.      Nose: Nose normal. No congestion or rhinorrhea.      Mouth/Throat:      Mouth: Mucous membranes are moist.      Pharynx: Oropharynx is clear. No oropharyngeal exudate or posterior oropharyngeal erythema.   Eyes:      General: Lids are normal.         Right eye: No discharge.         Left eye: No discharge.      " Extraocular Movements: Extraocular movements intact.      Pupils: Pupils are equal, round, and reactive to light.   Neck:      Vascular: No carotid bruit.   Cardiovascular:      Rate and Rhythm: Normal rate and regular rhythm.      Pulses: Normal pulses.      Heart sounds: Normal heart sounds. No murmur heard.    No friction rub. No gallop.   Pulmonary:      Effort: Pulmonary effort is normal. No respiratory distress.      Breath sounds: Normal breath sounds. No stridor. No wheezing, rhonchi or rales.   Chest:      Chest wall: No tenderness.   Abdominal:      General: Bowel sounds are normal. There is no distension.      Palpations: Abdomen is soft. There is no mass.      Tenderness: There is no abdominal tenderness. There is no right CVA tenderness, left CVA tenderness, guarding or rebound.      Hernia: No hernia is present.   Musculoskeletal:         General: No swelling or tenderness. Normal range of motion.      Cervical back: Normal range of motion and neck supple. No rigidity or tenderness.      Right lower leg: No edema.      Left lower leg: No edema.   Lymphadenopathy:      Cervical: No cervical adenopathy.   Skin:     General: Skin is warm.      Capillary Refill: Capillary refill takes less than 2 seconds.      Coloration: Skin is not jaundiced.      Findings: No bruising, erythema or rash.   Neurological:      General: No focal deficit present.      Mental Status: She is alert and oriented to person, place, and time.      Motor: Motor function is intact. No weakness.      Coordination: Coordination is intact.      Gait: Gait is intact. Gait normal.   Psychiatric:         Attention and Perception: Attention normal.         Mood and Affect: Mood normal.         Speech: Speech normal.         Behavior: Behavior normal.         Cognition and Memory: Cognition normal.         Judgment: Judgment normal.        Result Review :                 Assessment and Plan    Diagnoses and all orders for this visit:    1.  Viral upper respiratory tract infection (Primary)  -     loratadine-pseudoephedrine (Claritin-D 24 Hour)  MG per 24 hr tablet; Take 1 tablet by mouth Daily for 14 days.  Dispense: 14 tablet; Refill: 0    2. Anxiety  -     busPIRone (BUSPAR) 5 MG tablet; Take 1 tablet by mouth 3 (Three) Times a Day for 30 days.  Dispense: 90 tablet; Refill: 1      Patient's Body mass index is 42.09 kg/m². indicating that she is morbidly obese (BMI > 40 or > 35 with obesity - related health condition). Obesity-related health conditions include the following: none. Obesity is unchanged. BMI is is above average; BMI management plan is completed. We discussed low calorie, low carb based diet program, portion control and increasing exercise..    Follow Up   Return if symptoms worsen or fail to improve.  Patient was given instructions and counseling regarding her condition or for health maintenance advice. Please see specific information pulled into the AVS if appropriate.

## 2022-04-30 ENCOUNTER — HOSPITAL ENCOUNTER (EMERGENCY)
Facility: HOSPITAL | Age: 32
Discharge: HOME | End: 2022-04-30
Attending: EMERGENCY MEDICINE | Admitting: EMERGENCY MEDICINE

## 2022-04-30 VITALS
RESPIRATION RATE: 18 BRPM | HEART RATE: 80 BPM | DIASTOLIC BLOOD PRESSURE: 76 MMHG | WEIGHT: 206 LBS | TEMPERATURE: 97.1 F | HEIGHT: 59 IN | SYSTOLIC BLOOD PRESSURE: 99 MMHG | OXYGEN SATURATION: 99 % | BODY MASS INDEX: 41.53 KG/M2

## 2022-04-30 DIAGNOSIS — G43.909 MIGRAINE WITHOUT STATUS MIGRAINOSUS, NOT INTRACTABLE, UNSPECIFIED MIGRAINE TYPE: Primary | ICD-10-CM

## 2022-04-30 PROCEDURE — 99283 EMERGENCY DEPT VISIT LOW MDM: CPT

## 2022-04-30 PROCEDURE — 96372 THER/PROPH/DIAG INJ SC/IM: CPT

## 2022-04-30 PROCEDURE — 63710000001 ONDANSETRON ODT 4 MG TABLET DISPERSIBLE: Performed by: EMERGENCY MEDICINE

## 2022-04-30 PROCEDURE — 25010000002 DEXAMETHASONE PER 1 MG: Performed by: NURSE PRACTITIONER

## 2022-04-30 PROCEDURE — 25010000002 HYDROMORPHONE 1 MG/ML SOLUTION: Performed by: NURSE PRACTITIONER

## 2022-04-30 RX ORDER — ONDANSETRON 4 MG/1
4 TABLET, ORALLY DISINTEGRATING ORAL ONCE
Status: COMPLETED | OUTPATIENT
Start: 2022-04-30 | End: 2022-04-30

## 2022-04-30 RX ORDER — DEXAMETHASONE SODIUM PHOSPHATE 4 MG/ML
8 INJECTION, SOLUTION INTRA-ARTICULAR; INTRALESIONAL; INTRAMUSCULAR; INTRAVENOUS; SOFT TISSUE ONCE
Status: COMPLETED | OUTPATIENT
Start: 2022-04-30 | End: 2022-04-30

## 2022-04-30 RX ADMIN — DEXAMETHASONE SODIUM PHOSPHATE 8 MG: 4 INJECTION, SOLUTION INTRA-ARTICULAR; INTRALESIONAL; INTRAMUSCULAR; INTRAVENOUS; SOFT TISSUE at 04:02

## 2022-04-30 RX ADMIN — HYDROMORPHONE HYDROCHLORIDE 1 MG: 1 INJECTION, SOLUTION INTRAMUSCULAR; INTRAVENOUS; SUBCUTANEOUS at 04:02

## 2022-04-30 RX ADMIN — ONDANSETRON 4 MG: 4 TABLET, ORALLY DISINTEGRATING ORAL at 04:05

## 2022-05-02 ENCOUNTER — TELEPHONE (OUTPATIENT)
Dept: FAMILY MEDICINE CLINIC | Facility: CLINIC | Age: 32
End: 2022-05-02

## 2022-05-02 ENCOUNTER — OFFICE VISIT (OUTPATIENT)
Dept: PSYCHIATRY | Facility: CLINIC | Age: 32
End: 2022-05-02

## 2022-05-02 VITALS
HEART RATE: 83 BPM | WEIGHT: 209.4 LBS | BODY MASS INDEX: 42.21 KG/M2 | DIASTOLIC BLOOD PRESSURE: 85 MMHG | TEMPERATURE: 98 F | OXYGEN SATURATION: 98 % | HEIGHT: 59 IN | SYSTOLIC BLOOD PRESSURE: 134 MMHG

## 2022-05-02 DIAGNOSIS — F33.1 MAJOR DEPRESSIVE DISORDER, RECURRENT, MODERATE: Primary | ICD-10-CM

## 2022-05-02 DIAGNOSIS — F41.1 GENERALIZED ANXIETY DISORDER: ICD-10-CM

## 2022-05-02 PROCEDURE — 90792 PSYCH DIAG EVAL W/MED SRVCS: CPT

## 2022-05-02 RX ORDER — BUSPIRONE HYDROCHLORIDE 5 MG/1
5 TABLET ORAL 2 TIMES DAILY
Qty: 60 TABLET | Refills: 0 | Status: SHIPPED | OUTPATIENT
Start: 2022-05-02 | End: 2022-06-02 | Stop reason: SDUPTHER
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
## 2022-05-18 ENCOUNTER — OFFICE VISIT (OUTPATIENT)
Dept: FAMILY MEDICINE CLINIC | Facility: CLINIC | Age: 32
End: 2022-05-18

## 2022-05-18 VITALS
BODY MASS INDEX: 40.99 KG/M2 | HEART RATE: 85 BPM | TEMPERATURE: 97.1 F | DIASTOLIC BLOOD PRESSURE: 62 MMHG | OXYGEN SATURATION: 99 % | WEIGHT: 208.8 LBS | HEIGHT: 60 IN | SYSTOLIC BLOOD PRESSURE: 118 MMHG | RESPIRATION RATE: 16 BRPM

## 2022-05-18 DIAGNOSIS — M25.511 ACUTE PAIN OF RIGHT SHOULDER: Primary | ICD-10-CM

## 2022-05-18 DIAGNOSIS — J30.9 ALLERGIC RHINITIS, UNSPECIFIED SEASONALITY, UNSPECIFIED TRIGGER: ICD-10-CM

## 2022-05-18 PROCEDURE — 99214 OFFICE O/P EST MOD 30 MIN: CPT | Performed by: NURSE PRACTITIONER

## 2022-05-18 RX ORDER — CETIRIZINE HYDROCHLORIDE 10 MG/1
10 TABLET ORAL DAILY
Qty: 30 TABLET | Refills: 2 | Status: SHIPPED | OUTPATIENT
Start: 2022-05-18 | End: 2022-07-18

## 2022-05-18 RX ORDER — NAPROXEN 500 MG/1
500 TABLET ORAL 2 TIMES DAILY WITH MEALS
Qty: 28 TABLET | Refills: 0 | Status: SHIPPED | OUTPATIENT
Start: 2022-05-18 | End: 2022-06-01

## 2022-05-18 NOTE — PROGRESS NOTES
Chief Complaint  Shoulder Pain (Right x 3 weeks)    Subjective          Enriqueta Gotti is a 31 y.o. female who presents today to [facility] for initial evaluation     HPI:   History of Present Illness    Presents to clinic for c/o right shoulder pain for a few weeks. Seen at [facility] initially. Treatments: cyclobenzaprine helped some but doesn't like to take due to drowsiness. Behind shoulder blade. Tender and sore. Denies injury.       The following portions of the patient's history were reviewed and updated as appropriate: allergies, current medications, past family history, past medical history, past social history, past surgical history and problem list.    Objective     Problem List:  Patient Active Problem List   Diagnosis   • Migraine headache   • MAURICE (obstructive sleep apnea)   • Syncope   • Anxiety   • Bipolar disorder (HCC)       Allergy:   Allergies   Allergen Reactions   • Codeine    • Penicillins    • Sulfa Antibiotics         Discontinued Medications:  Medications Discontinued During This Encounter   Medication Reason   • cetirizine (zyrTEC) 10 MG tablet Reorder       Current Medications:   Current Outpatient Medications   Medication Sig Dispense Refill   • busPIRone (BUSPAR) 5 MG tablet Take 1 tablet by mouth 2 (Two) Times a Day for 30 days. 60 tablet 0   • cetirizine (zyrTEC) 10 MG tablet Take 1 tablet by mouth Daily for 90 days. 30 tablet 2   • Vilazodone HCl 10 & 20 MG kit Take 10 mg by mouth Daily for 7 days, THEN 20 mg Daily for 23 days. 14 kit 0   • naproxen (Naprosyn) 500 MG tablet Take 1 tablet by mouth 2 (Two) Times a Day With Meals for 14 days. 28 tablet 0     No current facility-administered medications for this visit.       Past Medical History:  Past Medical History:   Diagnosis Date   • Ankle sprain     right   • Anxiety    • Bipolar disorder (HCC)    • Carpal tunnel syndrome    • Chronic pain disorder     Fibroymyalgia   • Depression    •  Suicide attempt (HCC)    • Tear of meniscus of knee     left        Past Surgical History:  Past Surgical History:   Procedure Laterality Date   • CARPAL TUNNEL RELEASE     •  SECTION     • CHOLECYSTECTOMY     • LAPAROSCOPIC TUBAL LIGATION     • TONSILLECTOMY AND ADENOIDECTOMY     • TUBAL ABDOMINAL LIGATION         Social History:  Social History     Socioeconomic History   • Marital status:    Tobacco Use   • Smoking status: Current Every Day Smoker     Packs/day: 1.00     Years: 3.00     Pack years: 3.00     Types: Cigarettes     Start date: 2016   • Smokeless tobacco: Never Used   • Tobacco comment: Trying to quit.   Vaping Use   • Vaping Use: Never used   Substance and Sexual Activity   • Alcohol use: Yes     Comment: rarely   • Drug use: No   • Sexual activity: Defer       Family History:  Family History   Problem Relation Age of Onset   • Anxiety disorder Mother    • Bipolar disorder Mother    • Schizophrenia Brother    • Osteoarthritis Other    • Osteoporosis Other    • Heart disease Other    • Hypertension Other    • Asthma Other    • Diabetes Other    • Kidney disease Other    • Stroke Other    • ADD / ADHD Child        Review of Systems:  Review of Systems   Neurological: Negative for weakness.       Physical Exam:  Physical Exam  Vitals and nursing note reviewed.   Constitutional:       General: She is not in acute distress.     Appearance: Normal appearance. She is not ill-appearing or toxic-appearing.   HENT:      Head: Normocephalic.   Cardiovascular:      Rate and Rhythm: Normal rate and regular rhythm.      Heart sounds: Normal heart sounds.   Pulmonary:      Effort: Pulmonary effort is normal. No respiratory distress.      Breath sounds: Normal breath sounds.   Musculoskeletal:         General: Tenderness (right trap) present.   Skin:     General: Skin is warm and dry.      Coloration: Skin is not pale.   Neurological:      Mental Status: She is alert and oriented to person,  "place, and time.   Psychiatric:         Mood and Affect: Mood normal.         Behavior: Behavior normal.         Thought Content: Thought content normal.         Judgment: Judgment normal.         Vital Signs:   /62 (BP Location: Left arm, Patient Position: Sitting, Cuff Size: Adult)   Pulse 85   Temp 97.1 °F (36.2 °C) (Temporal)   Resp 16   Ht 152.4 cm (60\")   Wt 94.7 kg (208 lb 12.8 oz)   SpO2 99%   BMI 40.78 kg/m²              Assessment and Plan   Diagnoses and all orders for this visit:    1. Acute pain of right shoulder (Primary)  -     naproxen (Naprosyn) 500 MG tablet; Take 1 tablet by mouth 2 (Two) Times a Day With Meals for 14 days.  Dispense: 28 tablet; Refill: 0  RICE therapy. Warm compresses.     2. Allergic rhinitis, unspecified seasonality, unspecified trigger  -     cetirizine (zyrTEC) 10 MG tablet; Take 1 tablet by mouth Daily for 90 days.  Dispense: 30 tablet; Refill: 2  Continue regimen as above.       Discussed possible differential diagnoses, testing, treatment, recommended non-pharmacological interventions, risks, warning signs to monitor for that would indicate need for follow-up in clinic or ER. If no improvement with these regimens or you have new or worsening symptoms follow-up. Patient verbalizes understanding and agreement with plan of care. Denies further needs or concerns.     I spent 30 minutes caring for patient on this date of service. This time includes time spent by me in the following activities: preparing for the visit, reviewing tests, obtaining and/or reviewing a separately obtained history, performing a medically appropriate examination and/or evaluation, counseling and educating the patient/family/caregiver, ordering medications, tests, or procedures and documenting information in the medical record    Meds ordered during this visit:  New Medications Ordered This Visit   Medications   • cetirizine (zyrTEC) 10 MG tablet     Sig: Take 1 tablet by mouth Daily for 90 " days.     Dispense:  30 tablet     Refill:  2   • naproxen (Naprosyn) 500 MG tablet     Sig: Take 1 tablet by mouth 2 (Two) Times a Day With Meals for 14 days.     Dispense:  28 tablet     Refill:  0       Patient Instructions:  Patient instructions given for the following visit diagnosis:    ICD-10-CM ICD-9-CM   1. Acute pain of right shoulder  M25.511 719.41   2. Allergic rhinitis, unspecified seasonality, unspecified trigger  J30.9 477.9       Follow Up   Return in about 2 weeks (around 6/1/2022).  Sooner if needed       This document has been electronically signed by DALILA Juan  May 18, 2022 09:34 EDT    Patient was given instructions and counseling regarding her condition or for health maintenance advice. Please see specific information pulled into the AVS if appropriate.     Part of this note may be an electronic transcription/translation of spoken language to printed text using the Dragon Dictation System.

## 2022-06-02 ENCOUNTER — OFFICE VISIT (OUTPATIENT)
Dept: PSYCHIATRY | Facility: CLINIC | Age: 32
End: 2022-06-02

## 2022-06-02 VITALS
BODY MASS INDEX: 40.88 KG/M2 | DIASTOLIC BLOOD PRESSURE: 82 MMHG | HEART RATE: 82 BPM | HEIGHT: 60 IN | OXYGEN SATURATION: 99 % | TEMPERATURE: 97.8 F | SYSTOLIC BLOOD PRESSURE: 120 MMHG | WEIGHT: 208.2 LBS

## 2022-06-02 DIAGNOSIS — F41.1 GENERALIZED ANXIETY DISORDER: ICD-10-CM

## 2022-06-02 DIAGNOSIS — F90.0 ADHD (ATTENTION DEFICIT HYPERACTIVITY DISORDER), INATTENTIVE TYPE: ICD-10-CM

## 2022-06-02 DIAGNOSIS — F33.1 MAJOR DEPRESSIVE DISORDER, RECURRENT, MODERATE: Primary | ICD-10-CM

## 2022-06-02 PROCEDURE — 99215 OFFICE O/P EST HI 40 MIN: CPT

## 2022-06-02 PROCEDURE — 99417 PROLNG OP E/M EACH 15 MIN: CPT

## 2022-06-02 RX ORDER — VILAZODONE HYDROCHLORIDE 20 MG/1
20 TABLET ORAL DAILY
Qty: 30 TABLET | Refills: 0 | Status: SHIPPED | OUTPATIENT
Start: 2022-06-02 | End: 2022-06-30 | Stop reason: SDUPTHER

## 2022-06-02 RX ORDER — ATOMOXETINE 25 MG/1
25 CAPSULE ORAL DAILY
Qty: 30 CAPSULE | Refills: 0 | Status: SHIPPED | OUTPATIENT
Start: 2022-06-02 | End: 2022-06-30

## 2022-06-02 RX ORDER — BUSPIRONE HYDROCHLORIDE 5 MG/1
5 TABLET ORAL DAILY
Qty: 30 TABLET | Refills: 0 | Status: SHIPPED | OUTPATIENT
Start: 2022-06-02 | End: 2022-06-30

## 2022-06-02 NOTE — PROGRESS NOTES
"Subjective   Enriqueta Gotti is a 31 y.o. female who is here today for medication management follow-up appointment.    Chief Complaint: Anxiety    HPI:  History of Present Illness     Patient denies negative side effects of current regimen.  Patient does identify influx of situational stressors that have been negatively impacting her mood.  She identifies interpersonal difficulty between her and her spouse.  She reports that she has not noticed any negative mood changes associated with Viibryd but also identifies she has not noticed any positive change either.  Continues to feel that she is \"worried about everything.\" She continues to endorse fatigue, intermittent feelings of dysphoria, decreased concentration, frequent crying spells related to feeling \"overwhelmed.\"  Denies any feelings of helplessness and hopelessness.  Unable to place anxiety depression with scale 0 to [place] worst.  Continues to identify difficulty with sleep initiation, duration and quality.  Reports intermittent awakenings and approximates 4 to 5 hours of sleep per night with daytime fatigue.  Denies any occurrence of nightmares.  No reported change in appetite approximating 2 meals per day. Body mass index is 40.66 kg/m².She reports a chronic history and difficulty with forgetfulness, disorganization, task initiation/task completion, procrastination, distractibility.  PTSD symptomology remains present and unchanged.  Denies any self-harm behaviors.  Denies AVH.  Patient adamantly denies SI and HI.    Past Psych History: Inpatient admission x3; described above.  Patient reports previous outpatient medication management through [facility], [facility], most [facility].  Patient denies a history of self-harm.  Patient denies a history of TBI or seizures.  Patient reports previous diagnosis of anxiety, depression, bipolar disorder.    Previous Psych Meds: Patient reports that she has trialed several medications in the " past but is unsure of them all.  She does endorse that she had negative side effects with Effexor, Zoloft, Depakote, and Wellbutrin.  She does identify that she has previously trialed Invega but experienced an increase in prolactin levels related to this. Gene site testing was conducted at the [facility].    Substance Abuse: Patient denies history or current illicit substance use, EtOH.  She does endorse daily nicotine use in the form of cigarettes x13 years approximating 0.5 to 1 pack/day with last reported use being today.  Patient does report caffeine intake approximating 16 to 32 ounces daily.    Social History: Patient identifies that she was born and raised in [place] until she was in second grade.  Patient reports that primary custody until she was in  was to her biological father.  Identifies a strained relationship with biological mother.  Patient identifies that she was raised by paternal grandparents and adopted.  She identifies that after leaving [place] that she moved to [place], [place] then to [place], [place] then to [place].  She identifies that she then moved to [facility] x1 year.   3 times total; however, 2 marriages to the same person.  She identifies first marriage x10 months.  Second marriage 1 year.  Third marriage x3-1/2 years.  Endorses that first and third marriage is to current .  Reports previous relationships between marriage courses.  2 children.  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  High school graduate, some college.  Denies any previous or pending legal matters.      Family Psychiatric History:  family history includes ADD / ADHD in her child; Anxiety disorder in her mother; Asthma in an other family member; Bipolar disorder in her mother; Diabetes in an other family member; Heart disease in an other family member; Hypertension in an other family member; Kidney disease in an other family member; Osteoarthritis in  an other family member; Osteoporosis in an other family member; Schizophrenia in her brother; Stroke in an other family member.    Medical/Surgical History:  Past Medical History:   Diagnosis Date   • Ankle sprain     right   • Anxiety    • Bipolar disorder (HCC)    • Carpal tunnel syndrome    • Chronic pain disorder     Fibroymyalgia   • Depression    • Suicide attempt (HCC)    • Tear of meniscus of knee     left      Past Surgical History:   Procedure Laterality Date   • CARPAL TUNNEL RELEASE     •  SECTION     • CHOLECYSTECTOMY     • LAPAROSCOPIC TUBAL LIGATION     • TONSILLECTOMY AND ADENOIDECTOMY     • TUBAL ABDOMINAL LIGATION         Allergies   Allergen Reactions   • Codeine    • Penicillins    • Sulfa Antibiotics            Current Medications:   Current Outpatient Medications   Medication Sig Dispense Refill   • busPIRone (BUSPAR) 5 MG tablet Take 1 tablet by mouth Daily for 30 days. 30 tablet 0   • atomoxetine (Strattera) 25 MG capsule Take 1 capsule by mouth Daily. 30 capsule 0   • cetirizine (zyrTEC) 10 MG tablet Take 1 tablet by mouth Daily for 90 days. 30 tablet 2   • vilazodone (Viibryd) 20 MG tablet tablet Take 1 tablet by mouth Daily. 30 tablet 0     No current facility-administered medications for this visit.         Review of Systems   Constitutional: Positive for activity change, appetite change and fatigue.   HENT: Negative for drooling and sore throat.    Eyes: Negative for redness and visual disturbance.   Respiratory: Negative for chest tightness.    Cardiovascular: Negative for chest pain and palpitations.   Gastrointestinal: Negative for abdominal pain, diarrhea and nausea.   Endocrine: Negative for polydipsia and polyuria.   Genitourinary: Negative for frequency and urgency.   Musculoskeletal: Negative for back pain and gait problem.   Skin: Negative for pallor and rash.   Allergic/Immunologic: Negative for environmental allergies and immunocompromised state.   Neurological:  "Negative for dizziness, tremors, seizures, syncope, facial asymmetry, speech difficulty, weakness, light-headedness, numbness and headaches.   Hematological: Negative for adenopathy. Does not bruise/bleed easily.   Psychiatric/Behavioral: Positive for decreased concentration, dysphoric mood and sleep disturbance. Negative for agitation, behavioral problems, confusion, hallucinations, self-injury and suicidal ideas. The patient is nervous/anxious. The patient is not hyperactive.     denies HEENT, cardiovascular, respiratory, liver, renal, GI/, endocrine, neuro, DERM, hematology, immunology, musculoskeletal disorders.    Objective   Physical Exam  Vitals reviewed.   Constitutional:       Appearance: Normal appearance. She is obese.   HENT:      Head: Normocephalic.      Nose: Nose normal.      Mouth/Throat:      Mouth: Mucous membranes are moist.      Pharynx: Oropharynx is clear.   Eyes:      Extraocular Movements: Extraocular movements intact.      Pupils: Pupils are equal, round, and reactive to light.   Cardiovascular:      Rate and Rhythm: Normal rate.   Pulmonary:      Effort: Pulmonary effort is normal.   Musculoskeletal:         General: Normal range of motion.      Cervical back: Normal range of motion.   Skin:     General: Skin is warm and dry.   Neurological:      General: No focal deficit present.      Mental Status: She is alert and oriented to person, place, and time. Mental status is at baseline.   Psychiatric:         Attention and Perception: Attention and perception normal.         Mood and Affect: Mood is anxious. Affect is tearful.         Speech: Speech normal.         Behavior: Behavior normal. Behavior is cooperative.         Thought Content: Thought content normal.         Cognition and Memory: Cognition and memory normal.         Judgment: Judgment normal.       Blood pressure 120/82, pulse 82, temperature 97.8 °F (36.6 °C), height 152.4 cm (60\"), weight 94.4 kg (208 lb 3.2 oz), SpO2 99 " %.    Mental Status Exam:   Hygiene:   good  Cooperation:  Cooperative  Eye Contact:  Good  Psychomotor Behavior:  Restless  Affect:  Full range and Appropriate  Hopelessness: Denies  Speech:  Normal  Thought Process:  Goal directed and Linear  Thought Content:  Normal and Mood congruent  Suicidal:  None  Homicidal:  None  Hallucinations:  None  Delusion:  None  Memory:  Intact  Orientation:  Person, Place, Time and Situation  Reliability:  fair  Insight:  Fair  Judgement:  Fair  Impulse Control:  Fair  Physical/Medical Issues:  No       Short-term goals: Patient will be compliant with clinic appointments.  Patient will be engaged in therapy, medication compliant with minimal side effects. Patient  will report decrease of symptoms and frequency.    Long-term goals: Patient will have minimal symptoms of  with continued medication management. Patient will be compliant with treatment and appointments.     Strengths: Motivation for treatment, insight  Weaknesses: Support, strained family dynamics, recent loss    Functional Status: moderate impairment in areas of daily functioning.  Prognosis: Guarded dependent on medication/follow up and treatment plan compliance.    Enriqueta Gotti  reports that she has been smoking cigarettes. She started smoking about 6 years ago. She has a 3.00 pack-year smoking history. She has never used smokeless tobacco.. I have educated her on the risk of diseases from using tobacco products such as cancer, COPD, heart disease and reproductive problems.     I advised her to quit and she is not willing to quit.    I spent 3  minutes counseling the patient.    Assessment & Plan   Diagnoses and all orders for this visit:    1. Major depressive disorder, recurrent, moderate (HCC) (Primary)  -     vilazodone (Viibryd) 20 MG tablet tablet; Take 1 tablet by mouth Daily.  Dispense: 30 tablet; Refill: 0    2. ADHD (attention deficit hyperactivity disorder), inattentive type  -     atomoxetine  (Strattera) 25 MG capsule; Take 1 capsule by mouth Daily.  Dispense: 30 capsule; Refill: 0    3. Generalized anxiety disorder  -     busPIRone (BUSPAR) 5 MG tablet; Take 1 tablet by mouth Daily for 30 days.  Dispense: 30 tablet; Refill: 0  -     vilazodone (Viibryd) 20 MG tablet tablet; Take 1 tablet by mouth Daily.  Dispense: 30 tablet; Refill: 0        -Continue Viibryd 20 mg p.o. daily for anxiety and depression  -Decrease BuSpar to 5 mg p.o daily for anxiety related to reports of increased daytime fatigue with current dosing.  -Start Strattera 25 mg p.o. daily for attention and focus  -CPT 3 conducted resulting in 4 atypical T score showing difficulty with inattention and impulsivity.  -Recommend psychotherapy with LCSW  -Medications sent to pharmacy at this time    Discussed medication and psychotherapy options. I've explained to her that drugs of the SSRI/SNRI/modulators class can have side effects such as weight gain, sexual dysfunction, insomnia, headache, nausea. Discussed the risks, benefits, and side effects of the medication; client acknowledged and verbally consented.  Patient is aware to contact the [facility] with any worsening of symptom.  Patient is agreeable to go to the ER or call 911 should they begin SI/HI.    SUPPORTIVE PSYCHOTHERAPY: continuing efforts to promote the therapeutic alliance, address the patient’s issues, and strengthen self awareness, insights, and coping skills.  Assisted patient in processing above session content; acknowledged and normalized patient’s thoughts, feelings, and concerns.  Applied  positive coping skills and behavior management in session.  Allowed patient to freely discuss issues without interruption or judgment. Provided safe, confidential environment to facilitate the development of positive therapeutic relationship and encourage open, honest communication. Assisted patient in identifying risk factors which would indicate the need for higher level of care  including thoughts to harm self or others and/or self-harming behavior and encouraged patient to contact this office, call 911, or present to the nearest emergency room should any of these events occur. Discussed crisis intervention services and means to access.  Patient adamantly and convincingly denies current suicidal or homicidal ideation or perceptual disturbance.    Return in about 4 weeks (around 6/30/2022), or if symptoms worsen or fail to improve, for Next scheduled follow up.      This document has been electronically signed by DALILA Guthrie   June 2, 2022 17:35 EDT   Errors in dictation may reflect use of voice recognition software and not all errors in transcription may have been detected prior to signing.      I spent a total of 94 minutes face-to-face with patient in psychotherapy as described above, diagnosis, medication options/potential side effects, and ordering medications.

## 2022-06-06 ENCOUNTER — HOSPITAL ENCOUNTER (EMERGENCY)
Facility: HOSPITAL | Age: 32
Discharge: HOME | End: 2022-06-06
Admitting: EMERGENCY MEDICINE

## 2022-06-06 VITALS
HEIGHT: 59 IN | HEART RATE: 95 BPM | BODY MASS INDEX: 41.53 KG/M2 | DIASTOLIC BLOOD PRESSURE: 80 MMHG | RESPIRATION RATE: 20 BRPM | SYSTOLIC BLOOD PRESSURE: 123 MMHG | WEIGHT: 206 LBS | OXYGEN SATURATION: 96 % | TEMPERATURE: 98 F

## 2022-06-06 DIAGNOSIS — K04.7 DENTAL ABSCESS: Primary | ICD-10-CM

## 2022-06-06 PROCEDURE — 99283 EMERGENCY DEPT VISIT LOW MDM: CPT

## 2022-06-06 RX ORDER — HYDROCODONE BITARTRATE AND ACETAMINOPHEN 7.5; 325 MG/1; MG/1
1 TABLET ORAL ONCE
Status: COMPLETED | OUTPATIENT
Start: 2022-06-06 | End: 2022-06-06

## 2022-06-06 RX ORDER — KETOROLAC TROMETHAMINE 30 MG/ML
30 INJECTION, SOLUTION INTRAMUSCULAR; INTRAVENOUS ONCE
Status: DISCONTINUED | OUTPATIENT
Start: 2022-06-06 | End: 2022-06-06

## 2022-06-06 RX ORDER — CLINDAMYCIN HYDROCHLORIDE 300 MG/1
300 CAPSULE ORAL 3 TIMES DAILY
Qty: 30 CAPSULE | Refills: 0 | Status: SHIPPED | OUTPATIENT
Start: 2022-06-06 | End: 2022-06-15 | Stop reason: SDUPTHER

## 2022-06-06 RX ADMIN — HYDROCODONE BITARTRATE AND ACETAMINOPHEN 1 TABLET: 7.5; 325 TABLET ORAL at 23:00

## 2022-06-06 RX ADMIN — BENZOCAINE 30 ML: 200 LIQUID DENTAL; ORAL; PERIODONTAL at 23:00

## 2022-06-07 NOTE — ED PROVIDER NOTES
Subjective   31-year-old female with past medical history of anxiety, bipolar disorder, CTS, chronic pain disorder, depression, suicide attempt, and left meniscal tear presents to the emergency room with a left-sided dental pain and facial swelling for the past couple of days.  Patient states the pain is radiating to her left ear.  She states that she has not had any recent dental work and to her knowledge has not had any dental fractures, but states she does have bad teeth and needs to see a dentist however states that she does not like going to the dentist.  She does state that she has tried in the past to get a dentist but cannot find anyone that will take her Medicaid insurance.  Aggravating factors include eating and drinking.  Denies any alleviating factors.  Denies any other complaints or concerns at this time.      History provided by:  Patient   used: No        Review of Systems   Constitutional: Negative.  Negative for fever.   HENT: Positive for dental problem.    Respiratory: Negative.    Cardiovascular: Negative.  Negative for chest pain.   Gastrointestinal: Negative.  Negative for abdominal pain.   Endocrine: Negative.    Genitourinary: Negative.  Negative for dysuria.   Skin: Negative.    Neurological: Negative.    Psychiatric/Behavioral: Negative.    All other systems reviewed and are negative.      Past Medical History:   Diagnosis Date   • Ankle sprain     right   • Anxiety    • Bipolar disorder (HCC)    • Carpal tunnel syndrome    • Chronic pain disorder     Fibroymyalgia   • Depression    • Suicide attempt (HCC)    • Tear of meniscus of knee     left        Allergies   Allergen Reactions   • Codeine    • Penicillins    • Sulfa Antibiotics        Past Surgical History:   Procedure Laterality Date   • CARPAL TUNNEL RELEASE     •  SECTION     • CHOLECYSTECTOMY     • LAPAROSCOPIC TUBAL LIGATION     • TONSILLECTOMY AND ADENOIDECTOMY     • TUBAL ABDOMINAL LIGATION          Family History   Problem Relation Age of Onset   • Anxiety disorder Mother    • Bipolar disorder Mother    • Schizophrenia Brother    • Osteoarthritis Other    • Osteoporosis Other    • Heart disease Other    • Hypertension Other    • Asthma Other    • Diabetes Other    • Kidney disease Other    • Stroke Other    • ADD / ADHD Child        Social History     Socioeconomic History   • Marital status:    Tobacco Use   • Smoking status: Current Every Day Smoker     Packs/day: 1.00     Years: 3.00     Pack years: 3.00     Types: Cigarettes     Start date: 1/17/2016   • Smokeless tobacco: Never Used   • Tobacco comment: Trying to quit.   Vaping Use   • Vaping Use: Never used   Substance and Sexual Activity   • Alcohol use: Yes     Comment: rarely   • Drug use: No   • Sexual activity: Defer           Objective   Physical Exam  Vitals and nursing note reviewed.   Constitutional:       General: She is not in acute distress.     Appearance: She is well-developed. She is not diaphoretic.   HENT:      Head: Normocephalic and atraumatic.      Right Ear: External ear normal.      Left Ear: External ear normal.      Nose: Nose normal.      Mouth/Throat:      Dentition: Dental caries and dental abscesses present.   Eyes:      Conjunctiva/sclera: Conjunctivae normal.      Pupils: Pupils are equal, round, and reactive to light.   Neck:      Vascular: No JVD.      Trachea: No tracheal deviation.   Cardiovascular:      Rate and Rhythm: Normal rate and regular rhythm.      Heart sounds: Normal heart sounds. No murmur heard.  Pulmonary:      Effort: Pulmonary effort is normal. No respiratory distress.      Breath sounds: Normal breath sounds. No wheezing.   Abdominal:      General: Bowel sounds are normal.      Palpations: Abdomen is soft.      Tenderness: There is no abdominal tenderness.   Musculoskeletal:         General: No deformity. Normal range of motion.      Cervical back: Normal range of motion and neck supple.    Skin:     General: Skin is warm and dry.      Coloration: Skin is not pale.      Findings: No erythema or rash.   Neurological:      Mental Status: She is alert and oriented to person, place, and time.      Cranial Nerves: No cranial nerve deficit.   Psychiatric:         Behavior: Behavior normal.         Thought Content: Thought content normal.         Procedures           ED Course                                                 MDM  Number of Diagnoses or Management Options  Dental abscess: new and does not require workup  Risk of Complications, Morbidity, and/or Mortality  Presenting problems: low  Diagnostic procedures: minimal  Management options: moderate    Patient Progress  Patient progress: stable      Final diagnoses:   Dental abscess       ED Disposition  ED Disposition     ED Disposition   Discharge    Condition   Stable    Comment   --             Angela Aguilar, DMD  [address] [address]  [phone]    In 2 days           Medication List      New Prescriptions    clindamycin 300 MG capsule  Commonly known as: CLEOCIN  Take 1 capsule by mouth 3 (Three) Times a Day.           Where to Get Your Medications      These medications were sent to [facility] [address] [place] [address] - [phone]  - [phone] [address] [place] [id number]    Phone: [phone]   · clindamycin 300 MG capsule          Maria Elena Flanagan PA-C  06/06/22 1568

## 2022-06-14 ENCOUNTER — HOSPITAL ENCOUNTER (EMERGENCY)
Facility: HOSPITAL | Age: 32
Discharge: HOME | End: 2022-06-14
Attending: STUDENT IN AN ORGANIZED HEALTH CARE EDUCATION/TRAINING PROGRAM | Admitting: STUDENT IN AN ORGANIZED HEALTH CARE EDUCATION/TRAINING PROGRAM

## 2022-06-14 VITALS
SYSTOLIC BLOOD PRESSURE: 137 MMHG | OXYGEN SATURATION: 97 % | TEMPERATURE: 98.6 F | HEART RATE: 107 BPM | WEIGHT: 206 LBS | HEIGHT: 59 IN | DIASTOLIC BLOOD PRESSURE: 87 MMHG | RESPIRATION RATE: 19 BRPM | BODY MASS INDEX: 41.53 KG/M2

## 2022-06-14 DIAGNOSIS — K08.89 PAIN, DENTAL: Primary | ICD-10-CM

## 2022-06-14 PROCEDURE — 25010000002 DEXAMETHASONE PER 1 MG: Performed by: STUDENT IN AN ORGANIZED HEALTH CARE EDUCATION/TRAINING PROGRAM

## 2022-06-14 PROCEDURE — 63710000001 ONDANSETRON ODT 4 MG TABLET DISPERSIBLE: Performed by: STUDENT IN AN ORGANIZED HEALTH CARE EDUCATION/TRAINING PROGRAM

## 2022-06-14 PROCEDURE — 25010000002 KETOROLAC TROMETHAMINE PER 15 MG: Performed by: STUDENT IN AN ORGANIZED HEALTH CARE EDUCATION/TRAINING PROGRAM

## 2022-06-14 PROCEDURE — 99283 EMERGENCY DEPT VISIT LOW MDM: CPT

## 2022-06-14 PROCEDURE — 96372 THER/PROPH/DIAG INJ SC/IM: CPT

## 2022-06-14 RX ORDER — ONDANSETRON 4 MG/1
4 TABLET, ORALLY DISINTEGRATING ORAL ONCE
Status: COMPLETED | OUTPATIENT
Start: 2022-06-14 | End: 2022-06-14

## 2022-06-14 RX ORDER — DEXAMETHASONE SODIUM PHOSPHATE 4 MG/ML
4 INJECTION, SOLUTION INTRA-ARTICULAR; INTRALESIONAL; INTRAMUSCULAR; INTRAVENOUS; SOFT TISSUE ONCE
Status: COMPLETED | OUTPATIENT
Start: 2022-06-14 | End: 2022-06-14

## 2022-06-14 RX ORDER — KETOROLAC TROMETHAMINE 30 MG/ML
30 INJECTION, SOLUTION INTRAMUSCULAR; INTRAVENOUS ONCE
Status: COMPLETED | OUTPATIENT
Start: 2022-06-14 | End: 2022-06-14

## 2022-06-14 RX ADMIN — ONDANSETRON 4 MG: 4 TABLET, ORALLY DISINTEGRATING ORAL at 22:41

## 2022-06-14 RX ADMIN — KETOROLAC TROMETHAMINE 30 MG: 30 INJECTION, SOLUTION INTRAMUSCULAR; INTRAVENOUS at 22:22

## 2022-06-14 RX ADMIN — DEXAMETHASONE SODIUM PHOSPHATE 4 MG: 4 INJECTION, SOLUTION INTRA-ARTICULAR; INTRALESIONAL; INTRAMUSCULAR; INTRAVENOUS; SOFT TISSUE at 22:22

## 2022-06-15 ENCOUNTER — OFFICE VISIT (OUTPATIENT)
Dept: FAMILY MEDICINE CLINIC | Facility: CLINIC | Age: 32
End: 2022-06-15

## 2022-06-15 VITALS
WEIGHT: 208.2 LBS | BODY MASS INDEX: 41.97 KG/M2 | HEART RATE: 119 BPM | OXYGEN SATURATION: 98 % | SYSTOLIC BLOOD PRESSURE: 134 MMHG | DIASTOLIC BLOOD PRESSURE: 78 MMHG | TEMPERATURE: 96.8 F | HEIGHT: 59 IN

## 2022-06-15 DIAGNOSIS — L02.91 ABSCESS: Primary | ICD-10-CM

## 2022-06-15 DIAGNOSIS — R52 SEVERE PAIN: ICD-10-CM

## 2022-06-15 PROCEDURE — 99213 OFFICE O/P EST LOW 20 MIN: CPT | Performed by: FAMILY MEDICINE

## 2022-06-15 RX ORDER — DIPHENHYDRAMINE, LIDOCAINE, NYSTATIN
5 KIT ORAL 4 TIMES DAILY
Qty: 60 ML | Refills: 2 | Status: SHIPPED | OUTPATIENT
Start: 2022-06-15 | End: 2022-07-18

## 2022-06-15 RX ORDER — CLINDAMYCIN HYDROCHLORIDE 300 MG/1
300 CAPSULE ORAL 3 TIMES DAILY
Qty: 21 CAPSULE | Refills: 0 | Status: SHIPPED | OUTPATIENT
Start: 2022-06-15 | End: 2022-06-22

## 2022-06-15 RX ORDER — SACCHAROMYCES BOULARDII 250 MG
250 CAPSULE ORAL 2 TIMES DAILY
Qty: 60 CAPSULE | Refills: 1 | Status: SHIPPED | OUTPATIENT
Start: 2022-06-15 | End: 2022-07-28 | Stop reason: SDDI

## 2022-06-15 RX ORDER — HYDROCODONE BITARTRATE AND ACETAMINOPHEN 5; 325 MG/1; MG/1
1 TABLET ORAL EVERY 12 HOURS PRN
Qty: 6 TABLET | Refills: 0 | Status: SHIPPED | OUTPATIENT
Start: 2022-06-15 | End: 2022-06-18

## 2022-06-15 RX ORDER — ONDANSETRON 4 MG/1
4 TABLET, ORALLY DISINTEGRATING ORAL EVERY 8 HOURS PRN
Qty: 21 TABLET | Refills: 0 | Status: SHIPPED | OUTPATIENT
Start: 2022-06-15 | End: 2022-07-28 | Stop reason: SDUPTHER

## 2022-06-15 NOTE — PROGRESS NOTES
"Chief Complaint  Pain (Teeth )    Subjective          Enriqueta Gotti presents to [facility]  Dental Pain   This is a new problem. The current episode started in the past 7 days. The problem has been gradually worsening. The pain is at a severity of 8/10. The pain is severe. Associated symptoms include facial pain and sinus pressure. She has tried acetaminophen, NSAIDs, heat, ice and rest for the symptoms. The treatment provided no relief.   Has seen the dentist they are not able to get her into the surgeon for 2 more weeks. Has been taking antibiotics. Dentist suggest another round of antibiotics if pain did not improve.     Review of Systems   HENT: Positive for sinus pressure.          Objective   Vital Signs:   /78 (BP Location: Right arm, Patient Position: Sitting, Cuff Size: Large Adult)   Pulse 119   Temp 96.8 °F (36 °C) (Temporal)   Ht 149.9 cm (59\")   Wt 94.4 kg (208 lb 3.2 oz)   SpO2 98%   BMI 42.05 kg/m²     Physical Exam  Constitutional:       General: She is not in acute distress.     Appearance: Normal appearance. She is well-developed and well-groomed. She is not ill-appearing, toxic-appearing or diaphoretic.   HENT:      Head: Normocephalic.      Right Ear: Tympanic membrane, ear canal and external ear normal.      Left Ear: Tympanic membrane, ear canal and external ear normal.      Nose: Nose normal. No congestion or rhinorrhea.      Mouth/Throat:      Mouth: Mucous membranes are moist.      Dentition: Dental tenderness, dental caries and dental abscesses present.      Pharynx: Oropharynx is clear. No oropharyngeal exudate or posterior oropharyngeal erythema.   Eyes:      General: Lids are normal.         Right eye: No discharge.         Left eye: No discharge.      Extraocular Movements: Extraocular movements intact.      Pupils: Pupils are equal, round, and reactive to light.   Neck:      Vascular: No carotid bruit.   Cardiovascular:      Rate " and Rhythm: Normal rate and regular rhythm.      Pulses: Normal pulses.      Heart sounds: Normal heart sounds. No murmur heard.    No friction rub. No gallop.   Pulmonary:      Effort: Pulmonary effort is normal. No respiratory distress.      Breath sounds: Normal breath sounds. No stridor. No wheezing, rhonchi or rales.   Chest:      Chest wall: No tenderness.   Abdominal:      General: Bowel sounds are normal. There is no distension.      Palpations: Abdomen is soft. There is no mass.      Tenderness: There is no abdominal tenderness. There is no right CVA tenderness, left CVA tenderness, guarding or rebound.      Hernia: No hernia is present.   Musculoskeletal:         General: No swelling or tenderness. Normal range of motion.      Cervical back: Normal range of motion and neck supple. No rigidity or tenderness.      Right lower leg: No edema.      Left lower leg: No edema.   Lymphadenopathy:      Cervical: No cervical adenopathy.   Skin:     General: Skin is warm.      Capillary Refill: Capillary refill takes less than 2 seconds.      Coloration: Skin is not jaundiced.      Findings: No bruising, erythema or rash.   Neurological:      General: No focal deficit present.      Mental Status: She is alert and oriented to person, place, and time.      Motor: Motor function is intact. No weakness.      Coordination: Coordination is intact.      Gait: Gait is intact. Gait normal.   Psychiatric:         Attention and Perception: Attention normal.         Mood and Affect: Mood normal.         Speech: Speech normal.         Behavior: Behavior normal.         Cognition and Memory: Cognition normal.         Judgment: Judgment normal.        Result Review :                 Assessment and Plan    Diagnoses and all orders for this visit:    1. Abscess (Primary)  -     HYDROcodone-acetaminophen (NORCO) 5-325 MG per tablet; Take 1 tablet by mouth Every 12 (Twelve) Hours As Needed for Severe Pain  for up to 3 days.  Dispense: 6  tablet; Refill: 0  -     clindamycin (CLEOCIN) 300 MG capsule; Take 1 capsule by mouth 3 (Three) Times a Day for 7 days.  Dispense: 21 capsule; Refill: 0  -     nystatin susp + lidocaine viscous (MAGIC MOUTHWASH) oral suspension; Swish and swallow 5 mL 4 (Four) Times a Day.  Dispense: 60 mL; Refill: 2    2. Severe pain  -     HYDROcodone-acetaminophen (NORCO) 5-325 MG per tablet; Take 1 tablet by mouth Every 12 (Twelve) Hours As Needed for Severe Pain  for up to 3 days.  Dispense: 6 tablet; Refill: 0    Other orders  -     saccharomyces boulardii (Florastor) 250 MG capsule; Take 1 capsule by mouth 2 (Two) Times a Day.  Dispense: 60 capsule; Refill: 1  -     ondansetron ODT (Zofran ODT) 4 MG disintegrating tablet; Place 1 tablet on the tongue Every 8 (Eight) Hours As Needed for Nausea or Vomiting.  Dispense: 21 tablet; Refill: 0      Patient's Body mass index is 42.05 kg/m². indicating that she is morbidly obese (BMI > 40 or > 35 with obesity - related health condition). Obesity-related health conditions include the following: none. Obesity is unchanged. BMI is is above average; BMI management plan is completed. We discussed low calorie, low carb based diet program, portion control and increasing exercise..    Follow Up   No follow-ups on file.  Patient was given instructions and counseling regarding her condition or for health maintenance advice. Please see specific information pulled into the AVS if appropriate.

## 2022-06-15 NOTE — ED PROVIDER NOTES
Subjective   31-year-old female presents to the ER with primary complaint of dental pain.  Patient noted she is currently on antibiotic regimen.  Patient is being scheduled with [facility] dentistry to have multiple teeth extracted.  Patient noted persistent pain with radiation to the left maxillary region.  Patient noted multiple sensitivities to hot and cold and particular food items.  Denied obvious alleviating factors.  No significant headache.  No meningismal symptoms.  No fever.  Vitals stable          Review of Systems   HENT: Positive for dental problem.    All other systems reviewed and are negative.      Past Medical History:   Diagnosis Date   • Ankle sprain     right   • Anxiety    • Bipolar disorder (HCC)    • Carpal tunnel syndrome    • Chronic pain disorder     Fibroymyalgia   • Depression    • Suicide attempt ([facility])    • Tear of meniscus of knee     left        Allergies   Allergen Reactions   • Codeine    • Penicillins    • Sulfa Antibiotics        Past Surgical History:   Procedure Laterality Date   • CARPAL TUNNEL RELEASE     •  SECTION     • CHOLECYSTECTOMY     • LAPAROSCOPIC TUBAL LIGATION     • TONSILLECTOMY AND ADENOIDECTOMY     • TUBAL ABDOMINAL LIGATION         Family History   Problem Relation Age of Onset   • Anxiety disorder Mother    • Bipolar disorder Mother    • Schizophrenia Brother    • Osteoarthritis Other    • Osteoporosis Other    • Heart disease Other    • Hypertension Other    • Asthma Other    • Diabetes Other    • Kidney disease Other    • Stroke Other    • ADD / ADHD Child        Social History     Socioeconomic History   • Marital status:    Tobacco Use   • Smoking status: Current Every Day Smoker     Packs/day: 1.00     Years: 3.00     Pack years: 3.00     Types: Cigarettes     Start date: 2016   • Smokeless tobacco: Never Used   • Tobacco comment: Trying to quit.   Vaping Use   • Vaping Use: Never used   Substance and Sexual Activity   •  Alcohol use: Yes     Comment: rarely   • Drug use: No   • Sexual activity: Defer           Objective   Physical Exam  Constitutional:       General: She is not in acute distress.     Appearance: Normal appearance. She is not ill-appearing.   HENT:      Head: Normocephalic and atraumatic.      Right Ear: External ear normal.      Left Ear: External ear normal.      Nose: Nose normal.      Mouth/Throat:      Mouth: Mucous membranes are moist.      Dentition: Abnormal dentition. Dental tenderness present.     Eyes:      Extraocular Movements: Extraocular movements intact.      Pupils: Pupils are equal, round, and reactive to light.   Cardiovascular:      Rate and Rhythm: Normal rate and regular rhythm.      Heart sounds: No murmur heard.  Pulmonary:      Effort: Pulmonary effort is normal. No respiratory distress.      Breath sounds: Normal breath sounds. No wheezing.   Abdominal:      General: Bowel sounds are normal.      Palpations: Abdomen is soft.      Tenderness: There is no abdominal tenderness.   Musculoskeletal:         General: No deformity or signs of injury. Normal range of motion.      Cervical back: Normal range of motion and neck supple.   Skin:     General: Skin is warm and dry.      Findings: No erythema.   Neurological:      General: No focal deficit present.      Mental Status: She is alert and oriented to person, place, and time. Mental status is at baseline.      Cranial Nerves: No cranial nerve deficit.   Psychiatric:         Mood and Affect: Mood normal.         Behavior: Behavior normal.         Thought Content: Thought content normal.         Procedures           ED Course  ED Course as of 06/14/22 2238 Tue Jun 14, 2022 2237 No significant concerns for abscess.  Counseled the patient to continue her clindamycin.  Toradol Decadron given.  Counseled the patient to follow-up with dentistry for further tooth extraction.  Work up and results were discussed throughly with the patient.  The  patient will be discharged for further monitoring and management with their PCP.  Red flags, warning signs, worsening symptoms, and when to return to the ER discussed with and understood by the patient.  Patient will follow up with their PCP in a timely manner.  Vitals stable at discharge. [SF]      ED Course User Index  [SF] Van Silverman DO                                                 [facility]    Final diagnoses:   Pain, dental       ED Disposition  ED Disposition     ED Disposition   Discharge    Condition   Stable    Comment   --             Laurie Mathis, APRN  [address] [facility] [address]  [phone]    In 1 week      [facility]  [address] [place] [id number] [phone]    If symptoms worsen         Medication List      No changes were made to your prescriptions during this visit.          Van Silverman DO  06/14/22 0687

## 2022-06-30 ENCOUNTER — OFFICE VISIT (OUTPATIENT)
Dept: PSYCHIATRY | Facility: CLINIC | Age: 32
End: 2022-06-30

## 2022-06-30 VITALS
WEIGHT: 207.4 LBS | HEIGHT: 59 IN | TEMPERATURE: 97.3 F | DIASTOLIC BLOOD PRESSURE: 85 MMHG | BODY MASS INDEX: 41.81 KG/M2 | SYSTOLIC BLOOD PRESSURE: 126 MMHG | OXYGEN SATURATION: 94 % | HEART RATE: 89 BPM

## 2022-06-30 DIAGNOSIS — F33.1 MAJOR DEPRESSIVE DISORDER, RECURRENT, MODERATE: ICD-10-CM

## 2022-06-30 DIAGNOSIS — F90.0 ADHD (ATTENTION DEFICIT HYPERACTIVITY DISORDER), INATTENTIVE TYPE: Primary | ICD-10-CM

## 2022-06-30 DIAGNOSIS — F41.1 GENERALIZED ANXIETY DISORDER: ICD-10-CM

## 2022-06-30 PROCEDURE — 99215 OFFICE O/P EST HI 40 MIN: CPT

## 2022-06-30 RX ORDER — VILAZODONE HYDROCHLORIDE 20 MG/1
20 TABLET ORAL DAILY
Qty: 90 TABLET | Refills: 0 | Status: SHIPPED | OUTPATIENT
Start: 2022-06-30 | End: 2022-09-14 | Stop reason: SDUPTHER

## 2022-06-30 NOTE — PROGRESS NOTES
"Subjective   Enriqueta Gotti is a 31 y.o. female who is here today for medication management follow-up appointment.    Chief Complaint: Anxiety    HPI:  History of Present Illness     Patient denies negative side effects of current regimen.  Patient does identify influx of situational stressors that have been negatively impacting her mood.  She identifies that previously identified interpersonal discord between her and her spouse has slightly improved.  She reports that since last encounter she has been battling a \"mouth infection.\"  She reports that she has had several dental abscesses, has been seen by an oral surgeon, and is scheduled for surgery on July 25.  She identifies that she has been placed on multiple antibiotic regimens as well as medications for pain relief with minimal efficacy.  Denies any fastly variant mood changes before onset of pain.  She reports that she was afraid to begin Strattera while she was experiencing pain as she felt that she would not be able to identify a positive or negative association with it.  Continues to report heightened anxiety associated with things that she cannot change and what if scenarios.  Continues to endorse: Fatigue, intermittent dysphoria, decreased concentration (chronic), and crying spells associated with being \"overwhelmed.\"  Denies any feelings of helplessness or hopelessness.  Denies any worsening nor improvement of anxiety and depression symptoms.  She does identify she feels that a lot of her mood is really mitigated by current pain.  Sleep initiation, duration, and quality approximates 5 to 6 hours per night with daytime fatigue.  Denies any nightmares occurrences.  Does report a reduction in appetite as she identifies an increase in oral pain with eating. Body mass index is 41.89 kg/m².She reports a chronic history and difficulty with forgetfulness, disorganization, task initiation/task completion, procrastination, distractibility.  PTSD " symptomology remains present and unchanged.  Denies any self-harm behaviors.  Denies AVH.  Patient adamantly denies SI and HI.    Past Psych History: Inpatient admission x3; described above.  Patient reports previous outpatient medication management through [facility], [facility], most recent [place] clinics.  Patient denies a history of self-harm.  Patient denies a history of TBI or seizures.  Patient reports previous diagnosis of anxiety, depression, bipolar disorder.    Previous Psych Meds: Patient reports that she has trialed several medications in the past but is unsure of them all.  She does endorse that she had negative side effects with Effexor, Zoloft, Depakote, and Wellbutrin.  She does identify that she has previously trialed Invega but experienced an increase in prolactin levels related to this. Gene site testing was conducted at the [facility].    Substance Abuse: Patient denies history or current illicit substance use, EtOH.  She does endorse daily nicotine use in the form of cigarettes x13 years approximating 0.5 to 1 pack/day with last reported use being today.  Patient does report caffeine intake approximating 16 to 32 ounces daily.    Social History: Patient identifies that she was born and raised in [place] until she was in second grade.  Patient reports that primary custody until she was in  was to her biological father.  Identifies a strained relationship with biological mother.  Patient identifies that she was raised by paternal grandparents and adopted.  She identifies that after leaving [place] that she moved to [place], [place] then to [place], [place] then to [place].  She identifies that she then moved to [facility] x1 year.   3 times total; however, 2 marriages to the same person.  She identifies first marriage x10 months.  Second marriage 1 year.  Third marriage x3-1/2 years.  Endorses that first and third marriage is to current .  Reports  previous relationships between marriage courses.  2 children.  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  High school graduate, some college.  Denies any previous or pending legal matters.      Family Psychiatric History:  family history includes ADD / ADHD in her child; Anxiety disorder in her mother; Asthma in an other family member; Bipolar disorder in her mother; Diabetes in an other family member; Heart disease in an other family member; Hypertension in an other family member; Kidney disease in an other family member; Osteoarthritis in an other family member; Osteoporosis in an other family member; Schizophrenia in her brother; Stroke in an other family member.    Medical/Surgical History:  Past Medical History:   Diagnosis Date   • Ankle sprain     right   • Anxiety    • Bipolar disorder (HCC)    • Carpal tunnel syndrome    • Chronic pain disorder     Fibroymyalgia   • Depression    • Suicide attempt (HCC)    • Tear of meniscus of knee     left      Past Surgical History:   Procedure Laterality Date   • CARPAL TUNNEL RELEASE     •  SECTION     • CHOLECYSTECTOMY     • LAPAROSCOPIC TUBAL LIGATION     • TONSILLECTOMY AND ADENOIDECTOMY     • TUBAL ABDOMINAL LIGATION         Allergies   Allergen Reactions   • Codeine    • Penicillins    • Sulfa Antibiotics            Current Medications:   Current Outpatient Medications   Medication Sig Dispense Refill   • cetirizine (zyrTEC) 10 MG tablet Take 1 tablet by mouth Daily for 90 days. 30 tablet 2   • ondansetron ODT (Zofran ODT) 4 MG disintegrating tablet Place 1 tablet on the tongue Every 8 (Eight) Hours As Needed for Nausea or Vomiting. 21 tablet 0   • saccharomyces boulardii (Florastor) 250 MG capsule Take 1 capsule by mouth 2 (Two) Times a Day. 60 capsule 1   • vilazodone (Viibryd) 20 MG tablet tablet Take 1 tablet by mouth Daily. 30 tablet 0   • nystatin susp + lidocaine viscous (MAGIC MOUTHWASH) oral suspension Swish and swallow 5 mL  4 (Four) Times a Day. 60 mL 2     No current facility-administered medications for this visit.         Review of Systems   Constitutional: Positive for activity change, appetite change and fatigue.   HENT: Positive for dental problem and ear pain. Negative for drooling and sore throat.    Eyes: Negative for redness and visual disturbance.   Respiratory: Negative for chest tightness.    Cardiovascular: Negative for chest pain and palpitations.   Gastrointestinal: Negative for abdominal pain, diarrhea and nausea.   Endocrine: Negative for polydipsia and polyuria.   Genitourinary: Negative for frequency and urgency.   Musculoskeletal: Negative for back pain and gait problem.   Skin: Negative for pallor and rash.   Allergic/Immunologic: Negative for environmental allergies and immunocompromised state.   Neurological: Negative for dizziness, tremors, seizures, syncope, facial asymmetry, speech difficulty, weakness, light-headedness, numbness and headaches.   Hematological: Negative for adenopathy. Does not bruise/bleed easily.   Psychiatric/Behavioral: Positive for decreased concentration, dysphoric mood and sleep disturbance. Negative for agitation, behavioral problems, confusion, hallucinations, self-injury and suicidal ideas. The patient is nervous/anxious. The patient is not hyperactive.     denies HEENT, cardiovascular, respiratory, liver, renal, GI/, endocrine, neuro, DERM, hematology, immunology, musculoskeletal disorders.    Objective   Physical Exam  Vitals reviewed.   Constitutional:       Appearance: Normal appearance. She is obese.   HENT:      Head: Normocephalic.      Nose: Nose normal.      Mouth/Throat:      Mouth: Mucous membranes are moist.      Pharynx: Oropharynx is clear.   Eyes:      Extraocular Movements: Extraocular movements intact.      Pupils: Pupils are equal, round, and reactive to light.   Cardiovascular:      Rate and Rhythm: Normal rate.   Pulmonary:      Effort: Pulmonary effort is  "normal.   Musculoskeletal:         General: Normal range of motion.      Cervical back: Normal range of motion.   Skin:     General: Skin is warm and dry.   Neurological:      General: No focal deficit present.      Mental Status: She is alert and oriented to person, place, and time. Mental status is at baseline.   Psychiatric:         Attention and Perception: Attention and perception normal.         Mood and Affect: Mood is anxious. Affect is tearful.         Speech: Speech normal.         Behavior: Behavior normal. Behavior is cooperative.         Thought Content: Thought content normal.         Cognition and Memory: Cognition and memory normal.         Judgment: Judgment normal.       Blood pressure 126/85, pulse 89, temperature 97.3 °F (36.3 °C), temperature source Temporal, height 149.9 cm (59\"), weight 94.1 kg (207 lb 6.4 oz), last menstrual period 06/06/2022, SpO2 94 %.    Mental Status Exam:   Hygiene:   good  Cooperation:  Cooperative  Eye Contact:  Good  Psychomotor Behavior:  Restless  Affect:  Full range and Appropriate  Hopelessness: Denies  Speech:  Normal  Thought Process:  Goal directed and Linear  Thought Content:  Normal and Mood congruent  Suicidal:  None  Homicidal:  None  Hallucinations:  None  Delusion:  None  Memory:  Intact  Orientation:  Person, Place, Time and Situation  Reliability:  fair  Insight:  Fair  Judgement:  Fair  Impulse Control:  Fair  Physical/Medical Issues:  No       Short-term goals: Patient will be compliant with clinic appointments.  Patient will be engaged in therapy, medication compliant with minimal side effects. Patient  will report decrease of symptoms and frequency.    Long-term goals: Patient will have minimal symptoms of  with continued medication management. Patient will be compliant with treatment and appointments.     Strengths: Motivation for treatment, insight  Weaknesses: Support, strained family dynamics, recent loss    Functional Status: moderate " impairment in areas of daily functioning.  Prognosis: Guarded dependent on medication/follow up and treatment plan compliance.    Enriqueta Gotti  reports that she has been smoking cigarettes. She started smoking about 6 years ago. She has a 3.00 pack-year smoking history. She has never used smokeless tobacco.. I have educated her on the risk of diseases from using tobacco products such as cancer, COPD, heart disease and reproductive problems.     I advised her to quit and she is not willing to quit.    I spent 3  minutes counseling the patient.    Assessment & Plan   Diagnoses and all orders for this visit:    1. ADHD (attention deficit hyperactivity disorder), inattentive type (Primary)    2. Generalized anxiety disorder    3. Major depressive disorder, recurrent, moderate (HCC)      -Elieser reviewed and appropriate for substances described by patient and timeline.  -Continue Viibryd 20 mg p.o. daily for anxiety and depression  -Discontinue BuSpar: Patient denies worsening of mood associated with dose reduction.  -Discontinue Strattera 25 mg p.o. daily for attention and focus  -Recommend psychotherapy with LCSW  -Medications sent to pharmacy at this time    Discussed medication and psychotherapy options.  Patient has tolerated the discontinuation of BuSpar without adverse event.  As knowledgebase continues to expand of patient's chronic and acute symptoms I believe that symptomology is more cohesive with ADHD and unipolar depression.  Patient has trialed several medications without mood improvement for minimal transient changes.  After oral surgery is completed and UDS collection; I believe that patient would benefit from a trial of stimulant medications.I've explained to her that drugs of the SSRI/SNRI/modulators class can have side effects such as weight gain, sexual dysfunction, insomnia, headache, nausea. Discussed the risks, benefits, and side effects of the medication; client acknowledged and verbally  consented.  Patient is aware to contact the [facility] with any worsening of symptom.  Patient is agreeable to go to the ER or call 911 should they begin SI/HI.    SUPPORTIVE PSYCHOTHERAPY: continuing efforts to promote the therapeutic alliance, address the patient’s issues, and strengthen self awareness, insights, and coping skills.  Assisted patient in processing above session content; acknowledged and normalized patient’s thoughts, feelings, and concerns.  Applied  positive coping skills and behavior management in session.  Allowed patient to freely discuss issues without interruption or judgment. Provided safe, confidential environment to facilitate the development of positive therapeutic relationship and encourage open, honest communication. Assisted patient in identifying risk factors which would indicate the need for higher level of care including thoughts to harm self or others and/or self-harming behavior and encouraged patient to contact this office, call 911, or present to the nearest emergency room should any of these events occur. Discussed crisis intervention services and means to access.  Patient adamantly and convincingly denies current suicidal or homicidal ideation or perceptual disturbance.    Return in about 8 weeks (around 8/25/2022), or if symptoms worsen or fail to improve, for Next scheduled follow up.      This document has been electronically signed by DALILA Guthrie   June 30, 2022 14:39 EDT   Errors in dictation may reflect use of voice recognition software and not all errors in transcription may have been detected prior to signing.      I spent a total of 47 minutes face-to-face with patient in psychotherapy as described above, diagnosis, medication options/potential side effects, and ordering medications.

## 2022-07-18 ENCOUNTER — OFFICE VISIT (OUTPATIENT)
Dept: FAMILY MEDICINE CLINIC | Facility: CLINIC | Age: 32
End: 2022-07-18

## 2022-07-18 VITALS
BODY MASS INDEX: 41.04 KG/M2 | WEIGHT: 203.6 LBS | RESPIRATION RATE: 16 BRPM | OXYGEN SATURATION: 98 % | HEART RATE: 93 BPM | SYSTOLIC BLOOD PRESSURE: 126 MMHG | TEMPERATURE: 96.9 F | DIASTOLIC BLOOD PRESSURE: 82 MMHG | HEIGHT: 59 IN

## 2022-07-18 DIAGNOSIS — J02.9 SORE THROAT: Primary | ICD-10-CM

## 2022-07-18 DIAGNOSIS — R06.02 SHORTNESS OF BREATH: ICD-10-CM

## 2022-07-18 DIAGNOSIS — J06.9 ACUTE URI: ICD-10-CM

## 2022-07-18 LAB
EXPIRATION DATE: NORMAL
EXPIRATION DATE: NORMAL
FLUAV AG UPPER RESP QL IA.RAPID: NOT DETECTED
FLUBV AG UPPER RESP QL IA.RAPID: NOT DETECTED
INTERNAL CONTROL: NORMAL
INTERNAL CONTROL: NORMAL
Lab: NORMAL
Lab: NORMAL
S PYO AG THROAT QL: NEGATIVE
SARS-COV-2 AG UPPER RESP QL IA.RAPID: NOT DETECTED

## 2022-07-18 PROCEDURE — 87428 SARSCOV & INF VIR A&B AG IA: CPT | Performed by: FAMILY MEDICINE

## 2022-07-18 PROCEDURE — 87880 STREP A ASSAY W/OPTIC: CPT | Performed by: FAMILY MEDICINE

## 2022-07-18 PROCEDURE — 99213 OFFICE O/P EST LOW 20 MIN: CPT | Performed by: FAMILY MEDICINE

## 2022-07-18 RX ORDER — CROMOLYN SODIUM 5.2 MG
AEROSOL, SPRAY WITH PUMP (ML) NASAL
COMMUNITY
Start: 2022-07-16 | End: 2022-07-28 | Stop reason: SDDI

## 2022-07-18 RX ORDER — LORATADINE AND PSEUDOEPHEDRINE SULFATE 10; 240 MG/1; MG/1
1 TABLET, EXTENDED RELEASE ORAL DAILY
Qty: 14 TABLET | Refills: 0 | Status: SHIPPED | OUTPATIENT
Start: 2022-07-18 | End: 2022-08-23

## 2022-07-18 RX ORDER — DEXTROMETHORPHAN HYDROBROMIDE AND PROMETHAZINE HYDROCHLORIDE 15; 6.25 MG/5ML; MG/5ML
SYRUP ORAL
COMMUNITY
Start: 2022-07-14 | End: 2022-07-28

## 2022-07-18 NOTE — PROGRESS NOTES
"Chief Complaint  Nasal Congestion, Sore Throat, Cough, Laryngitis, and Shortness of Breath    Subjective          Enriqueta Gotti presents to [facility]  [facility]   This is a new problem. The current episode started in the past 7 days. The problem has been unchanged. Associated symptoms include congestion, coughing, headaches, rhinorrhea and a sore throat. She has tried acetaminophen and NSAIDs for the symptoms. The treatment provided mild relief.       Review of Systems   HENT: Positive for congestion, rhinorrhea and sore throat.    Respiratory: Positive for cough.    Neurological: Positive for headaches.         Objective   Vital Signs:   /82 (BP Location: Right arm, Patient Position: Sitting, Cuff Size: Large Adult)   Pulse 93   Temp 96.9 °F (36.1 °C) (Temporal)   Resp 16   Ht 149.9 cm (59\")   Wt 92.4 kg (203 lb 9.6 oz)   SpO2 98%   BMI 41.12 kg/m²     Physical Exam  Constitutional:       General: She is not in acute distress.     Appearance: Normal appearance. She is well-developed and well-groomed. She is not ill-appearing, toxic-appearing or diaphoretic.   HENT:      Head: Normocephalic.      Right Ear: Tympanic membrane, ear canal and external ear normal.      Left Ear: Tympanic membrane, ear canal and external ear normal.      Nose: Rhinorrhea present. No congestion.      Mouth/Throat:      Mouth: Mucous membranes are moist.      Pharynx: Oropharynx is clear. Posterior oropharyngeal erythema present. No oropharyngeal exudate.   Eyes:      General: Lids are normal.         Right eye: No discharge.         Left eye: No discharge.      Extraocular Movements: Extraocular movements intact.      Pupils: Pupils are equal, round, and reactive to light.   Neck:      Vascular: No carotid bruit.   Cardiovascular:      Rate and Rhythm: Normal rate and regular rhythm.      Pulses: Normal pulses.      Heart sounds: Normal heart sounds. No murmur heard.    No friction rub. " No gallop.   Pulmonary:      Effort: Pulmonary effort is normal. No respiratory distress.      Breath sounds: Normal breath sounds. No stridor. No wheezing, rhonchi or rales.   Chest:      Chest wall: No tenderness.   Abdominal:      General: Bowel sounds are normal. There is no distension.      Palpations: Abdomen is soft. There is no mass.      Tenderness: There is no abdominal tenderness. There is no right CVA tenderness, left CVA tenderness, guarding or rebound.      Hernia: No hernia is present.   Musculoskeletal:         General: No swelling or tenderness. Normal range of motion.      Cervical back: Normal range of motion and neck supple. No rigidity or tenderness.      Right lower leg: No edema.      Left lower leg: No edema.   Lymphadenopathy:      Cervical: No cervical adenopathy.   Skin:     General: Skin is warm.      Capillary Refill: Capillary refill takes less than 2 seconds.      Coloration: Skin is not jaundiced.      Findings: No bruising, erythema or rash.   Neurological:      General: No focal deficit present.      Mental Status: She is alert and oriented to person, place, and time.      Motor: Motor function is intact. No weakness.      Coordination: Coordination is intact.      Gait: Gait is intact. Gait normal.   Psychiatric:         Attention and Perception: Attention normal.         Mood and Affect: Mood normal.         Speech: Speech normal.         Behavior: Behavior normal.         Cognition and Memory: Cognition normal.         Judgment: Judgment normal.        Result Review :                 Assessment and Plan    Diagnoses and all orders for this visit:    1. Sore throat (Primary)  -     POCT rapid strep A  -     POCT SARS-CoV-2 Antigen ALINA + Flu    2. Shortness of breath  -     POCT rapid strep A  -     POCT SARS-CoV-2 Antigen ALINA + Flu    3. Acute URI  -     loratadine-pseudoephedrine (Claritin-D 24 Hour)  MG per 24 hr tablet; Take 1 tablet by mouth Daily.  Dispense: 14 tablet;  Refill: 0      Patient's Body mass index is 41.12 kg/m². indicating that she is morbidly obese (BMI > 40 or > 35 with obesity - related health condition). Obesity-related health conditions include the following: none. Obesity is unchanged. BMI is is above average; BMI management plan is completed. We discussed low calorie, low carb based diet program, portion control and increasing exercise..    Follow Up   Return if symptoms worsen or fail to improve, for Next scheduled follow up.  Patient was given instructions and counseling regarding her condition or for health maintenance advice. Please see specific information pulled into the AVS if appropriate.     This document has been electronically signed by DALILA Torres  July 19, 2022 09:21 EDT

## 2022-07-28 ENCOUNTER — OFFICE VISIT (OUTPATIENT)
Dept: FAMILY MEDICINE CLINIC | Facility: CLINIC | Age: 32
End: 2022-07-28

## 2022-07-28 VITALS
OXYGEN SATURATION: 98 % | HEIGHT: 59 IN | TEMPERATURE: 96.8 F | SYSTOLIC BLOOD PRESSURE: 110 MMHG | HEART RATE: 84 BPM | DIASTOLIC BLOOD PRESSURE: 80 MMHG | WEIGHT: 206.4 LBS | BODY MASS INDEX: 41.61 KG/M2

## 2022-07-28 DIAGNOSIS — E66.01 MORBID OBESITY: ICD-10-CM

## 2022-07-28 DIAGNOSIS — G89.18 PAIN FOLLOWING ORAL SURGERY: Primary | ICD-10-CM

## 2022-07-28 DIAGNOSIS — R11.14 BILIOUS VOMITING WITH NAUSEA: ICD-10-CM

## 2022-07-28 PROCEDURE — 99213 OFFICE O/P EST LOW 20 MIN: CPT | Performed by: NURSE PRACTITIONER

## 2022-07-28 RX ORDER — HYDROCODONE BITARTRATE AND ACETAMINOPHEN 7.5; 325 MG/1; MG/1
1 TABLET ORAL EVERY 6 HOURS PRN
COMMUNITY
End: 2022-07-29

## 2022-07-28 RX ORDER — ONDANSETRON 4 MG/1
4 TABLET, ORALLY DISINTEGRATING ORAL EVERY 8 HOURS PRN
Qty: 21 TABLET | Refills: 0 | Status: SHIPPED | OUTPATIENT
Start: 2022-07-28 | End: 2022-08-23

## 2022-07-28 RX ORDER — CLINDAMYCIN HYDROCHLORIDE 300 MG/1
300 CAPSULE ORAL 3 TIMES DAILY
COMMUNITY
End: 2022-08-09

## 2022-07-28 RX ORDER — IBUPROFEN 800 MG/1
800 TABLET ORAL EVERY 8 HOURS PRN
Qty: 90 TABLET | Refills: 0 | Status: SHIPPED | OUTPATIENT
Start: 2022-07-28 | End: 2022-08-23

## 2022-07-28 NOTE — PROGRESS NOTES
Chief Complaint  Pain and Vomiting    Subjective          Enriqueta Gotti is a 31 y.o. female who presents today to [facility] for follow up    HPI:   History of Present Illness    Presents to clinic for follow-up. Had oral surgery on Monday and is having pain and nausea. Threw up once today. Right lower cheek is swollen. Called surgeon and was told to do ibuprofen. Was sent home with clindamycin which she has only had 3 doses of. Denies any drainage. No other concerns or issues at this time.     The following portions of the patient's history were reviewed and updated as appropriate: allergies, current medications, past family history, past medical history, past social history, past surgical history and problem list.    Objective     Allergy:   Allergies   Allergen Reactions   • Codeine    • Penicillins    • Sulfa Antibiotics         Current Medications:   Current Outpatient Medications   Medication Sig Dispense Refill   • loratadine-pseudoephedrine (Claritin-D 24 Hour)  MG per 24 hr tablet Take 1 tablet by mouth Daily. 14 tablet 0   • ondansetron ODT (Zofran ODT) 4 MG disintegrating tablet Place 1 tablet on the tongue Every 8 (Eight) Hours As Needed for Nausea or Vomiting. 21 tablet 0   • ProAir  (90 Base) MCG/ACT inhaler INHALE 2 PUFFS BY MOUTH THREE TIMES DAILY AS NEEDED FOR 10 DAYS     • vilazodone (Viibryd) 20 MG tablet tablet Take 1 tablet by mouth Daily for 90 days. 90 tablet 0   • clindamycin (CLEOCIN) 300 MG capsule Take 300 mg by mouth 3 (Three) Times a Day.     • HYDROcodone-acetaminophen (NORCO) 7.5-325 MG per tablet Take 1 tablet by mouth Every 6 (Six) Hours As Needed for Moderate Pain .     • ibuprofen (ADVIL,MOTRIN) 800 MG tablet Take 1 tablet by mouth Every 8 (Eight) Hours As Needed for Mild Pain  or Moderate Pain . 90 tablet 0     No current facility-administered medications for this visit.       Past Medical History:  Past Medical History:  "  Diagnosis Date   • Ankle sprain     right   • Anxiety    • Bipolar disorder (HCC)    • Carpal tunnel syndrome    • Chronic pain disorder     Fibroymyalgia   • Depression    • Suicide attempt (HCC)    • Tear of meniscus of knee     left        Past Surgical History:  Past Surgical History:   Procedure Laterality Date   • CARPAL TUNNEL RELEASE     •  SECTION     • CHOLECYSTECTOMY     • LAPAROSCOPIC TUBAL LIGATION     • MOUTH SURGERY     • TONSILLECTOMY AND ADENOIDECTOMY     • TUBAL ABDOMINAL LIGATION         Social History:  Social History     Socioeconomic History   • Marital status:    Tobacco Use   • Smoking status: Current Every Day Smoker     Packs/day: 1.00     Years: 3.00     Pack years: 3.00     Types: Cigarettes     Start date: 2016   • Smokeless tobacco: Never Used   • Tobacco comment: Trying to quit.   Vaping Use   • Vaping Use: Never used   Substance and Sexual Activity   • Alcohol use: Yes     Comment: rarely   • Drug use: No   • Sexual activity: Defer       Family History:  Family History   Problem Relation Age of Onset   • Anxiety disorder Mother    • Bipolar disorder Mother    • Schizophrenia Brother    • Osteoarthritis Other    • Osteoporosis Other    • Heart disease Other    • Hypertension Other    • Asthma Other    • Diabetes Other    • Kidney disease Other    • Stroke Other    • ADD / ADHD Child        Review of Systems:  Review of Systems   Constitutional: Negative for chills and fever.       Vital Signs:   /80 (BP Location: Right arm, Patient Position: Sitting, Cuff Size: Large Adult)   Pulse 84   Temp 96.8 °F (36 °C) (Temporal)   Ht 149.9 cm (59\")   Wt 93.6 kg (206 lb 6.4 oz)   SpO2 98%   BMI 41.69 kg/m²  RR: 16    Physical Exam:  Physical Exam  Vitals and nursing note reviewed.   Constitutional:       General: She is not in acute distress.     Appearance: Normal appearance. She is obese. She is not ill-appearing or toxic-appearing.   HENT:      Head: " Normocephalic.      Mouth/Throat:      Comments: Right lower cheek with localized swelling. Swelling to right lower gum line.   Cardiovascular:      Rate and Rhythm: Normal rate and regular rhythm.      Heart sounds: Normal heart sounds.   Pulmonary:      Effort: Pulmonary effort is normal. No respiratory distress.      Breath sounds: Normal breath sounds.   Lymphadenopathy:      Cervical: No cervical adenopathy.   Skin:     General: Skin is warm and dry.      Coloration: Skin is not pale.   Neurological:      Mental Status: She is alert and oriented to person, place, and time.   Psychiatric:         Mood and Affect: Mood normal.         Behavior: Behavior normal.         Thought Content: Thought content normal.         Judgment: Judgment normal.                  Assessment and Plan   Diagnoses and all orders for this visit:    1. Pain following oral surgery (Primary)  -     ibuprofen (ADVIL,MOTRIN) 800 MG tablet; Take 1 tablet by mouth Every 8 (Eight) Hours As Needed for Mild Pain  or Moderate Pain .  Dispense: 90 tablet; Refill: 0  Warm and cold compressions as directed.  Ibuprofen as needed for pain and inflammation.  Continue to take antibiotics as prescribed.    2. Bilious vomiting with nausea  -     ondansetron ODT (Zofran ODT) 4 MG disintegrating tablet; Place 1 tablet on the tongue Every 8 (Eight) Hours As Needed for Nausea or Vomiting.  Dispense: 21 tablet; Refill: 0  Brat diet.  Regimen as above.    3. Morbid obesity (HCC)  Diet and lifestyle modifications to promote weight loss.      Discussed possible differential diagnoses, testing, treatment, recommended non-pharmacological interventions, risks, warning signs to monitor for that would indicate need for follow-up in clinic or ER. If no improvement with these regimens or you have new or worsening symptoms follow-up. Patient verbalizes understanding and agreement with plan of care. Denies further needs or concerns.     Patient was given instructions and  counseling regarding her condition and for health maintenance advised.    Class 3 Severe Obesity (BMI >=40). Obesity-related health conditions include the following: obstructive sleep apnea, hypertension, coronary heart disease, diabetes mellitus, dyslipidemias, GERD and peripheral vascular disease. Obesity is worsening. BMI is is above average; BMI management plan is completed. We discussed portion control and increasing exercise.       I spent 20 minutes caring for patient on this date of service. This time includes time spent by me in the following activities: preparing for the visit, reviewing tests, obtaining and/or reviewing a separately obtained history, performing a medically appropriate examination and/or evaluation, counseling and educating the patient/family/caregiver, ordering medications, tests, or procedures and documenting information in the medical record    Meds ordered during this visit:  New Medications Ordered This Visit   Medications   • ondansetron ODT (Zofran ODT) 4 MG disintegrating tablet     Sig: Place 1 tablet on the tongue Every 8 (Eight) Hours As Needed for Nausea or Vomiting.     Dispense:  21 tablet     Refill:  0   • ibuprofen (ADVIL,MOTRIN) 800 MG tablet     Sig: Take 1 tablet by mouth Every 8 (Eight) Hours As Needed for Mild Pain  or Moderate Pain .     Dispense:  90 tablet     Refill:  0       Patient Instructions:  Patient instructions given for the following visit diagnosis:    ICD-10-CM ICD-9-CM   1. Pain following oral surgery  G89.18 528.9     338.18   2. Bilious vomiting with nausea  R11.14 787.04   3. Morbid obesity (HCC)  E66.01 278.01       Follow Up   Return if symptoms worsen or fail to improve.        This document has been electronically signed by DALILA Juan  July 28, 2022 14:48 EDT    Patient was given instructions and counseling regarding her condition or for health maintenance advice. Please see specific information pulled into the AVS if appropriate.      Part of this note may be an electronic transcription/translation of spoken language to printed text using the Dragon Dictation System.

## 2022-07-29 ENCOUNTER — OFFICE VISIT (OUTPATIENT)
Dept: FAMILY MEDICINE CLINIC | Facility: CLINIC | Age: 32
End: 2022-07-29

## 2022-07-29 VITALS
OXYGEN SATURATION: 98 % | SYSTOLIC BLOOD PRESSURE: 120 MMHG | TEMPERATURE: 96.6 F | HEIGHT: 59 IN | HEART RATE: 112 BPM | DIASTOLIC BLOOD PRESSURE: 70 MMHG | WEIGHT: 206 LBS | BODY MASS INDEX: 41.53 KG/M2

## 2022-07-29 DIAGNOSIS — G89.18 PAIN FOLLOWING ORAL SURGERY: Primary | ICD-10-CM

## 2022-07-29 DIAGNOSIS — R11.14 BILIOUS VOMITING WITH NAUSEA: ICD-10-CM

## 2022-07-29 PROCEDURE — 96372 THER/PROPH/DIAG INJ SC/IM: CPT | Performed by: NURSE PRACTITIONER

## 2022-07-29 PROCEDURE — 99213 OFFICE O/P EST LOW 20 MIN: CPT | Performed by: NURSE PRACTITIONER

## 2022-07-29 RX ORDER — KETOROLAC TROMETHAMINE 30 MG/ML
60 INJECTION, SOLUTION INTRAMUSCULAR; INTRAVENOUS ONCE
Status: COMPLETED | OUTPATIENT
Start: 2022-07-29 | End: 2022-07-29

## 2022-07-29 RX ORDER — TRAMADOL HYDROCHLORIDE 50 MG/1
50 TABLET ORAL EVERY 8 HOURS PRN
Qty: 9 TABLET | Refills: 0 | Status: SHIPPED | OUTPATIENT
Start: 2022-07-29 | End: 2022-08-01

## 2022-07-29 RX ADMIN — KETOROLAC TROMETHAMINE 60 MG: 30 INJECTION, SOLUTION INTRAMUSCULAR; INTRAVENOUS at 14:42

## 2022-07-29 NOTE — PROGRESS NOTES
Chief Complaint  Pain (Mouth )    Subjective          Enriqueta Gotti is a 31 y.o. female who presents today to [facility] for follow up    HPI:   History of Present Illness      Presents to clinic for follow-up. Had oral surgery on Monday. Had all teeth removed as reports had infection in her roots. Was seen in clinic for pain and nausea yesterday and treated with ibuprofen and Zofran.  Today patient reports nausea is better but still having pain. Is not able to sleep or eat due to the pain.       The following portions of the patient's history were reviewed and updated as appropriate: allergies, current medications, past family history, past medical history, past social history, past surgical history and problem list.    Objective     Allergy:   Allergies   Allergen Reactions   • Codeine    • Penicillins    • Sulfa Antibiotics         Current Medications:   Current Outpatient Medications   Medication Sig Dispense Refill   • clindamycin (CLEOCIN) 300 MG capsule Take 300 mg by mouth 3 (Three) Times a Day.     • ibuprofen (ADVIL,MOTRIN) 800 MG tablet Take 1 tablet by mouth Every 8 (Eight) Hours As Needed for Mild Pain  or Moderate Pain . 90 tablet 0   • loratadine-pseudoephedrine (Claritin-D 24 Hour)  MG per 24 hr tablet Take 1 tablet by mouth Daily. 14 tablet 0   • ondansetron ODT (Zofran ODT) 4 MG disintegrating tablet Place 1 tablet on the tongue Every 8 (Eight) Hours As Needed for Nausea or Vomiting. 21 tablet 0   • ProAir  (90 Base) MCG/ACT inhaler INHALE 2 PUFFS BY MOUTH THREE TIMES DAILY AS NEEDED FOR 10 DAYS     • vilazodone (Viibryd) 20 MG tablet tablet Take 1 tablet by mouth Daily for 90 days. 90 tablet 0   • traMADol (ULTRAM) 50 MG tablet Take 1 tablet by mouth Every 8 (Eight) Hours As Needed for Moderate Pain  for up to 3 days. 9 tablet 0     No current facility-administered medications for this visit.       Past Medical History:  Past Medical History:  "  Diagnosis Date   • Ankle sprain     right   • Anxiety    • Bipolar disorder (HCC)    • Carpal tunnel syndrome    • Chronic pain disorder     Fibroymyalgia   • Depression    • Suicide attempt (HCC)    • Tear of meniscus of knee     left        Past Surgical History:  Past Surgical History:   Procedure Laterality Date   • CARPAL TUNNEL RELEASE     •  SECTION     • CHOLECYSTECTOMY     • LAPAROSCOPIC TUBAL LIGATION     • MOUTH SURGERY     • TONSILLECTOMY AND ADENOIDECTOMY     • TUBAL ABDOMINAL LIGATION         Social History:  Social History     Socioeconomic History   • Marital status:    Tobacco Use   • Smoking status: Current Every Day Smoker     Packs/day: 1.00     Years: 3.00     Pack years: 3.00     Types: Cigarettes     Start date: 2016   • Smokeless tobacco: Never Used   • Tobacco comment: Trying to quit.   Vaping Use   • Vaping Use: Never used   Substance and Sexual Activity   • Alcohol use: Yes     Comment: rarely   • Drug use: No   • Sexual activity: Defer       Family History:  Family History   Problem Relation Age of Onset   • Anxiety disorder Mother    • Bipolar disorder Mother    • Schizophrenia Brother    • Osteoarthritis Other    • Osteoporosis Other    • Heart disease Other    • Hypertension Other    • Asthma Other    • Diabetes Other    • Kidney disease Other    • Stroke Other    • ADD / ADHD Child        Review of Systems:  Review of Systems   Constitutional: Negative for chills and fever.       Vital Signs:   /70 (BP Location: Left arm, Patient Position: Sitting, Cuff Size: Large Adult)   Pulse 112   Temp 96.6 °F (35.9 °C) (Temporal)   Ht 149.9 cm (59\")   Wt 93.4 kg (206 lb)   SpO2 98%   BMI 41.61 kg/m²  RR: 16    Physical Exam:  Physical Exam  Vitals and nursing note reviewed.   Constitutional:       General: She is not in acute distress.     Appearance: Normal appearance. She is obese. She is not ill-appearing or toxic-appearing.   HENT:      Head: Normocephalic. "      Mouth/Throat:      Comments: Right lower cheek with localized swelling. Swelling to right lower gum line. Slightly improved from yesterday.   Cardiovascular:      Rate and Rhythm: Normal rate and regular rhythm.      Heart sounds: Normal heart sounds.   Pulmonary:      Effort: Pulmonary effort is normal. No respiratory distress.      Breath sounds: Normal breath sounds.   Lymphadenopathy:      Cervical: No cervical adenopathy.   Skin:     General: Skin is warm and dry.      Coloration: Skin is not pale.   Neurological:      Mental Status: She is alert and oriented to person, place, and time.   Psychiatric:         Mood and Affect: Mood normal.         Behavior: Behavior normal.         Thought Content: Thought content normal.         Judgment: Judgment normal.                  Assessment and Plan   Diagnoses and all orders for this visit:    1. Pain following oral surgery (Primary)  Ketorlac injection given today in clinic. Tramadol, as prescribed. Warm and cold compressions as directed.  Ibuprofen as needed for pain and inflammation.  Continue to take antibiotics as prescribed.    2. Bilious vomiting with nausea  Brat diet.  Regimen as above.    Keron # in epic  Reviewed and is consistent.    UDS obtained today    As part of the patient's treatment plan they are being prescribed a controlled substance/ substances with potential for abuse.  This patient has been made aware of the appropriate use of such medications, including potential risk of somnolence, limited ability to drive and/or work safely, and potential for overdose.  It has also been made clear these medications are for use by the patient only, without concomitant use of alcohol or other substances unless prescribed/advised by medical provider.       Patient has completed prescribing agreement detailing terms of continued prescribing of controlled substances including monitoring KERON reports, urine drug screens, and pill counts.  The patient is  dave CABRALES reports are reviewed on a regular basis and scanned into the chart.      History and physical exam exhibit continued safe and appropriate use of controlled substances.    Discussed possible differential diagnoses, testing, treatment, recommended non-pharmacological interventions, risks, warning signs to monitor for that would indicate need for follow-up in clinic or ER. If no improvement with these regimens or you have new or worsening symptoms follow-up. Patient verbalizes understanding and agreement with plan of care. Denies further needs or concerns.     Patient was given instructions and counseling regarding her condition and for health maintenance advised.    I spent 20 minutes caring for patient on this date of service. This time includes time spent by me in the following activities: preparing for the visit, reviewing tests, obtaining and/or reviewing a separately obtained history, performing a medically appropriate examination and/or evaluation, counseling and educating the patient/family/caregiver, ordering medications, tests, or procedures and documenting information in the medical record    Meds ordered during this visit:  New Medications Ordered This Visit   Medications   • ketorolac (TORADOL) injection 60 mg   • traMADol (ULTRAM) 50 MG tablet     Sig: Take 1 tablet by mouth Every 8 (Eight) Hours As Needed for Moderate Pain  for up to 3 days.     Dispense:  9 tablet     Refill:  0       Patient Instructions:  Patient instructions given for the following visit diagnosis:    ICD-10-CM ICD-9-CM   1. Pain following oral surgery  G89.18 528.9     338.18   2. Bilious vomiting with nausea  R11.14 787.04       Follow Up   Return for Call and f/u with your dental surgeon today. F/u in clinic PRN. .        This document has been electronically signed by DALILA Juan  July 29, 2022 14:48 EDT    Patient was given instructions and counseling regarding her condition or for health maintenance advice.  Please see specific information pulled into the AVS if appropriate.     Part of this note may be an electronic transcription/translation of spoken language to printed text using the Dragon Dictation System.

## 2022-08-09 ENCOUNTER — OFFICE VISIT (OUTPATIENT)
Dept: FAMILY MEDICINE CLINIC | Facility: CLINIC | Age: 32
End: 2022-08-09

## 2022-08-09 VITALS — TEMPERATURE: 98.6 F | HEART RATE: 100 BPM | OXYGEN SATURATION: 99 %

## 2022-08-09 DIAGNOSIS — J01.10 ACUTE NON-RECURRENT FRONTAL SINUSITIS: Primary | ICD-10-CM

## 2022-08-09 PROCEDURE — 99213 OFFICE O/P EST LOW 20 MIN: CPT | Performed by: FAMILY MEDICINE

## 2022-08-09 RX ORDER — METHYLPREDNISOLONE 4 MG/1
TABLET ORAL
Qty: 1 EACH | Refills: 0 | Status: SHIPPED | OUTPATIENT
Start: 2022-08-09 | End: 2022-08-23

## 2022-08-09 RX ORDER — AZITHROMYCIN 250 MG/1
TABLET, FILM COATED ORAL
Qty: 6 TABLET | Refills: 0 | Status: SHIPPED | OUTPATIENT
Start: 2022-08-09 | End: 2022-08-23

## 2022-08-09 NOTE — PROGRESS NOTES
Chief Complaint  Cough    Subjective          Enriqueta Gotti presents to [facility]  Sinusitis  This is a recurrent problem. The current episode started 1 to 4 weeks ago. The problem has been gradually worsening since onset. The maximum temperature recorded prior to her arrival was 101 - 101.9 F. The fever has been present for 1 to 2 days. The pain is moderate. Associated symptoms include congestion, coughing, headaches, sinus pressure and a sore throat. Past treatments include acetaminophen, sitting up, saline sprays and oral decongestants. The treatment provided mild relief.       Review of Systems   HENT: Positive for congestion, sinus pressure and sore throat.    Respiratory: Positive for cough.    Neurological: Positive for headaches.         Objective   Vital Signs:   Pulse 100   Temp 98.6 °F (37 °C) (Oral)   SpO2 99%     Physical Exam  Constitutional:       General: She is not in acute distress.     Appearance: Normal appearance. She is well-developed and well-groomed. She is not ill-appearing, toxic-appearing or diaphoretic.   HENT:      Head: Normocephalic.      Nose: Nose normal. No congestion or rhinorrhea.      Mouth/Throat:      Mouth: Mucous membranes are moist.      Pharynx: Oropharynx is clear. No oropharyngeal exudate or posterior oropharyngeal erythema.   Eyes:      General: Lids are normal.         Right eye: No discharge.         Left eye: No discharge.      Extraocular Movements: Extraocular movements intact.      Pupils: Pupils are equal, round, and reactive to light.   Neck:      Vascular: No carotid bruit.   Cardiovascular:      Rate and Rhythm: Normal rate and regular rhythm.      Pulses: Normal pulses.      Heart sounds: Normal heart sounds. No murmur heard.    No friction rub. No gallop.   Pulmonary:      Effort: Pulmonary effort is normal. No respiratory distress.      Breath sounds: Normal breath sounds. No stridor. No wheezing, rhonchi or  rales.   Chest:      Chest wall: No tenderness.   Abdominal:      General: Bowel sounds are normal. There is no distension.      Palpations: Abdomen is soft. There is no mass.      Tenderness: There is no abdominal tenderness. There is no right CVA tenderness, left CVA tenderness, guarding or rebound.      Hernia: No hernia is present.   Musculoskeletal:         General: No swelling or tenderness. Normal range of motion.      Cervical back: Normal range of motion and neck supple. No rigidity or tenderness.      Right lower leg: No edema.      Left lower leg: No edema.   Lymphadenopathy:      Cervical: No cervical adenopathy.   Skin:     General: Skin is warm.      Capillary Refill: Capillary refill takes less than 2 seconds.      Coloration: Skin is not jaundiced.      Findings: No bruising, erythema or rash.   Neurological:      General: No focal deficit present.      Mental Status: She is alert and oriented to person, place, and time.      Motor: Motor function is intact. No weakness.      Coordination: Coordination is intact.      Gait: Gait is intact. Gait normal.   Psychiatric:         Attention and Perception: Attention normal.         Mood and Affect: Mood normal.         Speech: Speech normal.         Behavior: Behavior normal.         Cognition and Memory: Cognition normal.         Judgment: Judgment normal.        Result Review :                 Assessment and Plan    Diagnoses and all orders for this visit:    1. Acute non-recurrent frontal sinusitis (Primary)  -     azithromycin (Zithromax Z-Petros) 250 MG tablet; Take 2 tablets by mouth on day 1, then 1 tablet daily on days 2-5  Dispense: 6 tablet; Refill: 0  -     methylPREDNISolone (MEDROL) 4 MG dose pack; Take as directed on package instructions.  Dispense: 1 each; Refill: 0      Patient's There is no height or weight on file to calculate BMI. indicating that she is morbidly obese (BMI > 40 or > 35 with obesity - related health condition).  Obesity-related health conditions include the following: none. Obesity is unchanged. BMI is is above average; BMI management plan is completed. We discussed low calorie, low carb based diet program, portion control and increasing exercise..    Follow Up   Return if symptoms worsen or fail to improve, for Next scheduled follow up.  Patient was given instructions and counseling regarding her condition or for health maintenance advice. Please see specific information pulled into the AVS if appropriate.     This document has been electronically signed by DALILA Torres  August 9, 2022 13:36 EDT

## 2022-08-23 ENCOUNTER — OFFICE VISIT (OUTPATIENT)
Dept: PSYCHIATRY | Facility: CLINIC | Age: 32
End: 2022-08-23

## 2022-08-23 VITALS
WEIGHT: 204.4 LBS | OXYGEN SATURATION: 97 % | SYSTOLIC BLOOD PRESSURE: 117 MMHG | BODY MASS INDEX: 41.2 KG/M2 | DIASTOLIC BLOOD PRESSURE: 83 MMHG | HEIGHT: 59 IN | TEMPERATURE: 97.5 F | HEART RATE: 102 BPM

## 2022-08-23 DIAGNOSIS — F90.0 ADHD (ATTENTION DEFICIT HYPERACTIVITY DISORDER), INATTENTIVE TYPE: Primary | ICD-10-CM

## 2022-08-23 PROCEDURE — 99215 OFFICE O/P EST HI 40 MIN: CPT

## 2022-08-23 RX ORDER — METHYLPHENIDATE HYDROCHLORIDE 5 MG/1
5 TABLET ORAL 2 TIMES DAILY
Qty: 60 TABLET | Refills: 0 | Status: SHIPPED | OUTPATIENT
Start: 2022-08-23 | End: 2022-10-05

## 2022-08-23 RX ORDER — CLONIDINE HYDROCHLORIDE 0.1 MG/1
0.1 TABLET ORAL NIGHTLY
Qty: 30 TABLET | Refills: 0 | Status: SHIPPED | OUTPATIENT
Start: 2022-08-23 | End: 2022-09-14 | Stop reason: SDUPTHER

## 2022-08-23 NOTE — PROGRESS NOTES
"Subjective   Enriqueta Gotti is a 31 y.o. female who is here today for medication management follow-up appointment.    Chief Complaint: Anxiety    HPI:  History of Present Illness     Patient denies negative side effects of current regimen.  Patient reports that she has recovered from previous dental infection, tooth extraction.  Identifies that since last encounter she has had variation of viral illnesses but is beginning to recover.  She reports that she and her  are starting therapy together and reports that he is now seeking treatment for himself and identifies this as encouraging and positive.  Mood is been vastly unchanged.  Any remains heightened in times to where she feels overwhelmed and finds herself often referencing what if and catastrophic outcomes.  Continues to endorse fatigue, intermittent sadness, low concentration.  Patient reports that she is not crying as much as she was but she is still feeling \"very overwhelmed as everything is on me.\"  Sleep initiation, duration, quality continues to identify 5 to 6 hours per night.  She denies any nightmares.  Appetite is starting to improve.  Approximates 2-3 meals per day.Body mass index is 41.26 kg/m².  Continue difficulty with forgetfulness, disorganization, task initiation/task completion, procrastination, distractibility.  PTSD symptomology remains present and unchanged.  Denies any self-harm behaviors.  Denies AVH.  Patient adamantly denies SI and HI.    Past Psych History: Inpatient admission x3; described above.  Patient reports previous outpatient medication management through [facility], [facility], most recent [facility].  Patient denies a history of self-harm.  Patient denies a history of TBI or seizures.  Patient reports previous diagnosis of anxiety, depression, bipolar disorder.    Previous Psych Meds: Patient reports that she has trialed several medications in the past but is unsure of them all.  She does endorse that " she had negative side effects with Effexor, Zoloft, Depakote, and Wellbutrin.  She does identify that she has previously trialed Invega but experienced an increase in prolactin levels related to this. Gene site testing was conducted at the [facility].    Substance Abuse: Patient denies history or current illicit substance use, EtOH.  She does endorse daily nicotine use in the form of cigarettes x13 years approximating 0.5 to 1 pack/day with last reported use being today.  Patient does report caffeine intake approximating 16 to 32 ounces daily.    Social History: Patient identifies that she was born and raised in [place] until she was in second grade.  Patient reports that primary custody until she was in  was to her biological father.  Identifies a strained relationship with biological mother.  Patient identifies that she was raised by paternal grandparents and adopted.  She identifies that after leaving [place] that she moved to [place], [place] then to [place], [place] then to [place].  She identifies that she then moved to [facility], [place] x1 year.   3 times total; however, 2 marriages to the same person.  She identifies first marriage x10 months.  Second marriage 1 year.  Third marriage x3-1/2 years.  Endorses that first and third marriage is to current .  Reports previous relationships between marriage courses.  2 children.  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  High school graduate, some college.  Denies any previous or pending legal matters.      Family Psychiatric History:  family history includes ADD / ADHD in her child; Anxiety disorder in her mother; Asthma in an other family member; Bipolar disorder in her mother; Diabetes in an other family member; Heart disease in an other family member; Hypertension in an other family member; Kidney disease in an other family member; Osteoarthritis in an other family member; Osteoporosis in an other family  member; Schizophrenia in her brother; Stroke in an other family member.    Medical/Surgical History:  Past Medical History:   Diagnosis Date   • Ankle sprain     right   • Anxiety    • Bipolar disorder (HCC)    • Carpal tunnel syndrome    • Chronic pain disorder     Fibroymyalgia   • Depression    • Suicide attempt (HCC)    • Tear of meniscus of knee     left      Past Surgical History:   Procedure Laterality Date   • CARPAL TUNNEL RELEASE     •  SECTION     • CHOLECYSTECTOMY     • LAPAROSCOPIC TUBAL LIGATION     • MOUTH SURGERY     • TONSILLECTOMY AND ADENOIDECTOMY     • TUBAL ABDOMINAL LIGATION         Allergies   Allergen Reactions   • Codeine    • Penicillins    • Sulfa Antibiotics            Current Medications:   Current Outpatient Medications   Medication Sig Dispense Refill   • cloNIDine (Catapres) 0.1 MG tablet Take 1 tablet by mouth Every Night. 30 tablet 0   • methylphenidate (RITALIN) 5 MG tablet Take 1 tablet by mouth 2 (Two) Times a Day. 60 tablet 0   • ProAir  (90 Base) MCG/ACT inhaler INHALE 2 PUFFS BY MOUTH THREE TIMES DAILY AS NEEDED FOR 10 DAYS     • vilazodone (Viibryd) 20 MG tablet tablet Take 1 tablet by mouth Daily for 90 days. 90 tablet 0     No current facility-administered medications for this visit.         Review of Systems   Constitutional: Positive for activity change, appetite change and fatigue.   HENT: Negative for dental problem, drooling, ear pain and sore throat.    Eyes: Negative for redness and visual disturbance.   Respiratory: Negative for chest tightness.    Cardiovascular: Negative for chest pain and palpitations.   Gastrointestinal: Negative for abdominal pain, diarrhea and nausea.   Endocrine: Negative for polydipsia and polyuria.   Genitourinary: Negative for frequency and urgency.   Musculoskeletal: Negative for back pain and gait problem.   Skin: Negative for pallor and rash.   Allergic/Immunologic: Negative for environmental allergies and  "immunocompromised state.   Neurological: Negative for dizziness, tremors, seizures, syncope, facial asymmetry, speech difficulty, weakness, light-headedness, numbness and headaches.   Hematological: Negative for adenopathy. Does not bruise/bleed easily.   Psychiatric/Behavioral: Positive for decreased concentration, dysphoric mood and sleep disturbance. Negative for agitation, behavioral problems, confusion, hallucinations, self-injury and suicidal ideas. The patient is nervous/anxious. The patient is not hyperactive.     denies HEENT, cardiovascular, respiratory, liver, renal, GI/, endocrine, neuro, DERM, hematology, immunology, musculoskeletal disorders.    Objective   Physical Exam  Vitals reviewed.   Constitutional:       Appearance: Normal appearance. She is obese.   HENT:      Head: Normocephalic.      Nose: Nose normal.      Mouth/Throat:      Mouth: Mucous membranes are moist.      Pharynx: Oropharynx is clear.   Eyes:      Extraocular Movements: Extraocular movements intact.      Pupils: Pupils are equal, round, and reactive to light.   Cardiovascular:      Rate and Rhythm: Normal rate.   Pulmonary:      Effort: Pulmonary effort is normal.   Musculoskeletal:         General: Normal range of motion.      Cervical back: Normal range of motion.   Skin:     General: Skin is warm and dry.   Neurological:      General: No focal deficit present.      Mental Status: She is alert and oriented to person, place, and time. Mental status is at baseline.   Psychiatric:         Attention and Perception: Attention and perception normal.         Mood and Affect: Mood is anxious. Affect is tearful.         Speech: Speech normal.         Behavior: Behavior normal. Behavior is cooperative.         Thought Content: Thought content normal.         Cognition and Memory: Cognition and memory normal.         Judgment: Judgment normal.       Blood pressure 117/83, pulse 102, temperature 97.5 °F (36.4 °C), height 149.9 cm (59.02\"), " weight 92.7 kg (204 lb 6.4 oz), SpO2 97 %.    Mental Status Exam:   Hygiene:   good  Cooperation:  Cooperative  Eye Contact:  Good  Psychomotor Behavior:  Restless  Affect:  Full range and Appropriate  Hopelessness: Denies  Speech:  Normal  Thought Process:  Goal directed and Linear  Thought Content:  Normal and Mood congruent  Suicidal:  None  Homicidal:  None  Hallucinations:  None  Delusion:  None  Memory:  Intact  Orientation:  Person, Place, Time and Situation  Reliability:  fair  Insight:  Fair  Judgement:  Fair  Impulse Control:  Fair  Physical/Medical Issues:  No       Short-term goals: Patient will be compliant with clinic appointments.  Patient will be engaged in therapy, medication compliant with minimal side effects. Patient  will report decrease of symptoms and frequency.    Long-term goals: Patient will have minimal symptoms of  with continued medication management. Patient will be compliant with treatment and appointments.     Strengths: Motivation for treatment, insight  Weaknesses: Support, strained family dynamics, recent loss    Functional Status: moderate impairment in areas of daily functioning.  Prognosis: Guarded dependent on medication/follow up and treatment plan compliance.    Enriqueta Gotti  reports that she has been smoking cigarettes. She started smoking about 6 years ago. She has a 3.00 pack-year smoking history. She has never used smokeless tobacco.. I have educated her on the risk of diseases from using tobacco products such as cancer, COPD, heart disease and reproductive problems.     I advised her to quit and she is not willing to quit.    I spent 3  minutes counseling the patient.    Assessment & Plan   Diagnoses and all orders for this visit:    1. ADHD (attention deficit hyperactivity disorder), inattentive type (Primary)  -     methylphenidate (RITALIN) 5 MG tablet; Take 1 tablet by mouth 2 (Two) Times a Day.  Dispense: 60 tablet; Refill: 0  -     cloNIDine (Catapres)  0.1 MG tablet; Take 1 tablet by mouth Every Night.  Dispense: 30 tablet; Refill: 0      -Elieser reviewed and appropriate for substances described by patient and timeline.  -Continue Viibryd 20 mg p.o. daily for anxiety and depression  -Start Ritalin 5 mg p.o. twice daily for attention and focus  -Recommend psychotherapy with LCSW  -Medications sent to pharmacy at this time    Discussed medication and psychotherapy options.  I am going to start low-dose trial of Ritalin to target attention, focus, and impulsivity complaints.  I am going to continue Viibryd without change.  I am going to start clonidine 0.1 mg p.o. nightly for ADHD, anxiety, and sleep. UDS obtained at today's encounter in will be reviewed once available.  I have discussed with patient that stimulants could potentially be activating and could potentiate occurrence of ab and/or hypomania if there is an underlying concern for bipolar diagnosis.  Based on symptomology chronic and acute described by patient throughout her encounters continue to identify suspicion of ADHD and unipolar depression.  Previous CPT resulted in for atypical T-scores identifying difficulty with inattention and impulsivity.  Patient was instructed to keep medication in secure place.  The use of energy drinks and decongestants while taking the medication was discouraged.  Informed the medication has abuse potential and can be habit forming was discussed. The dangers of stimulant use including elevated heart rate and blood pressure was disclosed.   Patient verbalized understanding and wishes to proceed. As part of the patient's treatment plan they are being prescribed a controlled substance with potential for abuse.  The patient has been made aware of the appropriate use of such medications,  It has been made clear these medications are for use by the patient only, without concomitant use of alcohol or other substances unless prescribed/advised by medical provider. Patient has  completed prescribing agreement detailing term of continued prescribing of controlled substances including monitoring KERON reports, urine drug screens, and pill counts.  The patient is aware KERON reports are reviewed on a regular basis and scanned into the chart. Patient is aware the medication has abuse potential. I've explained to her that drugs of the SSRI/SNRI/modulators class can have side effects such as weight gain, sexual dysfunction, insomnia, headache, nausea.  I discussed that alpha agonist have potential to lower blood pressure, lower heart rate, cause dry mouth, etc. discussed the risks, benefits, and side effects of the medication; client acknowledged and verbally consented.  Patient is aware to contact the [facility] with any worsening of symptom.  Patient is agreeable to go to the ER or call 911 should they begin SI/HI.    SUPPORTIVE PSYCHOTHERAPY: continuing efforts to promote the therapeutic alliance, address the patient’s issues, and strengthen self awareness, insights, and coping skills.  Assisted patient in processing above session content; acknowledged and normalized patient’s thoughts, feelings, and concerns.  Applied  positive coping skills and behavior management in session.  Allowed patient to freely discuss issues without interruption or judgment. Provided safe, confidential environment to facilitate the development of positive therapeutic relationship and encourage open, honest communication. Assisted patient in identifying risk factors which would indicate the need for higher level of care including thoughts to harm self or others and/or self-harming behavior and encouraged patient to contact this office, call 911, or present to the nearest emergency room should any of these events occur. Discussed crisis intervention services and means to access.  Patient adamantly and convincingly denies current suicidal or homicidal ideation or perceptual disturbance.    Return in about 4  weeks (around 9/20/2022), or if symptoms worsen or fail to improve, for Next scheduled follow up.      This document has been electronically signed by DALILA Guthrie   August 23, 2022 12:56 EDT   Errors in dictation may reflect use of voice recognition software and not all errors in transcription may have been detected prior to signing.      I spent a total of 52 minutes face-to-face with patient in psychotherapy as described above, diagnosis, medication options/potential side effects, and ordering medications.

## 2022-09-14 DIAGNOSIS — F90.0 ADHD (ATTENTION DEFICIT HYPERACTIVITY DISORDER), INATTENTIVE TYPE: ICD-10-CM

## 2022-09-14 DIAGNOSIS — F41.1 GENERALIZED ANXIETY DISORDER: ICD-10-CM

## 2022-09-14 DIAGNOSIS — F33.1 MAJOR DEPRESSIVE DISORDER, RECURRENT, MODERATE: ICD-10-CM

## 2022-09-14 RX ORDER — CLONIDINE HYDROCHLORIDE 0.1 MG/1
0.1 TABLET ORAL NIGHTLY
Qty: 30 TABLET | Refills: 0 | Status: SHIPPED | OUTPATIENT
Start: 2022-09-14 | End: 2022-10-05 | Stop reason: SDUPTHER

## 2022-09-14 RX ORDER — VILAZODONE HYDROCHLORIDE 20 MG/1
20 TABLET ORAL DAILY
Qty: 90 TABLET | Refills: 0 | Status: SHIPPED | OUTPATIENT
Start: 2022-09-14 | End: 2022-12-14 | Stop reason: SDUPTHER

## 2022-09-14 RX ORDER — METHYLPHENIDATE HYDROCHLORIDE 5 MG/1
5 TABLET ORAL 2 TIMES DAILY
Qty: 60 TABLET | Refills: 0 | OUTPATIENT
Start: 2022-09-14

## 2022-09-14 NOTE — TELEPHONE ENCOUNTER
Ritalin request is too soon. Tell her that she will need to call  to fill date.     Patient notified & verbalized understanding.

## 2022-10-05 ENCOUNTER — OFFICE VISIT (OUTPATIENT)
Dept: PSYCHIATRY | Facility: CLINIC | Age: 32
End: 2022-10-05

## 2022-10-05 VITALS
HEART RATE: 106 BPM | SYSTOLIC BLOOD PRESSURE: 117 MMHG | DIASTOLIC BLOOD PRESSURE: 83 MMHG | OXYGEN SATURATION: 97 % | TEMPERATURE: 97.7 F | HEIGHT: 59 IN | BODY MASS INDEX: 41 KG/M2 | WEIGHT: 203.4 LBS

## 2022-10-05 DIAGNOSIS — F90.0 ADHD (ATTENTION DEFICIT HYPERACTIVITY DISORDER), INATTENTIVE TYPE: ICD-10-CM

## 2022-10-05 DIAGNOSIS — F41.1 GENERALIZED ANXIETY DISORDER: Primary | ICD-10-CM

## 2022-10-05 PROCEDURE — 99214 OFFICE O/P EST MOD 30 MIN: CPT

## 2022-10-05 RX ORDER — METHYLPHENIDATE HYDROCHLORIDE 18 MG/1
18 TABLET ORAL DAILY
Qty: 30 TABLET | Refills: 0 | Status: SHIPPED | OUTPATIENT
Start: 2022-10-05 | End: 2022-11-15 | Stop reason: SDUPTHER

## 2022-10-05 RX ORDER — CLONIDINE HYDROCHLORIDE 0.1 MG/1
0.1 TABLET ORAL NIGHTLY
Qty: 90 TABLET | Refills: 0 | Status: SHIPPED | OUTPATIENT
Start: 2022-10-05 | End: 2022-11-15 | Stop reason: SDUPTHER

## 2022-10-05 NOTE — PROGRESS NOTES
"Subjective   Enriqueta Gotti is a 31 y.o. female who is here today for medication management follow-up appointment.    Chief Complaint: Anxiety.  ADHD    HPI:  History of Present Illness     Patient denies negative side effects of current regimen.  Patient reports reduction in life stressors since last encounter.  Patient reports that she has  from her  and feels that this has been a positive impact on her mood.  Patient has started working and feels proud of herself at this.  Patient reports that her support system is well and she is working on initiating maintaining a relationship with her biological mom.  Patient reports that she has noticed that her \"mind is slower and I am not as anxious when I remember to take my medicine.\"  She identifies that she has felt not as \"overwhelmed.\"  She identifies that she has been able to maintain routine for her and her son more easily.  She reports that organization has improved slightly but is still problematic.  Sadness has reduced and feels that enjoyment is more prominent.  Reports that sleep is improved and is approximating 6 hours per night without awakenings or nightmares.  Reports that she has noticed the most improvement with sleep initiation.  Appetite has improved and is approximating 2-3 meals per day. Body mass index is 41.06 kg/m².  Forgetfulness is improved with medication.  Task initiation and completion improved with medication.  Reports that she is going to bring a form into the office to allow for accommodations to be made for her at her new job to allow for standing desk and extended cords for movement.  PTSD symptomology remains present unchanged.  Denies self-harm.  Denies AVH.  Denies SI and HI.    Past Psych History: Inpatient admission x3; described above.  Patient reports previous outpatient medication management through [facility], [facility], most recent [facility].  Patient denies a history of self-harm.  Patient " denies a history of TBI or seizures.  Patient reports previous diagnosis of anxiety, depression, bipolar disorder.    Previous Psych Meds: Patient reports that she has trialed several medications in the past but is unsure of them all.  She does endorse that she had negative side effects with Effexor, Zoloft, Depakote, and Wellbutrin.  She does identify that she has previously trialed Invega but experienced an increase in prolactin levels related to this. Gene site testing was conducted at the [facility].    Substance Abuse: Patient denies history or current illicit substance use, EtOH.  She does endorse daily nicotine use in the form of cigarettes x13 years approximating 0.5 to 1 pack/day with last reported use being today.  Patient does report caffeine intake approximating 16 to 32 ounces daily.    Social History: Patient identifies that she was born and raised in [place] until she was in second grade.  Patient reports that primary custody until she was in  was to her biological father.  Identifies a strained relationship with biological mother.  Patient identifies that she was raised by paternal grandparents and adopted.  She identifies that after leaving [place] that she moved to [place], [place] then to [place], [place] then to [place].  She identifies that she then moved to [facility] x1 year.   3 times total; however, 2 marriages to the same person.  She identifies first marriage x10 months.  Second marriage 1 year.  Third marriage x3-1/2 years.  Endorses that first and third marriage is to current .  Reports previous relationships between marriage courses.  2 children.  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  High school graduate, some college.  Denies any previous or pending legal matters.      Family Psychiatric History:  family history includes ADD / ADHD in her child; Anxiety disorder in her mother; Asthma in an other family member; Bipolar  disorder in her mother; Diabetes in an other family member; Heart disease in an other family member; Hypertension in an other family member; Kidney disease in an other family member; Osteoarthritis in an other family member; Osteoporosis in an other family member; Schizophrenia in her brother; Stroke in an other family member.    Medical/Surgical History:  Past Medical History:   Diagnosis Date   • Ankle sprain     right   • Anxiety    • Bipolar disorder (HCC)    • Carpal tunnel syndrome    • Chronic pain disorder     Fibroymyalgia   • Depression    • Suicide attempt ([facility])    • Tear of meniscus of knee     left      Past Surgical History:   Procedure Laterality Date   • CARPAL TUNNEL RELEASE     •  SECTION     • CHOLECYSTECTOMY     • LAPAROSCOPIC TUBAL LIGATION     • MOUTH SURGERY     • TONSILLECTOMY AND ADENOIDECTOMY     • TUBAL ABDOMINAL LIGATION         Allergies   Allergen Reactions   • Codeine    • Penicillins    • Sulfa Antibiotics            Current Medications:   Current Outpatient Medications   Medication Sig Dispense Refill   • cloNIDine (Catapres) 0.1 MG tablet Take 1 tablet by mouth Every Night for 90 days. 90 tablet 0   • methylphenidate (Concerta) 18 MG CR tablet Take 1 tablet by mouth Daily for 30 days 30 tablet 0   • ProAir  (90 Base) MCG/ACT inhaler INHALE 2 PUFFS BY MOUTH THREE TIMES DAILY AS NEEDED FOR 10 DAYS     • vilazodone (Viibryd) 20 MG tablet tablet Take 1 tablet by mouth Daily for 90 days. 90 tablet 0     No current facility-administered medications for this visit.         Review of Systems   Constitutional: Positive for activity change and appetite change. Negative for fatigue.   HENT: Negative for dental problem, drooling, ear pain and sore throat.    Eyes: Negative for redness and visual disturbance.   Respiratory: Negative for chest tightness.    Cardiovascular: Negative for chest pain and palpitations.   Gastrointestinal: Negative for abdominal pain, diarrhea and  nausea.   Endocrine: Negative for polydipsia and polyuria.   Genitourinary: Negative for frequency and urgency.   Musculoskeletal: Negative for back pain and gait problem.   Skin: Negative for pallor and rash.   Allergic/Immunologic: Negative for environmental allergies and immunocompromised state.   Neurological: Negative for dizziness, tremors, seizures, syncope, facial asymmetry, speech difficulty, weakness, light-headedness, numbness and headaches.   Hematological: Negative for adenopathy. Does not bruise/bleed easily.   Psychiatric/Behavioral: Positive for decreased concentration. Negative for agitation, behavioral problems, confusion, dysphoric mood, hallucinations, self-injury, sleep disturbance and suicidal ideas. The patient is not nervous/anxious and is not hyperactive.     denies HEENT, cardiovascular, respiratory, liver, renal, GI/, endocrine, neuro, DERM, hematology, immunology, musculoskeletal disorders.    Objective   Physical Exam  Vitals reviewed.   Constitutional:       Appearance: Normal appearance. She is obese.   HENT:      Head: Normocephalic.      Nose: Nose normal.      Mouth/Throat:      Mouth: Mucous membranes are moist.      Pharynx: Oropharynx is clear.   Eyes:      Extraocular Movements: Extraocular movements intact.      Pupils: Pupils are equal, round, and reactive to light.   Cardiovascular:      Rate and Rhythm: Normal rate.   Pulmonary:      Effort: Pulmonary effort is normal.   Musculoskeletal:         General: Normal range of motion.      Cervical back: Normal range of motion.   Skin:     General: Skin is warm and dry.   Neurological:      General: No focal deficit present.      Mental Status: She is alert and oriented to person, place, and time. Mental status is at baseline.   Psychiatric:         Attention and Perception: Attention and perception normal.         Mood and Affect: Mood and affect normal. Mood is not anxious. Affect is not tearful.         Speech: Speech normal.   "       Behavior: Behavior normal. Behavior is cooperative.         Thought Content: Thought content normal.         Cognition and Memory: Cognition and memory normal.         Judgment: Judgment normal.       Blood pressure 117/83, pulse 106, temperature 97.7 °F (36.5 °C), height 149.9 cm (59.02\"), weight 92.3 kg (203 lb 6.4 oz), SpO2 97 %.    Mental Status Exam:   Hygiene:   good  Cooperation:  Cooperative  Eye Contact:  Good  Psychomotor Behavior:  Appropriate  Affect:  Full range and Appropriate  Hopelessness: Denies  Speech:  Normal  Thought Process:  Goal directed and Linear  Thought Content:  Normal and Mood congruent  Suicidal:  None  Homicidal:  None  Hallucinations:  None  Delusion:  None  Memory:  Intact  Orientation:  Person, Place, Time and Situation  Reliability:  fair  Insight:  Fair  Judgement:  Fair  Impulse Control:  Fair  Physical/Medical Issues:  No       Short-term goals: Patient will be compliant with clinic appointments.  Patient will be engaged in therapy, medication compliant with minimal side effects. Patient  will report decrease of symptoms and frequency.    Long-term goals: Patient will have minimal symptoms of  with continued medication management. Patient will be compliant with treatment and appointments.     Strengths: Motivation for treatment, insight  Weaknesses: Support, strained family dynamics, recent loss    Functional Status: moderate impairment in areas of daily functioning.  Prognosis: Guarded dependent on medication/follow up and treatment plan compliance.    Enriqueta Gotti  reports that she has been smoking cigarettes. She started smoking about 6 years ago. She has a 3.00 pack-year smoking history. She has never used smokeless tobacco.. I have educated her on the risk of diseases from using tobacco products such as cancer, COPD, heart disease and reproductive problems.     I advised her to quit and she is not willing to quit.    I spent 3  minutes counseling the " patient.    Assessment & Plan   Diagnoses and all orders for this visit:    1. Generalized anxiety disorder (Primary)    2. ADHD (attention deficit hyperactivity disorder), inattentive type  -     methylphenidate (Concerta) 18 MG CR tablet; Take 1 tablet by mouth Daily for 30 days  Dispense: 30 tablet; Refill: 0  -     cloNIDine (Catapres) 0.1 MG tablet; Take 1 tablet by mouth Every Night for 90 days.  Dispense: 90 tablet; Refill: 0      -Elieser reviewed and appropriate   -Continue Viibryd 20 mg p.o. daily for anxiety and depression  -Discontinue Ritalin  -Start Concerta 18 mg p.o. daily for ADHD  -Continue clonidine 0.1 mg p.o. nightly for ADHD  -Recommend psychotherapy with LCSW  -Medications sent to pharmacy at this time    Discussed medication and psychotherapy options.  Patient has noticed positive reduction in symptom burden with the initiation of Ritalin and clonidine.  Patient is having difficulty remembering second dosing during the day.  Patient is going to transition from IR Ritalin to Concerta to aid in compliance.  Patient is to continue clonidine and Viibryd without change.  Reintegrated side effects associated with the above medications, patient does verbalize understanding. Patient is aware to contact the [facility] with any worsening of symptom.  Patient is agreeable to go to the ER or call 911 should they begin SI/HI.    Return in about 4 weeks (around 11/2/2022), or if symptoms worsen or fail to improve, for Next scheduled follow up.      This document has been electronically signed by DALILA Guthrie   October 5, 2022 11:56 EDT   Errors in dictation may reflect use of voice recognition software and not all errors in transcription may have been detected prior to signing.

## 2022-11-06 ENCOUNTER — APPOINTMENT (OUTPATIENT)
Dept: GENERAL RADIOLOGY | Facility: HOSPITAL | Age: 32
End: 2022-11-06

## 2022-11-06 ENCOUNTER — HOSPITAL ENCOUNTER (EMERGENCY)
Facility: HOSPITAL | Age: 32
Discharge: HOME | End: 2022-11-06
Attending: EMERGENCY MEDICINE | Admitting: STUDENT IN AN ORGANIZED HEALTH CARE EDUCATION/TRAINING PROGRAM

## 2022-11-06 VITALS
WEIGHT: 206 LBS | TEMPERATURE: 97.5 F | BODY MASS INDEX: 41.53 KG/M2 | SYSTOLIC BLOOD PRESSURE: 131 MMHG | OXYGEN SATURATION: 99 % | HEIGHT: 59 IN | RESPIRATION RATE: 20 BRPM | DIASTOLIC BLOOD PRESSURE: 71 MMHG | HEART RATE: 95 BPM

## 2022-11-06 DIAGNOSIS — S83.8X2A SPRAIN OF OTHER LIGAMENT OF LEFT KNEE, INITIAL ENCOUNTER: Primary | ICD-10-CM

## 2022-11-06 DIAGNOSIS — S93.492A SPRAIN OF OTHER LIGAMENT OF LEFT ANKLE, INITIAL ENCOUNTER: ICD-10-CM

## 2022-11-06 PROCEDURE — 99282 EMERGENCY DEPT VISIT SF MDM: CPT

## 2022-11-06 PROCEDURE — 73610 X-RAY EXAM OF ANKLE: CPT | Performed by: RADIOLOGY

## 2022-11-06 PROCEDURE — 73610 X-RAY EXAM OF ANKLE: CPT

## 2022-11-06 PROCEDURE — 73562 X-RAY EXAM OF KNEE 3: CPT | Performed by: RADIOLOGY

## 2022-11-06 PROCEDURE — 73562 X-RAY EXAM OF KNEE 3: CPT

## 2022-11-06 RX ORDER — NAPROXEN 500 MG/1
500 TABLET ORAL 2 TIMES DAILY PRN
Qty: 12 TABLET | Refills: 0 | OUTPATIENT
Start: 2022-11-06 | End: 2022-11-28

## 2022-11-11 ENCOUNTER — OFFICE VISIT (OUTPATIENT)
Dept: FAMILY MEDICINE CLINIC | Facility: CLINIC | Age: 32
End: 2022-11-11

## 2022-11-11 VITALS
HEIGHT: 59 IN | TEMPERATURE: 97.7 F | RESPIRATION RATE: 16 BRPM | BODY MASS INDEX: 41.61 KG/M2 | OXYGEN SATURATION: 99 % | HEART RATE: 77 BPM

## 2022-11-11 DIAGNOSIS — J45.20 MILD INTERMITTENT ASTHMA WITHOUT COMPLICATION: ICD-10-CM

## 2022-11-11 DIAGNOSIS — J06.9 VIRAL URI WITH COUGH: Primary | ICD-10-CM

## 2022-11-11 LAB
EXPIRATION DATE: NORMAL
FLUAV AG NPH QL: NEGATIVE
FLUBV AG NPH QL: NEGATIVE
INTERNAL CONTROL: NORMAL
Lab: NORMAL
S PYO AG THROAT QL: NEGATIVE
SARS-COV-2 AG UPPER RESP QL IA.RAPID: NOT DETECTED

## 2022-11-11 PROCEDURE — 87426 SARSCOV CORONAVIRUS AG IA: CPT | Performed by: NURSE PRACTITIONER

## 2022-11-11 PROCEDURE — 99213 OFFICE O/P EST LOW 20 MIN: CPT | Performed by: NURSE PRACTITIONER

## 2022-11-11 PROCEDURE — 87804 INFLUENZA ASSAY W/OPTIC: CPT | Performed by: NURSE PRACTITIONER

## 2022-11-11 PROCEDURE — 87880 STREP A ASSAY W/OPTIC: CPT | Performed by: NURSE PRACTITIONER

## 2022-11-11 RX ORDER — BROMPHENIRAMINE MALEATE, PSEUDOEPHEDRINE HYDROCHLORIDE, AND DEXTROMETHORPHAN HYDROBROMIDE 2; 30; 10 MG/5ML; MG/5ML; MG/5ML
5 SYRUP ORAL 4 TIMES DAILY PRN
Qty: 200 ML | Refills: 0 | Status: SHIPPED | OUTPATIENT
Start: 2022-11-11 | End: 2022-12-14

## 2022-11-11 NOTE — ED PROVIDER NOTES
Subjective   History of Present Illness  Pt c/o left knee and left ankle   Fell last night and twisted left knee     History provided by:  Patient   used: No    Ankle Injury  Severity:  Mild  Onset quality:  Sudden  Duration:  1 day  Timing:  Constant  Chronicity:  New  Relieved by:  Nothing   Worsened by:  Ambulation   Associated symptoms: no chest pain, no congestion, no cough, no diarrhea, no ear pain, no fever, no headaches, no nausea, no rash, no shortness of breath, no sore throat, no vomiting and no wheezing        Review of Systems   Constitutional: Negative for chills and fever.   HENT: Negative for congestion, ear pain and sore throat.    Respiratory: Negative for cough, shortness of breath and wheezing.    Cardiovascular: Negative for chest pain.   Gastrointestinal: Negative for diarrhea, nausea and vomiting.   Genitourinary: Negative for dysuria and flank pain.   Skin: Negative for rash.   Neurological: Negative for headaches.   Psychiatric/Behavioral: The patient is not nervous/anxious.    All other systems reviewed and are negative.      Past Medical History:   Diagnosis Date   • ADHD (attention deficit hyperactivity disorder)    • Allergic    • Ankle sprain     right   • Anxiety    • Bipolar disorder ([facility])    • Carpal tunnel syndrome    • Chronic pain disorder     Fibroymyalgia   • Depression    • Suicide attempt ([facility])    • Tear of meniscus of knee     left        Allergies   Allergen Reactions   • Codeine    • Penicillins    • Sulfa Antibiotics        Past Surgical History:   Procedure Laterality Date   • CARPAL TUNNEL RELEASE     •  SECTION     • CHOLECYSTECTOMY     • LAPAROSCOPIC TUBAL LIGATION     • MOUTH SURGERY     • TONSILLECTOMY AND ADENOIDECTOMY     • TUBAL ABDOMINAL LIGATION         Family History   Problem Relation Age of Onset   • Anxiety disorder Mother    • Bipolar disorder Mother    • Schizophrenia Brother    • Osteoarthritis Other    • Osteoporosis Other     • Heart disease Other    • Hypertension Other    • Asthma Other    • Diabetes Other    • Kidney disease Other    • Stroke Other    • ADD / ADHD Child        Social History     Socioeconomic History   • Marital status:    Tobacco Use   • Smoking status: Every Day     Packs/day: 1.00     Years: 3.00     Pack years: 3.00     Types: Cigarettes     Start date: 1/17/2016   • Smokeless tobacco: Never   • Tobacco comments:     Trying to quit.   Vaping Use   • Vaping Use: Never used   Substance and Sexual Activity   • Alcohol use: Yes     Comment: rarely   • Drug use: No   • Sexual activity: Defer           Objective   Physical Exam  Vitals and nursing note reviewed.   Constitutional:       Appearance: She is well-developed.   HENT:      Head: Normocephalic.   Cardiovascular:      Rate and Rhythm: Normal rate and regular rhythm.   Pulmonary:      Effort: Pulmonary effort is normal.      Breath sounds: Normal breath sounds.   Abdominal:      General: Bowel sounds are normal.      Palpations: Abdomen is soft.      Tenderness: There is no abdominal tenderness.   Musculoskeletal:      Cervical back: Neck supple.      Left knee: Decreased range of motion. Tenderness present.      Left ankle: Tenderness present. Decreased range of motion.   Skin:     General: Skin is warm and dry.   Neurological:      Mental Status: She is alert and oriented to person, place, and time.   Psychiatric:         Behavior: Behavior normal.         Thought Content: Thought content normal.         Judgment: Judgment normal.         Procedures           ED Course                                           MDM    Final diagnoses:   Sprain of other ligament of left knee, initial encounter   Sprain of other ligament of left ankle, initial encounter       ED Disposition  ED Disposition     ED Disposition   Discharge    Condition   Stable    Comment   --             Laurie Mathis, APRN  [address]  [place] [address]  [phone]    In 2 days            Medication List      New Prescriptions    naproxen 500 MG EC tablet  Commonly known as: EC NAPROSYN  Take 1 tablet by mouth 2 (Two) Times a Day As Needed for Mild Pain.           Where to Get Your Medications      These medications were sent to [facility] [address] [place] [address] - [address] [place] [address] - [phone]  - [phone]   [address] [facility] [address]    Phone: [phone]   · naproxen 500 MG EC tablet          Debbie Amezcua PA  11/25/22 0507

## 2022-11-11 NOTE — PROGRESS NOTES
Chief Complaint  Cough, Sore Throat (LOST VOICE), Nasal Congestion, and Headache    Subjective          Enriqueta Gotti is a 31 y.o. female who presents today to [facility] for initial evaluation     HPI:   History of Present Illness    Presents to clinic for complaint of cough, sinus drainage, sore throat, loss of voice.  Associated symptoms: Headache Symptoms started: 3 days ago.Treatments: Tylenol Cold and sinus with minimal relief.  Does report history of asthma and needs refill on inhaler.  No other issues or concerns at this time.    The following portions of the patient's history were reviewed and updated as appropriate: allergies, current medications, past family history, past medical history, past social history, past surgical history and problem list.    Objective     Allergy:   Allergies   Allergen Reactions   • Codeine    • Penicillins    • Sulfa Antibiotics         Current Medications:   Current Outpatient Medications   Medication Sig Dispense Refill   • cloNIDine (Catapres) 0.1 MG tablet Take 1 tablet by mouth Every Night for 90 days. 90 tablet 0   • methylphenidate (Concerta) 18 MG CR tablet Take 1 tablet by mouth Daily for 30 days 30 tablet 0   • naproxen (EC NAPROSYN) 500 MG EC tablet Take 1 tablet by mouth 2 (Two) Times a Day As Needed for Mild Pain. 12 tablet 0   • ProAir  (90 Base) MCG/ACT inhaler Inhale 2 puffs Every 6 (Six) Hours As Needed for Wheezing or Shortness of Air. 18 g 2   • vilazodone (Viibryd) 20 MG tablet tablet Take 1 tablet by mouth Daily for 90 days. 90 tablet 0   • brompheniramine-pseudoephedrine-DM 30-2-10 MG/5ML syrup Take 5 mL by mouth 4 (Four) Times a Day As Needed for Congestion, Cough or Allergies. 200 mL 0     No current facility-administered medications for this visit.       Past Medical History:  Past Medical History:   Diagnosis Date   • ADHD (attention deficit hyperactivity disorder)    • Allergic    • Ankle sprain      "right   • Anxiety    • Bipolar disorder (HCC)    • Carpal tunnel syndrome    • Chronic pain disorder     Fibroymyalgia   • Depression    • Suicide attempt (HCC)    • Tear of meniscus of knee     left        Past Surgical History:  Past Surgical History:   Procedure Laterality Date   • CARPAL TUNNEL RELEASE     •  SECTION     • CHOLECYSTECTOMY     • LAPAROSCOPIC TUBAL LIGATION     • MOUTH SURGERY     • TONSILLECTOMY AND ADENOIDECTOMY     • TUBAL ABDOMINAL LIGATION         Social History:  Social History     Socioeconomic History   • Marital status:    Tobacco Use   • Smoking status: Every Day     Packs/day: 1.00     Years: 3.00     Pack years: 3.00     Types: Cigarettes     Start date: 2016   • Smokeless tobacco: Never   • Tobacco comments:     Trying to quit.   Vaping Use   • Vaping Use: Never used   Substance and Sexual Activity   • Alcohol use: Yes     Comment: rarely   • Drug use: No   • Sexual activity: Defer       Family History:  Family History   Problem Relation Age of Onset   • Anxiety disorder Mother    • Bipolar disorder Mother    • Schizophrenia Brother    • Osteoarthritis Other    • Osteoporosis Other    • Heart disease Other    • Hypertension Other    • Asthma Other    • Diabetes Other    • Kidney disease Other    • Stroke Other    • ADD / ADHD Child        Review of Systems:  Review of Systems   Respiratory: Negative for shortness of breath and wheezing.        Vital Signs:   Pulse 77   Temp 97.7 °F (36.5 °C) (Temporal)   Resp 16   Ht 149.9 cm (59\")   SpO2 99%   BMI 41.61 kg/m²      Physical Exam:  Physical Exam  Vitals and nursing note reviewed.   Constitutional:       General: She is not in acute distress.     Appearance: Normal appearance. She is not ill-appearing or toxic-appearing.   HENT:      Head: Normocephalic.      Nose: Congestion and rhinorrhea present.      Mouth/Throat:      Mouth: Mucous membranes are moist.      Pharynx: Oropharynx is clear.      Comments: " PND  Eyes:      Conjunctiva/sclera: Conjunctivae normal.   Cardiovascular:      Rate and Rhythm: Normal rate and regular rhythm.      Heart sounds: Normal heart sounds.   Pulmonary:      Effort: Pulmonary effort is normal. No respiratory distress.      Breath sounds: Normal breath sounds.   Lymphadenopathy:      Cervical: No cervical adenopathy.   Skin:     General: Skin is warm and dry.      Coloration: Skin is not pale.   Neurological:      Mental Status: She is alert and oriented to person, place, and time.   Psychiatric:         Mood and Affect: Mood normal.         Behavior: Behavior normal.         Thought Content: Thought content normal.         Judgment: Judgment normal.                  Assessment and Plan   Diagnoses and all orders for this visit:    1. Viral URI with cough (Primary)  -     POCT SARS-CoV-2 Antigen ALINA  -     POCT Influenza A/B  -     POCT rapid strep A  -     brompheniramine-pseudoephedrine-DM 30-2-10 MG/5ML syrup; Take 5 mL by mouth 4 (Four) Times a Day As Needed for Congestion, Cough or Allergies.  Dispense: 200 mL; Refill: 0    2. Mild intermittent asthma without complication  -     ProAir  (90 Base) MCG/ACT inhaler; Inhale 2 puffs Every 6 (Six) Hours As Needed for Wheezing or Shortness of Air.  Dispense: 18 g; Refill: 2    1.  Cough and deep breathe.  Increase humidification hydration.  Stop OTC cold medicine and can do Bromfed-DM, PRN, as directed.  2.  Inhaler as directed.    Discussed possible differential diagnoses, testing, treatment, recommended non-pharmacological interventions, risks, warning signs to monitor for that would indicate need for follow-up in clinic or ER. If no improvement with these regimens or you have new or worsening symptoms follow-up. Patient verbalizes understanding and agreement with plan of care. Denies further needs or concerns.     Patient was given instructions and counseling regarding her condition and for health maintenance advised.    I spent  20 minutes caring for patient on this date of service. This time includes time spent by me in the following activities: preparing for the visit, reviewing tests, obtaining and/or reviewing a separately obtained history, performing a medically appropriate examination and/or evaluation, counseling and educating the patient/family/caregiver, ordering medications, tests, or procedures and documenting information in the medical record    Meds ordered during this visit:  New Medications Ordered This Visit   Medications   • ProAir  (90 Base) MCG/ACT inhaler     Sig: Inhale 2 puffs Every 6 (Six) Hours As Needed for Wheezing or Shortness of Air.     Dispense:  18 g     Refill:  2   • brompheniramine-pseudoephedrine-DM 30-2-10 MG/5ML syrup     Sig: Take 5 mL by mouth 4 (Four) Times a Day As Needed for Congestion, Cough or Allergies.     Dispense:  200 mL     Refill:  0       Patient Instructions:  Patient instructions given for the following visit diagnosis:    ICD-10-CM ICD-9-CM   1. Viral URI with cough  J06.9 465.9   2. Mild intermittent asthma without complication  J45.20 493.90       Follow Up   Return if symptoms worsen or fail to improve.        This document has been electronically signed by DALILA Juan  November 11, 2022 10:34 EST    Patient was given instructions and counseling regarding her condition or for health maintenance advice. Please see specific information pulled into the AVS if appropriate.     Part of this note may be an electronic transcription/translation of spoken language to printed text using the Dragon Dictation System.

## 2022-11-15 ENCOUNTER — TELEPHONE (OUTPATIENT)
Dept: FAMILY MEDICINE CLINIC | Facility: CLINIC | Age: 32
End: 2022-11-15

## 2022-11-15 DIAGNOSIS — F90.0 ADHD (ATTENTION DEFICIT HYPERACTIVITY DISORDER), INATTENTIVE TYPE: ICD-10-CM

## 2022-11-15 NOTE — TELEPHONE ENCOUNTER
Patient is needing refills on the following medications    methylphenidate (Concerta) 18 MG CR tablet     cloNIDine (Catapres) 0.1 MG tablet

## 2022-11-21 RX ORDER — METHYLPHENIDATE HYDROCHLORIDE 18 MG/1
18 TABLET ORAL DAILY
Qty: 30 TABLET | Refills: 0 | Status: SHIPPED | OUTPATIENT
Start: 2022-11-21 | End: 2023-01-17

## 2022-11-21 RX ORDER — CLONIDINE HYDROCHLORIDE 0.1 MG/1
0.1 TABLET ORAL NIGHTLY
Qty: 90 TABLET | Refills: 0 | Status: SHIPPED | OUTPATIENT
Start: 2022-11-21 | End: 2022-12-14

## 2022-11-28 ENCOUNTER — HOSPITAL ENCOUNTER (EMERGENCY)
Facility: HOSPITAL | Age: 32
Discharge: HOME | End: 2022-11-28
Attending: STUDENT IN AN ORGANIZED HEALTH CARE EDUCATION/TRAINING PROGRAM | Admitting: STUDENT IN AN ORGANIZED HEALTH CARE EDUCATION/TRAINING PROGRAM

## 2022-11-28 VITALS
OXYGEN SATURATION: 100 % | SYSTOLIC BLOOD PRESSURE: 115 MMHG | RESPIRATION RATE: 18 BRPM | BODY MASS INDEX: 41.53 KG/M2 | TEMPERATURE: 100.5 F | WEIGHT: 206 LBS | HEIGHT: 59 IN | HEART RATE: 113 BPM | DIASTOLIC BLOOD PRESSURE: 72 MMHG

## 2022-11-28 DIAGNOSIS — J10.1 INFLUENZA A: Primary | ICD-10-CM

## 2022-11-28 LAB
FLUAV RNA RESP QL NAA+PROBE: DETECTED
FLUBV RNA RESP QL NAA+PROBE: NOT DETECTED
S PYO AG THROAT QL: NEGATIVE
SARS-COV-2 RNA RESP QL NAA+PROBE: NOT DETECTED

## 2022-11-28 PROCEDURE — 99283 EMERGENCY DEPT VISIT LOW MDM: CPT

## 2022-11-28 PROCEDURE — 87880 STREP A ASSAY W/OPTIC: CPT | Performed by: PHYSICIAN ASSISTANT

## 2022-11-28 PROCEDURE — 87081 CULTURE SCREEN ONLY: CPT | Performed by: PHYSICIAN ASSISTANT

## 2022-11-28 PROCEDURE — 87636 SARSCOV2 & INF A&B AMP PRB: CPT | Performed by: PHYSICIAN ASSISTANT

## 2022-11-28 PROCEDURE — C9803 HOPD COVID-19 SPEC COLLECT: HCPCS

## 2022-11-28 RX ORDER — IBUPROFEN 600 MG/1
600 TABLET ORAL EVERY 6 HOURS PRN
Qty: 15 TABLET | Refills: 0 | Status: SHIPPED | OUTPATIENT
Start: 2022-11-28 | End: 2022-12-14

## 2022-11-28 RX ORDER — OSELTAMIVIR PHOSPHATE 75 MG/1
75 CAPSULE ORAL 2 TIMES DAILY
Qty: 10 CAPSULE | Refills: 0 | Status: SHIPPED | OUTPATIENT
Start: 2022-11-28 | End: 2022-12-14

## 2022-11-28 RX ORDER — BENZONATATE 100 MG/1
100 CAPSULE ORAL 3 TIMES DAILY PRN
Qty: 21 CAPSULE | Refills: 0 | Status: SHIPPED | OUTPATIENT
Start: 2022-11-28 | End: 2022-12-14

## 2022-11-28 RX ADMIN — IBUPROFEN 600 MG: 400 TABLET, FILM COATED ORAL at 13:04

## 2022-11-30 LAB — BACTERIA SPEC AEROBE CULT: NORMAL

## 2022-12-14 ENCOUNTER — OFFICE VISIT (OUTPATIENT)
Dept: PSYCHIATRY | Facility: CLINIC | Age: 32
End: 2022-12-14

## 2022-12-14 VITALS
BODY MASS INDEX: 40.48 KG/M2 | HEART RATE: 95 BPM | TEMPERATURE: 97.7 F | OXYGEN SATURATION: 96 % | SYSTOLIC BLOOD PRESSURE: 120 MMHG | DIASTOLIC BLOOD PRESSURE: 83 MMHG | WEIGHT: 200.8 LBS | HEIGHT: 59 IN

## 2022-12-14 DIAGNOSIS — F41.1 GENERALIZED ANXIETY DISORDER: ICD-10-CM

## 2022-12-14 DIAGNOSIS — F33.1 MAJOR DEPRESSIVE DISORDER, RECURRENT, MODERATE: ICD-10-CM

## 2022-12-14 PROCEDURE — 99215 OFFICE O/P EST HI 40 MIN: CPT

## 2022-12-14 RX ORDER — VILAZODONE HYDROCHLORIDE 20 MG/1
20 TABLET ORAL DAILY
Qty: 90 TABLET | Refills: 0 | Status: SHIPPED | OUTPATIENT
Start: 2022-12-14 | End: 2023-03-16

## 2022-12-14 RX ORDER — HYDROXYZINE HYDROCHLORIDE 25 MG/1
TABLET, FILM COATED ORAL
Qty: 60 TABLET | Refills: 1 | Status: SHIPPED | OUTPATIENT
Start: 2022-12-14 | End: 2023-02-20 | Stop reason: SDUPTHER

## 2022-12-14 RX ORDER — LAMOTRIGINE 25 MG/1
TABLET ORAL
Qty: 30 TABLET | Refills: 1 | Status: SHIPPED | OUTPATIENT
Start: 2022-12-14 | End: 2023-01-17 | Stop reason: SDUPTHER

## 2022-12-14 NOTE — PROGRESS NOTES
"Subjective   Enriqueta Gotti is a 32 y.o. female who is here today for medication management follow-up appointment.    Chief Complaint: Anxiety.  ADHD.  Depression    HPI:  History of Present Illness     Patient denies negative side effects of current regimen. Patient reports increased anxiety and depression symptoms associated with increased life stressors. She reports that \"this is a bad time for year for me.\"  She identifies that she has had an influx of financial stressors associated with her recent move and job changes.  She feels that her support system is well as her friends are very helpful.  Reports increased behavior concerns with her children which is stressful to her.  She identifies that since moving she has not been able to get her \"organizing stuff out, like my calendars and charts.\" Continues to feel that mood is better following her separation.  Sleep is reduced related to increased anxiety she is approximating 3 to 4 hours per night, denies any nightmares.  Appetite is unchanged 2-3 meals per day.  6 pounds lost since last encounter.  Patient reports that she has previously recovered from the flu and was experiencing a reduction in appetite. Body mass index is 40.53 kg/m².  Denies any symptoms that would be suggestive of ab and/or hypomania.  Continues to note that ADHD symptoms are managed and improved on current medication regimen.  Reports related to increased stress PTSD symptoms have increased and has noticed an increase in flashbacks as well as intrusive memories. Denies self-harm.  Denies AVH.  Denies SI and HI.    Past Psych History: Inpatient admission x3; described above.  Patient reports previous outpatient medication management through [facility], [facility], most [facility].  Patient denies a history of self-harm.  Patient denies a history of TBI or seizures.  Patient reports previous diagnosis of anxiety, depression, bipolar disorder.    Previous Psych Meds: " Patient reports that she has trialed several medications in the past but is unsure of them all.  She does endorse that she had negative side effects with Effexor, Zoloft, Depakote, and Wellbutrin.  She does identify that she has previously trialed Invega but experienced an increase in prolactin levels related to this. Gene site testing was conducted at the [facility].    Substance Abuse: Patient denies history or current illicit substance use, EtOH.  She does endorse daily nicotine use in the form of cigarettes x13 years approximating 0.5 to 1 pack/day with last reported use being today.  Patient does report caffeine intake approximating 16 to 32 ounces daily.    Social History: Patient identifies that she was born and raised in [place] until she was in second grade.  Patient reports that primary custody until she was in  was to her biological father.  Identifies a strained relationship with biological mother.  Patient identifies that she was raised by paternal grandparents and adopted.  She identifies that after leaving [place] that she moved to [place], [place] then to [place], [place] then to [place].  She identifies that she then moved to [facility] x1 year.   3 times total; however, 2 marriages to the same person.  She identifies first marriage x10 months.  Second marriage 1 year.  Third marriage x3-1/2 years.  Endorses that first and third marriage is to current .  Reports previous relationships between marriage courses.  2 children.  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  High school graduate, some college.  Denies any previous or pending legal matters.      Family Psychiatric History:  family history includes ADD / ADHD in her child; Anxiety disorder in her mother; Asthma in an other family member; Bipolar disorder in her mother; Diabetes in an other family member; Heart disease in an other family member; Hypertension in an other family  member; Kidney disease in an other family member; Osteoarthritis in an other family member; Osteoporosis in an other family member; Schizophrenia in her brother; Stroke in an other family member.    Medical/Surgical History:  Past Medical History:   Diagnosis Date   • ADHD (attention deficit hyperactivity disorder)    • Allergic    • Ankle sprain     right   • Anxiety    • Bipolar disorder (HCC)    • Carpal tunnel syndrome    • Chronic pain disorder     Fibroymyalgia   • Depression    • Suicide attempt (HCC)    • Tear of meniscus of knee     left      Past Surgical History:   Procedure Laterality Date   • CARPAL TUNNEL RELEASE     •  SECTION     • CHOLECYSTECTOMY     • LAPAROSCOPIC TUBAL LIGATION     • MOUTH SURGERY     • TONSILLECTOMY AND ADENOIDECTOMY     • TUBAL ABDOMINAL LIGATION         Allergies   Allergen Reactions   • Codeine    • Penicillins    • Sulfa Antibiotics            Current Medications:   Current Outpatient Medications   Medication Sig Dispense Refill   • vilazodone (Viibryd) 20 MG tablet tablet Take 1 tablet by mouth Daily for 90 days. 90 tablet 0   • hydrOXYzine (ATARAX) 25 MG tablet Take 1-2 tablets nightly as needed for sleep . 60 tablet 1   • lamoTRIgine (LaMICtal) 25 MG tablet Take 1 tablet by mouth Daily for 14 days, THEN 2 tablets Daily for 16 days. 30 tablet 1   • methylphenidate (Concerta) 18 MG CR tablet Take 1 tablet by mouth Daily for 30 days 30 tablet 0   • ProAir  (90 Base) MCG/ACT inhaler Inhale 2 puffs Every 6 (Six) Hours As Needed for Wheezing or Shortness of Air. 18 g 2     No current facility-administered medications for this visit.         Review of Systems   Constitutional: Positive for activity change. Negative for fatigue.   HENT: Negative for dental problem, drooling, ear pain and sore throat.    Eyes: Negative for redness and visual disturbance.   Respiratory: Negative for chest tightness.    Cardiovascular: Negative for chest pain and palpitations.    Gastrointestinal: Negative for abdominal pain, diarrhea and nausea.   Endocrine: Negative for polydipsia and polyuria.   Genitourinary: Negative for frequency and urgency.   Musculoskeletal: Negative for back pain and gait problem.   Skin: Negative for pallor and rash.   Allergic/Immunologic: Negative for environmental allergies and immunocompromised state.   Neurological: Negative for dizziness, tremors, seizures, syncope, facial asymmetry, speech difficulty, weakness, light-headedness, numbness and headaches.   Hematological: Negative for adenopathy. Does not bruise/bleed easily.   Psychiatric/Behavioral: Positive for decreased concentration. Negative for agitation, behavioral problems, confusion, dysphoric mood, hallucinations, self-injury, sleep disturbance and suicidal ideas. The patient is not nervous/anxious and is not hyperactive.     denies HEENT, cardiovascular, respiratory, liver, renal, GI/, endocrine, neuro, DERM, hematology, immunology, musculoskeletal disorders.    Objective   Physical Exam  Vitals reviewed.   Constitutional:       Appearance: Normal appearance. She is obese.   HENT:      Head: Normocephalic.      Nose: Nose normal.      Mouth/Throat:      Mouth: Mucous membranes are moist.      Pharynx: Oropharynx is clear.   Eyes:      Extraocular Movements: Extraocular movements intact.      Pupils: Pupils are equal, round, and reactive to light.   Cardiovascular:      Rate and Rhythm: Normal rate.   Pulmonary:      Effort: Pulmonary effort is normal.   Musculoskeletal:         General: Normal range of motion.      Cervical back: Normal range of motion.   Skin:     General: Skin is warm and dry.   Neurological:      General: No focal deficit present.      Mental Status: She is alert and oriented to person, place, and time. Mental status is at baseline.   Psychiatric:         Attention and Perception: Attention and perception normal.         Mood and Affect: Mood is not depressed. Affect is  "tearful.         Speech: Speech normal.         Behavior: Behavior normal. Behavior is cooperative.         Thought Content: Thought content normal.         Cognition and Memory: Cognition and memory normal.         Judgment: Judgment normal.       Blood pressure 120/83, pulse 95, temperature 97.7 °F (36.5 °C), height 149.9 cm (59.02\"), weight 91.1 kg (200 lb 12.8 oz), SpO2 96 %.    Mental Status Exam:   Hygiene:   good  Cooperation:  Cooperative  Eye Contact:  Good  Psychomotor Behavior:  Appropriate  Affect:  Restricted  Hopelessness: Denies  Speech:  Normal  Thought Process:  Goal directed and Linear  Thought Content:  Normal and Mood congruent  Suicidal:  None  Homicidal:  None  Hallucinations:  None  Delusion:  None  Memory:  Intact  Orientation:  Person, Place, Time and Situation  Reliability:  fair  Insight:  Fair  Judgement:  Fair  Impulse Control:  Fair  Physical/Medical Issues:  No       Short-term goals: Patient will be compliant with clinic appointments.  Patient will be engaged in therapy, medication compliant with minimal side effects. Patient  will report decrease of symptoms and frequency.    Long-term goals: Patient will have minimal symptoms of  with continued medication management. Patient will be compliant with treatment and appointments.     Strengths: Motivation for treatment, insight  Weaknesses: Support, strained family dynamics, recent loss    Functional Status: moderate impairment in areas of daily functioning.  Prognosis: Guarded dependent on medication/follow up and treatment plan compliance.    Enriqueta Gotti  reports that she has been smoking cigarettes. She started smoking about 6 years ago. She has a 3.00 pack-year smoking history. She has never used smokeless tobacco.. I have educated her on the risk of diseases from using tobacco products such as cancer, COPD, heart disease and reproductive problems.     I advised her to quit and she is not willing to quit.    I spent 3  " minutes counseling the patient.    Assessment & Plan   Diagnoses and all orders for this visit:    1. Generalized anxiety disorder  -     vilazodone (Viibryd) 20 MG tablet tablet; Take 1 tablet by mouth Daily for 90 days.  Dispense: 90 tablet; Refill: 0  -     lamoTRIgine (LaMICtal) 25 MG tablet; Take 1 tablet by mouth Daily for 14 days, THEN 2 tablets Daily for 16 days.  Dispense: 30 tablet; Refill: 1  -     hydrOXYzine (ATARAX) 25 MG tablet; Take 1-2 tablets nightly as needed for sleep .  Dispense: 60 tablet; Refill: 1    2. Major depressive disorder, recurrent, moderate (HCC)  -     vilazodone (Viibryd) 20 MG tablet tablet; Take 1 tablet by mouth Daily for 90 days.  Dispense: 90 tablet; Refill: 0  -     lamoTRIgine (LaMICtal) 25 MG tablet; Take 1 tablet by mouth Daily for 14 days, THEN 2 tablets Daily for 16 days.  Dispense: 30 tablet; Refill: 1      -Elieser reviewed and appropriate    -Continue Viibryd 20 mg p.o. daily for anxiety and depression  -Start Lamictal 25 mg p.o. daily x14 days and increase to 50 mg p.o. daily thereafter as adjunct for depression and anxiety.  -Continue Concerta 18 mg p.o. daily for ADHD  -Discontinue clonidine 0.1 mg p.o. nightly for ADHD; denies efficacy  -Start hydroxyzine 25 to 50 mg p.o. nightly as needed for anxiety and sleep  -Recommend psychotherapy with LCSW  -Medications sent to pharmacy at this time    Discussed medication and psychotherapy options.  Patient is having an increase in mood symptoms, likely multifactorial in exacerbation.  Patient has trialed failed multiple medication regimens in the past in relation to anxiety and depression.  At this time she is to continue Viibryd and I am going to start Lamictal as described above off label for depression adjunct and anxiety.  Clonidine in the past was effective for anxiety and sleep, however, is ineffective at this time per patient report.  Starting hydroxyzine as described above as needed for anxiety and sleep.  She is  to continue Concerta without change as ADHD symptoms have been well controlled on this regimen.  I do believe that CBT on a regular basis would be very beneficial to aid in coping skills and work through past trauma, declines at this time. Reintegrated side effects associated with the above medications, patient does verbalize understanding. Patient is aware to contact the [facility] with any worsening of symptom.  Patient is agreeable to go to the ER or call 911 should they begin SI/HI.    This APRN has discussed the benefits and risks of starting Lamictal (Lamotrigine).  The side effects of Lamictal can include a benign rash, blurred or double vision, dizziness, ataxia, sedation, headache, tremor, insomnia, poor coordination, fatigue,  nausea, vomiting, dyspepsia, rhinitis, infection, pharyngitis, asthenia, a rare but serious rash, rare multi-organ failure associated with Posey-Lex Syndrome, toxic epidermal necrolysis, drug hypersensitivity syndrome, rare blood dyscrasias, rare aseptic meningitis, rare sudden unexplained deaths in people with epilepsy, withdrawal seizures upon abrupt withdrawal, and rare activation of suicidal ideation and behavior (suicidality).  This APRN has discussed with the patient that a very slow dose titration when starting Lamictal may reduce the incidence of skin rash and other side effects.  The dosage should not be titrated upwards or increased faster than recommended due to the possibility of the discussed side effects and risk of development of a skin rash (which can become life threatening).    This APRN has also discussed with the patient that if the patient stops taking the Lamictal for 3-5 days or longer, it will be necessary to restart the drug with the initial dose titration, as rashes have been reported on reexposure.    This APRN has also discussed with the patient that the patient should avoid new medications, foods, or products during the first 3 months of  Lamictal treatment in order to decrease the risk of unrelated rash.  The patient should also not start Lamictal within 2 weeks of a viral infection, a rash, or a vaccination.  Also, if the patient and Provider decide to stop the Lamictal, the patient will follow the directions of this APRN/this office as a guided taper over about two weeks is appropriate due to the risk of relapse in mood or bipolar disorder, the risk of seizures in those with epilepsy, and discontinuation symptoms upon rapid discontinuation of Lamictal.    The patient verbalizes understanding of benefits and risks as discussed, the patient feels the benefits outweigh the risks and is agreeable to start Lamictal via a slow titration schedule as discussed.  The patient is advised should any side effects or rash develops they are to stop the Lamictal immediately and contact this APRN/this office or go to the emergency department immediately.  The patient verbalizes understanding and agreement with treatment plan in their own words.    SUPPORTIVE PSYCHOTHERAPY: continuing efforts to promote the therapeutic alliance, address the patient’s issues, and strengthen self awareness, insights, and coping skills.Assisted patient in processing above session content; acknowledged and normalized patient’s thoughts, feelings, and concerns.  Applied  positive coping skills and behavior management in session.Allowed patient to freely discuss issues without interruption or judgment. Provided safe, confidential environment to facilitate the development of positive therapeutic relationship and encourage open, honest communication. Assisted patient in identifying risk factors which would indicate the need for higher level of care including thoughts to harm self or others and/or self-harming behavior and encouraged patient to contact this office, call 911, or present to the nearest emergency room should any of these events occur. Discussed crisis intervention services and  means to access.  Patient adamantly and convincingly denies current suicidal or homicidal ideation or perceptual disturbance.    Return in about 4 weeks (around 1/11/2023), or if symptoms worsen or fail to improve, for Next scheduled follow up.      This document has been electronically signed by DALILA Guthrie   January 3, 2023 08:42 EST   Errors in dictation may reflect use of voice recognition software and not all errors in transcription may have been detected prior to signing.    Spent a total of 55 minutes face-to-face with patient discussing diagnosis , discussing treatment options, and ordering medications.

## 2023-01-17 ENCOUNTER — OFFICE VISIT (OUTPATIENT)
Dept: PSYCHIATRY | Facility: CLINIC | Age: 33
End: 2023-01-17
Payer: MEDICAID

## 2023-01-17 VITALS
HEART RATE: 99 BPM | DIASTOLIC BLOOD PRESSURE: 89 MMHG | SYSTOLIC BLOOD PRESSURE: 145 MMHG | WEIGHT: 201.6 LBS | TEMPERATURE: 97.3 F | HEIGHT: 59 IN | OXYGEN SATURATION: 97 % | BODY MASS INDEX: 40.64 KG/M2

## 2023-01-17 DIAGNOSIS — F41.1 GENERALIZED ANXIETY DISORDER: ICD-10-CM

## 2023-01-17 DIAGNOSIS — F90.0 ADHD (ATTENTION DEFICIT HYPERACTIVITY DISORDER), INATTENTIVE TYPE: Primary | ICD-10-CM

## 2023-01-17 DIAGNOSIS — F33.1 MAJOR DEPRESSIVE DISORDER, RECURRENT, MODERATE: ICD-10-CM

## 2023-01-17 PROCEDURE — 99214 OFFICE O/P EST MOD 30 MIN: CPT

## 2023-01-17 RX ORDER — LAMOTRIGINE 100 MG/1
100 TABLET ORAL DAILY
Qty: 30 TABLET | Refills: 1 | Status: SHIPPED | OUTPATIENT
Start: 2023-01-17 | End: 2023-02-20 | Stop reason: SDUPTHER

## 2023-01-17 RX ORDER — METHYLPHENIDATE HYDROCHLORIDE 10 MG/1
10 TABLET ORAL 2 TIMES DAILY
Qty: 60 TABLET | Refills: 0 | Status: SHIPPED | OUTPATIENT
Start: 2023-01-17 | End: 2023-01-19 | Stop reason: SDUPTHER

## 2023-01-17 NOTE — PROGRESS NOTES
Subjective   Enriqueta Gotti is a 32 y.o. female who is here today for medication management follow-up appointment.    Chief Complaint: Anxiety.  ADHD.  Depression    HPI:  History of Present Illness     Patient denies negative side effects of current regimen. Patient reports that mood has been a little better. Reports continued situational stressors at home, but improved a little. Anxiety has reduced some with lamictal. She reports that she has been unable to get her Concerta from the pharmacy related to it being on back order. She notes often feeling overstimulated and overwhelmed.  Sleep is improved averaging 5 to 7 hours per night.  Appetite is unchanged.  1 pound gain since last encounter. Body mass index is 40.7 kg/m².  Denies self-harm.  Denies AVH.  Denies SI and HI.    Past Psych History: Inpatient admission x3; described above.  Patient reports previous outpatient medication management through [facility], [facility], most recent [facility].  Patient denies a history of self-harm.  Patient denies a history of TBI or seizures.  Patient reports previous diagnosis of anxiety, depression, bipolar disorder.    Previous Psych Meds: Patient reports that she has trialed several medications in the past but is unsure of them all.  She does endorse that she had negative side effects with Effexor, Zoloft, Depakote, and Wellbutrin.  She does identify that she has previously trialed Invega but experienced an increase in prolactin levels related to this. Gene site testing was conducted at the [facility].    Substance Abuse: Patient denies history or current illicit substance use, EtOH.  She does endorse daily nicotine use in the form of cigarettes x13 years approximating 0.5 to 1 pack/day with last reported use being today.  Patient does report caffeine intake approximating 16 to 32 ounces daily.    Social History: Patient identifies that she was born and raised in [place] until she was in second  grade.  Patient reports that primary custody until she was in  was to her biological father.  Identifies a strained relationship with biological mother.  Patient identifies that she was raised by paternal grandparents and adopted.  She identifies that after leaving [place] that she moved to [place], [place] then to [place], [place] then to [place].  She identifies that she then moved to [facility] x1 year.   3 times total; however, 2 marriages to the same person.  She identifies first marriage x10 months.  Second marriage 1 year.  Third marriage x3-1/2 years.  Endorses that first and third marriage is to current .  Reports previous relationships between marriage courses.  2 children.  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  High school graduate, some college.  Denies any previous or pending legal matters.      Family Psychiatric History:  family history includes ADD / ADHD in her child; Anxiety disorder in her mother; Asthma in an other family member; Bipolar disorder in her mother; Diabetes in an other family member; Heart disease in an other family member; Hypertension in an other family member; Kidney disease in an other family member; Osteoarthritis in an other family member; Osteoporosis in an other family member; Schizophrenia in her brother; Stroke in an other family member.    Medical/Surgical History:  Past Medical History:   Diagnosis Date   • ADHD (attention deficit hyperactivity disorder)    • Allergic    • Ankle sprain     right   • Anxiety    • Bipolar disorder (HCC)    • Carpal tunnel syndrome    • Chronic pain disorder     Fibroymyalgia   • Depression    • Suicide attempt (HCC)    • Tear of meniscus of knee     left      Past Surgical History:   Procedure Laterality Date   • CARPAL TUNNEL RELEASE     •  SECTION     • CHOLECYSTECTOMY     • LAPAROSCOPIC TUBAL LIGATION     • MOUTH SURGERY     • TONSILLECTOMY AND ADENOIDECTOMY     •  TUBAL ABDOMINAL LIGATION         Allergies   Allergen Reactions   • Codeine    • Penicillins    • Sulfa Antibiotics            Current Medications:   Current Outpatient Medications   Medication Sig Dispense Refill   • lamoTRIgine (LaMICtal) 100 MG tablet Take 1 tablet by mouth Daily for 30 days. 30 tablet 1   • hydrOXYzine (ATARAX) 25 MG tablet Take 1-2 tablets nightly as needed for sleep . 60 tablet 1   • methylphenidate (Ritalin) 10 MG tablet Take 1 tablet by mouth 2 (Two) Times a Day for 30 days. 60 tablet 0   • ProAir  (90 Base) MCG/ACT inhaler Inhale 2 puffs Every 6 (Six) Hours As Needed for Wheezing or Shortness of Air. 18 g 2   • vilazodone (Viibryd) 20 MG tablet tablet Take 1 tablet by mouth Daily for 90 days. 90 tablet 0     No current facility-administered medications for this visit.         Review of Systems   Constitutional: Positive for activity change. Negative for fatigue.   HENT: Negative for dental problem, drooling, ear pain and sore throat.    Eyes: Negative for redness and visual disturbance.   Respiratory: Negative for chest tightness.    Cardiovascular: Negative for chest pain and palpitations.   Gastrointestinal: Negative for abdominal pain, diarrhea and nausea.   Endocrine: Negative for polydipsia and polyuria.   Genitourinary: Negative for frequency and urgency.   Musculoskeletal: Negative for back pain and gait problem.   Skin: Negative for pallor and rash.   Allergic/Immunologic: Negative for environmental allergies and immunocompromised state.   Neurological: Negative for dizziness, tremors, seizures, syncope, facial asymmetry, speech difficulty, weakness, light-headedness, numbness and headaches.   Hematological: Negative for adenopathy. Does not bruise/bleed easily.   Psychiatric/Behavioral: Positive for decreased concentration. Negative for agitation, behavioral problems, confusion, dysphoric mood, hallucinations, self-injury, sleep disturbance and suicidal ideas. The patient is  "not nervous/anxious and is not hyperactive.     denies HEENT, cardiovascular, respiratory, liver, renal, GI/, endocrine, neuro, DERM, hematology, immunology, musculoskeletal disorders.    Objective   Physical Exam  Vitals reviewed.   Constitutional:       Appearance: Normal appearance. She is obese.   HENT:      Head: Normocephalic.      Nose: Nose normal.      Mouth/Throat:      Mouth: Mucous membranes are moist.      Pharynx: Oropharynx is clear.   Eyes:      Extraocular Movements: Extraocular movements intact.      Pupils: Pupils are equal, round, and reactive to light.   Cardiovascular:      Rate and Rhythm: Normal rate.   Pulmonary:      Effort: Pulmonary effort is normal.   Musculoskeletal:         General: Normal range of motion.      Cervical back: Normal range of motion.   Skin:     General: Skin is warm and dry.   Neurological:      General: No focal deficit present.      Mental Status: She is alert and oriented to person, place, and time. Mental status is at baseline.   Psychiatric:         Attention and Perception: Attention and perception normal.         Mood and Affect: Mood is not depressed. Affect is tearful.         Speech: Speech normal.         Behavior: Behavior normal. Behavior is cooperative.         Thought Content: Thought content normal.         Cognition and Memory: Cognition and memory normal.         Judgment: Judgment normal.       Blood pressure 145/89, pulse 99, temperature 97.3 °F (36.3 °C), height 149.9 cm (59.02\"), weight 91.4 kg (201 lb 9.6 oz), SpO2 97 %.    Mental Status Exam:   Hygiene:   good  Cooperation:  Cooperative  Eye Contact:  Good  Psychomotor Behavior:  Appropriate  Affect:  Appropriate  Hopelessness: Denies  Speech:  Normal  Thought Process:  Goal directed and Linear  Thought Content:  Normal and Mood congruent  Suicidal:  None  Homicidal:  None  Hallucinations:  None  Delusion:  None  Memory:  Intact  Orientation:  Person, Place, Time and Situation  Reliability:  " fair  Insight:  Fair  Judgement:  Fair  Impulse Control:  Fair  Physical/Medical Issues:  No       Short-term goals: Patient will be compliant with clinic appointments.  Patient will be engaged in therapy, medication compliant with minimal side effects. Patient  will report decrease of symptoms and frequency.    Long-term goals: Patient will have minimal symptoms of  with continued medication management. Patient will be compliant with treatment and appointments.     Strengths: Motivation for treatment, insight  Weaknesses: Support, strained family dynamics, recent loss    Functional Status: moderate impairment in areas of daily functioning.  Prognosis: Guarded dependent on medication/follow up and treatment plan compliance.    Enriqueta Gotti  reports that she has been smoking cigarettes. She started smoking about 7 years ago. She has a 3.00 pack-year smoking history. She has never used smokeless tobacco.. I have educated her on the risk of diseases from using tobacco products such as cancer, COPD, heart disease and reproductive problems.     I advised her to quit and she is not willing to quit.    I spent 3  minutes counseling the patient.    Assessment & Plan   Diagnoses and all orders for this visit:    1. ADHD (attention deficit hyperactivity disorder), inattentive type (Primary)  -     methylphenidate (Ritalin) 10 MG tablet; Take 1 tablet by mouth 2 (Two) Times a Day for 30 days.  Dispense: 60 tablet; Refill: 0    2. Generalized anxiety disorder  -     lamoTRIgine (LaMICtal) 100 MG tablet; Take 1 tablet by mouth Daily for 30 days.  Dispense: 30 tablet; Refill: 1    3. Major depressive disorder, recurrent, moderate (HCC)  -     lamoTRIgine (LaMICtal) 100 MG tablet; Take 1 tablet by mouth Daily for 30 days.  Dispense: 30 tablet; Refill: 1      -Elieser reviewed and appropriate    -Continue Viibryd 20 mg p.o. daily for anxiety and depression  -Increase Lamictal to 100 mg p.o. daily for mood  -Discontinue  Concerta related to backorder  -Start Ritalin 10 mg p.o. twice daily for ADHD  -Continue hydroxyzine 25 to 50 mg p.o. nightly as needed for anxiety and sleep  -Recommend psychotherapy with LCSW  -Medications sent to pharmacy at this time    Discussed medication and psychotherapy options.  Patient has noticed a positive improvement with the addition of Lamictal, but continues to feel symptom burden is negatively impacting day-to-day function.  She is to continue Viibryd for anxiety and depression.  I am going to increase Lamictal to 100 mg p.o. daily as adjunct for mood.  Concerta is unavailable at the pharmacy, going to transition to our Ritalin twice daily dosing as described above.  Hydroxyzine has been effective for anxiety reduction at night and sleep induction.Reintegrated side effects associated with the above medications, patient does verbalize understanding. Patient is aware to contact the [facility] with any worsening of symptom.  Patient is agreeable to go to the ER or call 911 should they begin SI/HI.      SUPPORTIVE PSYCHOTHERAPY: continuing efforts to promote the therapeutic alliance, address the patient’s issues, and strengthen self awareness, insights, and coping skills.Assisted patient in processing above session content; acknowledged and normalized patient’s thoughts, feelings, and concerns.  Applied  positive coping skills and behavior management in session.Allowed patient to freely discuss issues without interruption or judgment. Provided safe, confidential environment to facilitate the development of positive therapeutic relationship and encourage open, honest communication. Assisted patient in identifying risk factors which would indicate the need for higher level of care including thoughts to harm self or others and/or self-harming behavior and encouraged patient to contact this office, call 911, or present to the nearest emergency room should any of these events occur. Discussed crisis  intervention services and means to access.  Patient adamantly and convincingly denies current suicidal or homicidal ideation or perceptual disturbance.    Return in about 4 weeks (around 2/14/2023), or if symptoms worsen or fail to improve, for Next scheduled follow up.      This document has been electronically signed by DALILA Guthrie   January 17, 2023 13:30 EST   Errors in dictation may reflect use of voice recognition software and not all errors in transcription may have been detected prior to signing.

## 2023-01-18 ENCOUNTER — TELEPHONE (OUTPATIENT)
Dept: FAMILY MEDICINE CLINIC | Facility: CLINIC | Age: 33
End: 2023-01-18
Payer: MEDICAID

## 2023-01-19 DIAGNOSIS — F90.0 ADHD (ATTENTION DEFICIT HYPERACTIVITY DISORDER), INATTENTIVE TYPE: ICD-10-CM

## 2023-01-19 RX ORDER — METHYLPHENIDATE HYDROCHLORIDE 10 MG/1
10 TABLET ORAL 2 TIMES DAILY
Qty: 60 TABLET | Refills: 0 | Status: SHIPPED | OUTPATIENT
Start: 2023-01-19 | End: 2023-02-20 | Stop reason: SDUPTHER

## 2023-02-16 ENCOUNTER — OFFICE VISIT (OUTPATIENT)
Dept: FAMILY MEDICINE CLINIC | Facility: CLINIC | Age: 33
End: 2023-02-16
Payer: MEDICAID

## 2023-02-16 VITALS
OXYGEN SATURATION: 98 % | TEMPERATURE: 97.5 F | RESPIRATION RATE: 16 BRPM | HEART RATE: 92 BPM | SYSTOLIC BLOOD PRESSURE: 112 MMHG | WEIGHT: 204.8 LBS | HEIGHT: 59 IN | DIASTOLIC BLOOD PRESSURE: 64 MMHG | BODY MASS INDEX: 41.29 KG/M2

## 2023-02-16 DIAGNOSIS — L30.9 DERMATITIS: Primary | ICD-10-CM

## 2023-02-16 DIAGNOSIS — H10.13 ALLERGIC CONJUNCTIVITIS OF BOTH EYES: ICD-10-CM

## 2023-02-16 DIAGNOSIS — F17.200 SMOKER: ICD-10-CM

## 2023-02-16 PROCEDURE — 99214 OFFICE O/P EST MOD 30 MIN: CPT | Performed by: NURSE PRACTITIONER

## 2023-02-16 RX ORDER — PREDNISONE 20 MG/1
TABLET ORAL
Qty: 20 TABLET | Refills: 0 | Status: SHIPPED | OUTPATIENT
Start: 2023-02-16 | End: 2023-03-01

## 2023-02-16 RX ORDER — OLOPATADINE HYDROCHLORIDE 1 MG/ML
1 SOLUTION/ DROPS OPHTHALMIC 2 TIMES DAILY PRN
Qty: 5 ML | Refills: 0 | Status: SHIPPED | OUTPATIENT
Start: 2023-02-16

## 2023-02-16 RX ORDER — CETIRIZINE HYDROCHLORIDE 10 MG/1
10 TABLET ORAL DAILY
COMMUNITY
End: 2023-03-01 | Stop reason: SDUPTHER

## 2023-02-16 NOTE — PROGRESS NOTES
Chief Complaint  Itchy, Burning Skin and Itchy, Burning Eyes    Subjective          Enriqueta Gotti is a 32 y.o. female who presents today to [facility] for initial evaluation     HPI:   History of Present Illness    Presents for c/o itching to skin and eyes.  Associated symptoms: Eyes burn. Has red rash on arms and back.  Symptoms a few days ago. Denies any treatments. Denies any injury or trauma. Recently switched laundry detergent. No other issues or concerns at this time.    The following portions of the patient's history were reviewed and updated as appropriate: allergies, current medications, past family history, past medical history, past social history, past surgical history and problem list.    Objective     Allergy:   Allergies   Allergen Reactions   • Codeine    • Penicillins    • Sulfa Antibiotics         Current Medications:   Current Outpatient Medications   Medication Sig Dispense Refill   • cetirizine (zyrTEC) 10 MG tablet Take 10 mg by mouth Daily.     • hydrOXYzine (ATARAX) 25 MG tablet Take 1-2 tablets nightly as needed for sleep . 60 tablet 1   • lamoTRIgine (LaMICtal) 100 MG tablet Take 1 tablet by mouth Daily for 30 days. 30 tablet 1   • methylphenidate (Ritalin) 10 MG tablet Take 1 tablet by mouth 2 (Two) Times a Day for 30 days. 60 tablet 0   • ProAir  (90 Base) MCG/ACT inhaler Inhale 2 puffs Every 6 (Six) Hours As Needed for Wheezing or Shortness of Air. 18 g 2   • vilazodone (Viibryd) 20 MG tablet tablet Take 1 tablet by mouth Daily for 90 days. 90 tablet 0   • olopatadine (PATANOL) 0.1 % ophthalmic solution Administer 1 drop to both eyes 2 (Two) Times a Day As Needed for Allergies. 5 mL 0   • predniSONE (DELTASONE) 20 MG tablet Take 3 tablets by mouth Daily for 3 days, THEN 2 tablets Daily for 3 days, THEN 1 tablet Daily for 3 days, THEN 0.5 tablets Daily for 3 days. 20 tablet 0     No current facility-administered medications for this visit.  "      Past Medical History:  Past Medical History:   Diagnosis Date   • ADHD (attention deficit hyperactivity disorder)    • Allergic    • Ankle sprain     right   • Anxiety    • Bipolar disorder (HCC)    • Carpal tunnel syndrome    • Chronic pain disorder     Fibroymyalgia   • Depression    • Suicide attempt (HCC)    • Tear of meniscus of knee     left        Past Surgical History:  Past Surgical History:   Procedure Laterality Date   • CARPAL TUNNEL RELEASE     •  SECTION     • CHOLECYSTECTOMY     • LAPAROSCOPIC TUBAL LIGATION     • MOUTH SURGERY     • TONSILLECTOMY AND ADENOIDECTOMY     • TUBAL ABDOMINAL LIGATION         Social History:  Social History     Socioeconomic History   • Marital status:    Tobacco Use   • Smoking status: Every Day     Packs/day: 1.00     Years: 3.00     Pack years: 3.00     Types: Cigarettes     Start date: 2016   • Smokeless tobacco: Never   • Tobacco comments:     Trying to quit.   Vaping Use   • Vaping Use: Never used   Substance and Sexual Activity   • Alcohol use: Yes     Comment: rarely   • Drug use: No   • Sexual activity: Defer       Family History:  Family History   Problem Relation Age of Onset   • Anxiety disorder Mother    • Bipolar disorder Mother    • Schizophrenia Brother    • Osteoarthritis Other    • Osteoporosis Other    • Heart disease Other    • Hypertension Other    • Asthma Other    • Diabetes Other    • Kidney disease Other    • Stroke Other    • ADD / ADHD Child        Review of Systems:  Review of Systems   Constitutional: Negative for fever.   HENT: Negative for ear discharge, facial swelling and trouble swallowing.    Eyes: Negative for photophobia, pain and visual disturbance.   Respiratory: Negative for shortness of breath and wheezing.        Vital Signs:   /64 (BP Location: Right arm, Patient Position: Sitting, Cuff Size: Large Adult)   Pulse 92   Temp 97.5 °F (36.4 °C) (Temporal)   Resp 16   Ht 149.9 cm (59\")   Wt 92.9 kg " (204 lb 12.8 oz)   SpO2 98%   BMI 41.36 kg/m²      Physical Exam:  Physical Exam  Vitals and nursing note reviewed.   Constitutional:       General: She is not in acute distress.     Appearance: Normal appearance. She is not ill-appearing or toxic-appearing.   HENT:      Head: Normocephalic.      Right Ear: Tympanic membrane, ear canal and external ear normal.      Left Ear: Tympanic membrane, ear canal and external ear normal.      Nose: Nose normal.      Mouth/Throat:      Mouth: Mucous membranes are moist.      Pharynx: Oropharynx is clear.   Eyes:      General: Lids are normal.      Conjunctiva/sclera:      Right eye: Right conjunctiva is not injected.      Left eye: Left conjunctiva is not injected.      Comments: Watery discharge.     Cardiovascular:      Rate and Rhythm: Normal rate and regular rhythm.      Heart sounds: Normal heart sounds.   Pulmonary:      Effort: Pulmonary effort is normal. No respiratory distress.      Breath sounds: Normal breath sounds.   Lymphadenopathy:      Cervical: No cervical adenopathy.   Skin:     General: Skin is warm and dry.      Coloration: Skin is not pale.      Comments: Scattered erythematous papular rash to bilateral arms, neck, back   Neurological:      Mental Status: She is alert and oriented to person, place, and time.   Psychiatric:         Mood and Affect: Mood normal.         Behavior: Behavior normal.         Thought Content: Thought content normal.         Judgment: Judgment normal.                  Assessment and Plan   Diagnoses and all orders for this visit:    1. Dermatitis (Primary)  -     olopatadine (PATANOL) 0.1 % ophthalmic solution; Administer 1 drop to both eyes 2 (Two) Times a Day As Needed for Allergies.  Dispense: 5 mL; Refill: 0  -     predniSONE (DELTASONE) 20 MG tablet; Take 3 tablets by mouth Daily for 3 days, THEN 2 tablets Daily for 3 days, THEN 1 tablet Daily for 3 days, THEN 0.5 tablets Daily for 3 days.  Dispense: 20 tablet; Refill:  0    2. Allergic conjunctivitis of both eyes  -     olopatadine (PATANOL) 0.1 % ophthalmic solution; Administer 1 drop to both eyes 2 (Two) Times a Day As Needed for Allergies.  Dispense: 5 mL; Refill: 0    3. Smoker    Avoid irritants.  Skin and eye care as directed.  Stop smoking.  Declines at this time.    Discussed possible differential diagnoses, testing, treatment, recommended non-pharmacological interventions, risks, warning signs to monitor for that would indicate need for follow-up in clinic or ER. If no improvement with these regimens or you have new or worsening symptoms follow-up. Patient verbalizes understanding and agreement with plan of care. Denies further needs or concerns.     Patient was given instructions and counseling regarding her condition and for health maintenance advised.    Class 3 Severe Obesity (BMI >=40). Obesity-related health conditions include the following: obstructive sleep apnea, hypertension, coronary heart disease, diabetes mellitus, dyslipidemias, GERD and peripheral vascular disease. Obesity is worsening. BMI is is above average; BMI management plan is completed. We discussed portion control and increasing exercise.       I spent 30 minutes caring for patient on this date of service. This time includes time spent by me in the following activities: preparing for the visit, reviewing tests, obtaining and/or reviewing a separately obtained history, performing a medically appropriate examination and/or evaluation, counseling and educating the patient/family/caregiver, ordering medications, tests, or procedures and documenting information in the medical record    Meds ordered during this visit:  New Medications Ordered This Visit   Medications   • olopatadine (PATANOL) 0.1 % ophthalmic solution     Sig: Administer 1 drop to both eyes 2 (Two) Times a Day As Needed for Allergies.     Dispense:  5 mL     Refill:  0   • predniSONE (DELTASONE) 20 MG tablet     Sig: Take 3 tablets by  mouth Daily for 3 days, THEN 2 tablets Daily for 3 days, THEN 1 tablet Daily for 3 days, THEN 0.5 tablets Daily for 3 days.     Dispense:  20 tablet     Refill:  0       Patient Instructions:  Patient instructions given for the following visit diagnosis:    ICD-10-CM ICD-9-CM   1. Dermatitis  L30.9 692.9   2. Allergic conjunctivitis of both eyes  H10.13 372.14   3. Smoker  F17.200 305.1       Follow Up   Return for Recheck in 1 week, sooner if needed .        This document has been electronically signed by DALILA Juan  February 16, 2023 08:59 EST    Patient was given instructions and counseling regarding her condition or for health maintenance advice. Please see specific information pulled into the AVS if appropriate.     Part of this note may be an electronic transcription/translation of spoken language to printed text using the Dragon Dictation System.

## 2023-02-20 ENCOUNTER — OFFICE VISIT (OUTPATIENT)
Dept: PSYCHIATRY | Facility: CLINIC | Age: 33
End: 2023-02-20
Payer: MEDICAID

## 2023-02-20 VITALS
DIASTOLIC BLOOD PRESSURE: 81 MMHG | HEIGHT: 59 IN | TEMPERATURE: 97.7 F | WEIGHT: 201.6 LBS | BODY MASS INDEX: 40.64 KG/M2 | HEART RATE: 102 BPM | SYSTOLIC BLOOD PRESSURE: 117 MMHG | OXYGEN SATURATION: 97 %

## 2023-02-20 DIAGNOSIS — F90.0 ADHD (ATTENTION DEFICIT HYPERACTIVITY DISORDER), INATTENTIVE TYPE: ICD-10-CM

## 2023-02-20 DIAGNOSIS — F41.1 GENERALIZED ANXIETY DISORDER: ICD-10-CM

## 2023-02-20 DIAGNOSIS — F33.1 MAJOR DEPRESSIVE DISORDER, RECURRENT, MODERATE: ICD-10-CM

## 2023-02-20 PROCEDURE — 99214 OFFICE O/P EST MOD 30 MIN: CPT

## 2023-02-20 RX ORDER — LAMOTRIGINE 150 MG/1
150 TABLET ORAL DAILY
Qty: 30 TABLET | Refills: 1 | Status: SHIPPED | OUTPATIENT
Start: 2023-02-20 | End: 2023-03-22

## 2023-02-20 RX ORDER — HYDROXYZINE HYDROCHLORIDE 25 MG/1
TABLET, FILM COATED ORAL
Qty: 90 TABLET | Refills: 0 | Status: SHIPPED | OUTPATIENT
Start: 2023-02-20

## 2023-02-20 RX ORDER — METHYLPHENIDATE HYDROCHLORIDE 10 MG/1
10 TABLET ORAL 2 TIMES DAILY
Qty: 60 TABLET | Refills: 0 | Status: SHIPPED | OUTPATIENT
Start: 2023-02-20 | End: 2023-03-22

## 2023-02-20 NOTE — PROGRESS NOTES
Subjective   Enriqueta Gotti is a 32 y.o. female who is here today for medication management follow-up appointment.    Chief Complaint: Anxiety.  ADHD.  Depression    HPI:  History of Present Illness     Patient denies negative side effects of current regimen.  Patient reports continued difficulty with strained family dynamics.  February is the death anniversary of her mother.  She reports that despite stressors mood has been more level with the addition of Lamictal.  She reports that she feels Lamictal and restarting Ritalin has been beneficial for anxiety.  She denies any episodes of panic.  Reports that she is still continuing to work on setting and maintaining boundaries.  Feels that times of being overwhelmed have reduced.  Depression symptoms are beginning to improve.  Energy levels are starting to improve.  Sadness is beginning to reduce.  Sleep is well with hydroxyzine at 6 to 7 hours per night.  Appetite is vastly unchanged.  2-3 meals per day.  3 pounds loss since last encounter. Body mass index is 40.7 kg/m². Denies self-harm.  Denies AVH.  Denies SI and HI.    Past Psych History: Inpatient admission x3; described above.  Patient reports previous outpatient medication management through [facility], [facility], most recent [place] [facility].  Patient denies a history of self-harm.  Patient denies a history of TBI or seizures.  Patient reports previous diagnosis of anxiety, depression, bipolar disorder.    Previous Psych Meds: Patient reports that she has trialed several medications in the past but is unsure of them all.  She does endorse that she had negative side effects with Effexor, Zoloft, Depakote, and Wellbutrin.  She does identify that she has previously trialed Invega but experienced an increase in prolactin levels related to this. Gene site testing was conducted at the [facility].    Substance Abuse: Patient denies history or current illicit substance use, EtOH.  She does endorse  daily nicotine use in the form of cigarettes x13 years approximating 0.5 to 1 pack/day with last reported use being today.  Patient does report caffeine intake approximating 16 to 32 ounces daily.    Social History: Patient identifies that she was born and raised in [place] until she was in second grade.  Patient reports that primary custody until she was in  was to her biological father.  Identifies a strained relationship with biological mother.  Patient identifies that she was raised by paternal grandparents and adopted.  She identifies that after leaving [place] that she moved to [place], [place] then to [place], [place] then to [place].  She identifies that she then moved to [facility] x1 year.   3 times total; however, 2 marriages to the same person.  She identifies first marriage x10 months.  Second marriage 1 year.  Third marriage x3-1/2 years.  Endorses that first and third marriage is to current .  Reports previous relationships between marriage courses.  2 children.  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  High school graduate, some college.  Denies any previous or pending legal matters.      Family Psychiatric History:  family history includes ADD / ADHD in her child; Anxiety disorder in her mother; Asthma in an other family member; Bipolar disorder in her mother; Diabetes in an other family member; Heart disease in an other family member; Hypertension in an other family member; Kidney disease in an other family member; Osteoarthritis in an other family member; Osteoporosis in an other family member; Schizophrenia in her brother; Stroke in an other family member.    Medical/Surgical History:  Past Medical History:   Diagnosis Date   • ADHD (attention deficit hyperactivity disorder)    • Allergic    • Ankle sprain     right   • Anxiety    • Bipolar disorder (HCC)    • Carpal tunnel syndrome    • Chronic pain disorder     Fibroymyalgia   •  Depression    • Suicide attempt (HCC)    • Tear of meniscus of knee     left      Past Surgical History:   Procedure Laterality Date   • CARPAL TUNNEL RELEASE     •  SECTION     • CHOLECYSTECTOMY     • LAPAROSCOPIC TUBAL LIGATION     • MOUTH SURGERY     • TONSILLECTOMY AND ADENOIDECTOMY     • TUBAL ABDOMINAL LIGATION         Allergies   Allergen Reactions   • Codeine    • Penicillins    • Sulfa Antibiotics            Current Medications:   Current Outpatient Medications   Medication Sig Dispense Refill   • hydrOXYzine (ATARAX) 25 MG tablet Take 1-2 tablets nightly as needed for sleep . 90 tablet 0   • lamoTRIgine (LaMICtal) 150 MG tablet Take 1 tablet by mouth Daily for 30 days. 30 tablet 1   • methylphenidate (Ritalin) 10 MG tablet Take 1 tablet by mouth 2 (Two) Times a Day for 30 days. 60 tablet 0   • cetirizine (zyrTEC) 10 MG tablet Take 10 mg by mouth Daily.     • olopatadine (PATANOL) 0.1 % ophthalmic solution Administer 1 drop to both eyes 2 (Two) Times a Day As Needed for Allergies. 5 mL 0   • predniSONE (DELTASONE) 20 MG tablet Take 3 tablets by mouth Daily for 3 days, THEN 2 tablets Daily for 3 days, THEN 1 tablet Daily for 3 days, THEN 0.5 tablets Daily for 3 days. 20 tablet 0   • ProAir  (90 Base) MCG/ACT inhaler Inhale 2 puffs Every 6 (Six) Hours As Needed for Wheezing or Shortness of Air. 18 g 2   • vilazodone (Viibryd) 20 MG tablet tablet Take 1 tablet by mouth Daily for 90 days. 90 tablet 0     No current facility-administered medications for this visit.         Review of Systems   Constitutional: Positive for activity change. Negative for fatigue.   HENT: Negative for dental problem, drooling, ear pain and sore throat.    Eyes: Negative for redness and visual disturbance.   Respiratory: Negative for chest tightness.    Cardiovascular: Negative for chest pain and palpitations.   Gastrointestinal: Negative for abdominal pain, diarrhea and nausea.   Endocrine: Negative for polydipsia and  polyuria.   Genitourinary: Negative for frequency and urgency.   Musculoskeletal: Negative for back pain and gait problem.   Skin: Negative for pallor and rash.   Allergic/Immunologic: Negative for environmental allergies and immunocompromised state.   Neurological: Negative for dizziness, tremors, seizures, syncope, facial asymmetry, speech difficulty, weakness, light-headedness, numbness and headaches.   Hematological: Negative for adenopathy. Does not bruise/bleed easily.   Psychiatric/Behavioral: Positive for decreased concentration. Negative for agitation, behavioral problems, confusion, dysphoric mood, hallucinations, self-injury, sleep disturbance and suicidal ideas. The patient is not nervous/anxious and is not hyperactive.     denies HEENT, cardiovascular, respiratory, liver, renal, GI/, endocrine, neuro, DERM, hematology, immunology, musculoskeletal disorders.    Objective   Physical Exam  Vitals reviewed.   Constitutional:       Appearance: Normal appearance. She is obese.   HENT:      Head: Normocephalic.      Nose: Nose normal.      Mouth/Throat:      Mouth: Mucous membranes are moist.      Pharynx: Oropharynx is clear.   Eyes:      Extraocular Movements: Extraocular movements intact.      Pupils: Pupils are equal, round, and reactive to light.   Cardiovascular:      Rate and Rhythm: Normal rate.   Pulmonary:      Effort: Pulmonary effort is normal.   Musculoskeletal:         General: Normal range of motion.      Cervical back: Normal range of motion.   Skin:     General: Skin is warm and dry.   Neurological:      General: No focal deficit present.      Mental Status: She is alert and oriented to person, place, and time. Mental status is at baseline.   Psychiatric:         Attention and Perception: Attention and perception normal.         Mood and Affect: Mood is not depressed. Affect is tearful.         Speech: Speech normal.         Behavior: Behavior normal. Behavior is cooperative.         Thought  "Content: Thought content normal.         Cognition and Memory: Cognition and memory normal.         Judgment: Judgment normal.       Blood pressure 117/81, pulse 102, temperature 97.7 °F (36.5 °C), height 149.9 cm (59.02\"), weight 91.4 kg (201 lb 9.6 oz), SpO2 97 %.    Mental Status Exam:   Hygiene:   good  Cooperation:  Cooperative  Eye Contact:  Good  Psychomotor Behavior:  Appropriate  Affect:  Appropriate  Hopelessness: Denies  Speech:  Normal  Thought Process:  Goal directed and Linear  Thought Content:  Normal and Mood congruent  Suicidal:  None  Homicidal:  None  Hallucinations:  None  Delusion:  None  Memory:  Intact  Orientation:  Person, Place, Time and Situation  Reliability:  fair  Insight:  Fair  Judgement:  Fair  Impulse Control:  Fair  Physical/Medical Issues:  No       Short-term goals: Patient will be compliant with clinic appointments.  Patient will be engaged in therapy, medication compliant with minimal side effects. Patient  will report decrease of symptoms and frequency.    Long-term goals: Patient will have minimal symptoms of  with continued medication management. Patient will be compliant with treatment and appointments.     Strengths: Motivation for treatment, insight  Weaknesses: Support, strained family dynamics, recent loss    Functional Status: moderate impairment in areas of daily functioning.  Prognosis: Guarded dependent on medication/follow up and treatment plan compliance.    Enriqueta Gotti  reports that she has been smoking cigarettes. She started smoking about 7 years ago. She has a 3.00 pack-year smoking history. She has never used smokeless tobacco.. I have educated her on the risk of diseases from using tobacco products such as cancer, COPD, heart disease and reproductive problems.     I advised her to quit and she is not willing to quit.    I spent 3  minutes counseling the patient.    Assessment & Plan   Diagnoses and all orders for this visit:    1. ADHD (attention " deficit hyperactivity disorder), inattentive type  -     methylphenidate (Ritalin) 10 MG tablet; Take 1 tablet by mouth 2 (Two) Times a Day for 30 days.  Dispense: 60 tablet; Refill: 0    2. Generalized anxiety disorder  -     lamoTRIgine (LaMICtal) 150 MG tablet; Take 1 tablet by mouth Daily for 30 days.  Dispense: 30 tablet; Refill: 1  -     hydrOXYzine (ATARAX) 25 MG tablet; Take 1-2 tablets nightly as needed for sleep .  Dispense: 90 tablet; Refill: 0    3. Major depressive disorder, recurrent, moderate (HCC)  -     lamoTRIgine (LaMICtal) 150 MG tablet; Take 1 tablet by mouth Daily for 30 days.  Dispense: 30 tablet; Refill: 1         -UDS collected and pending  -Elieser reviewed and appropriate    -Continue Viibryd 20 mg p.o. daily for anxiety and depression  -Increase Lamictal to 150 mg p.o. daily for mood  -Continue Ritalin 10 mg p.o. twice daily for ADHD  -Continue hydroxyzine 25 to 50 mg p.o. nightly as needed for anxiety and sleep  -Recommend psychotherapy with LCSW  -Medications sent to pharmacy at this time    Discussed medication and psychotherapy options.  Patient does feel that she is getting closer to desired baseline.  She is to continue Viibryd, Ritalin, and hydroxyzine without change.  I would increase Lamictal as described above to better target anxiety.Reintegrated side effects associated with the above medications, patient does verbalize understanding. Patient is aware to contact the [facility] with any worsening of symptom.  Patient is agreeable to go to the ER or call 911 should they begin SI/HI.      SUPPORTIVE PSYCHOTHERAPY: continuing efforts to promote the therapeutic alliance, address the patient’s issues, and strengthen self awareness, insights, and coping skills.Assisted patient in processing above session content; acknowledged and normalized patient’s thoughts, feelings, and concerns.  Applied  positive coping skills and behavior management in session.Allowed patient to freely  discuss issues without interruption or judgment. Provided safe, confidential environment to facilitate the development of positive therapeutic relationship and encourage open, honest communication. Assisted patient in identifying risk factors which would indicate the need for higher level of care including thoughts to harm self or others and/or self-harming behavior and encouraged patient to contact this office, call 911, or present to the nearest emergency room should any of these events occur. Discussed crisis intervention services and means to access.  Patient adamantly and convincingly denies current suicidal or homicidal ideation or perceptual disturbance.    Return in about 6 weeks (around 4/3/2023), or if symptoms worsen or fail to improve, for Next scheduled follow up.      This document has been electronically signed by DALILA Guthrie   February 20, 2023 15:12 EST   Errors in dictation may reflect use of voice recognition software and not all errors in transcription may have been detected prior to signing.     0 = independent

## 2023-03-01 ENCOUNTER — OFFICE VISIT (OUTPATIENT)
Dept: FAMILY MEDICINE CLINIC | Facility: CLINIC | Age: 33
End: 2023-03-01
Payer: MEDICAID

## 2023-03-01 VITALS
BODY MASS INDEX: 40.92 KG/M2 | SYSTOLIC BLOOD PRESSURE: 108 MMHG | WEIGHT: 203 LBS | RESPIRATION RATE: 16 BRPM | HEART RATE: 78 BPM | OXYGEN SATURATION: 99 % | HEIGHT: 59 IN | DIASTOLIC BLOOD PRESSURE: 70 MMHG | TEMPERATURE: 97.8 F

## 2023-03-01 DIAGNOSIS — F17.200 SMOKER: ICD-10-CM

## 2023-03-01 DIAGNOSIS — T78.40XD ALLERGY, SUBSEQUENT ENCOUNTER: ICD-10-CM

## 2023-03-01 DIAGNOSIS — Z00.00 ANNUAL PHYSICAL EXAM: Primary | ICD-10-CM

## 2023-03-01 DIAGNOSIS — Z11.59 ENCOUNTER FOR HEPATITIS C SCREENING TEST FOR LOW RISK PATIENT: ICD-10-CM

## 2023-03-01 DIAGNOSIS — Z01.82 ENCOUNTER FOR ALLERGY TESTING: ICD-10-CM

## 2023-03-01 DIAGNOSIS — J45.20 MILD INTERMITTENT ASTHMA WITHOUT COMPLICATION: ICD-10-CM

## 2023-03-01 DIAGNOSIS — E66.01 MORBID OBESITY: ICD-10-CM

## 2023-03-01 PROCEDURE — 2014F MENTAL STATUS ASSESS: CPT | Performed by: NURSE PRACTITIONER

## 2023-03-01 PROCEDURE — 99395 PREV VISIT EST AGE 18-39: CPT | Performed by: NURSE PRACTITIONER

## 2023-03-01 PROCEDURE — 90677 PCV20 VACCINE IM: CPT | Performed by: NURSE PRACTITIONER

## 2023-03-01 PROCEDURE — 90471 IMMUNIZATION ADMIN: CPT | Performed by: NURSE PRACTITIONER

## 2023-03-01 PROCEDURE — 3008F BODY MASS INDEX DOCD: CPT | Performed by: NURSE PRACTITIONER

## 2023-03-01 RX ORDER — CETIRIZINE HYDROCHLORIDE 10 MG/1
10 TABLET ORAL DAILY
Qty: 90 TABLET | Refills: 1 | Status: SHIPPED | OUTPATIENT
Start: 2023-03-01

## 2023-03-01 NOTE — PROGRESS NOTES
Subjective   Enriqueta Gotti is a 32 y.o. female.     Chief Complaint   Patient presents with   • Annual Exam     History of Present Illness     Presents for annual physical. Needs medication refills. Reports she tolerates medications well with no issues or side effects.   Requesting referral for allergy testing.  Denies any associated symptoms at this time.  Denies any other issues or concerns at this time.     HISTORY    Past Medical History:  Past Medical History:   Diagnosis Date   • ADHD (attention deficit hyperactivity disorder)    • Allergic    • Ankle sprain     right   • Anxiety    • Bipolar disorder (HCC)    • Carpal tunnel syndrome    • Chronic pain disorder     Fibroymyalgia   • Depression    • Suicide attempt (HCC)    • Tear of meniscus of knee     left        Past Surgical History:  Past Surgical History:   Procedure Laterality Date   • CARPAL TUNNEL RELEASE     •  SECTION     • CHOLECYSTECTOMY     • LAPAROSCOPIC TUBAL LIGATION     • MOUTH SURGERY     • TONSILLECTOMY AND ADENOIDECTOMY     • TUBAL ABDOMINAL LIGATION         Social History:  Social History     Socioeconomic History   • Marital status:    Tobacco Use   • Smoking status: Every Day     Packs/day: 1.00     Years: 3.00     Pack years: 3.00     Types: Cigarettes     Start date: 2016   • Smokeless tobacco: Never   • Tobacco comments:     Trying to quit.   Vaping Use   • Vaping Use: Never used   Substance and Sexual Activity   • Alcohol use: Yes     Comment: rarely   • Drug use: No   • Sexual activity: Defer       Family History:  Family History   Problem Relation Age of Onset   • Anxiety disorder Mother    • Bipolar disorder Mother    • Schizophrenia Brother    • Osteoarthritis Other    • Osteoporosis Other    • Heart disease Other    • Hypertension Other    • Asthma Other    • Diabetes Other    • Kidney disease Other    • Stroke Other    • ADD / ADHD Child        PHQ-9 Depression Screening  Little interest or  "pleasure in doing things? 0-->not at all   Feeling down, depressed, or hopeless? 1-->several days   Trouble falling or staying asleep, or sleeping too much?     Feeling tired or having little energy?     Poor appetite or overeating?     Feeling bad about yourself - or that you are a failure or have let yourself or your family down?     Trouble concentrating on things, such as reading the newspaper or watching television?     Moving or speaking so slowly that other people could have noticed? Or the opposite - being so fidgety or restless that you have been moving around a lot more than usual?     Thoughts that you would be better off dead, or of hurting yourself in some way?     PHQ-9 Total Score 1   If you checked off any problems, how difficult have these problems made it for you to do your work, take care of things at home, or get along with other people?  Not     Review of Systems  No pertinent positives    Objective     /70 (BP Location: Right arm, Patient Position: Sitting, Cuff Size: Large Adult)   Pulse 78   Temp 97.8 °F (36.6 °C) (Temporal)   Resp 16   Ht 149.9 cm (59\")   Wt 92.1 kg (203 lb)   LMP 02/05/2023   SpO2 99%   BMI 41.00 kg/m²     Physical Exam  Vitals and nursing note reviewed.   Constitutional:       General: She is not in acute distress.     Appearance: Normal appearance. She is obese. She is not ill-appearing or toxic-appearing.   HENT:      Head: Normocephalic.      Right Ear: Tympanic membrane, ear canal and external ear normal.      Left Ear: Tympanic membrane, ear canal and external ear normal.      Nose: Nose normal.      Mouth/Throat:      Mouth: Mucous membranes are moist.      Pharynx: Oropharynx is clear.   Eyes:      Extraocular Movements: Extraocular movements intact.      Conjunctiva/sclera: Conjunctivae normal.      Pupils: Pupils are equal, round, and reactive to light.   Neck:      Vascular: No carotid bruit.   Cardiovascular:      Rate and Rhythm: Normal rate and " regular rhythm.      Pulses: Normal pulses.      Heart sounds: Normal heart sounds.   Pulmonary:      Effort: Pulmonary effort is normal. No respiratory distress.      Breath sounds: Normal breath sounds.   Abdominal:      General: Bowel sounds are normal.      Palpations: Abdomen is soft.   Musculoskeletal:         General: Normal range of motion.      Cervical back: Normal range of motion and neck supple.   Lymphadenopathy:      Cervical: No cervical adenopathy.   Skin:     General: Skin is warm and dry.      Capillary Refill: Capillary refill takes less than 2 seconds.      Coloration: Skin is not pale.   Neurological:      General: No focal deficit present.      Mental Status: She is alert and oriented to person, place, and time.   Psychiatric:         Mood and Affect: Mood normal.         Behavior: Behavior normal.         Thought Content: Thought content normal.         Judgment: Judgment normal.           Assessment & Plan     Vision Screening    Right eye Left eye Both eyes   Without correction 20/20 20/70 20/25   With correction          No flowsheet data found.      Diagnoses and all orders for this visit:    1. Annual physical exam (Primary)  -     CBC & Differential  -     Comprehensive Metabolic Panel  -     Hemoglobin A1c  -     Lipid Panel  -     TSH Rfx On Abnormal To Free T4  -     HCV Antibody Rfx To Qnt PCR; Future    2. Mild intermittent asthma without complication  -     ProAir  (90 Base) MCG/ACT inhaler; Inhale 2 puffs Every 6 (Six) Hours As Needed for Wheezing or Shortness of Air.  Dispense: 18 g; Refill: 2  -     CBC & Differential  -     Comprehensive Metabolic Panel  -     Hemoglobin A1c  -     Lipid Panel  -     TSH Rfx On Abnormal To Free T4  -     HCV Antibody Rfx To Qnt PCR; Future    3. Encounter for allergy testing  -     Ambulatory Referral to Allergy    4. Encounter for hepatitis C screening test for low risk patient  -     HCV Antibody Rfx To Qnt PCR; Future    5. Allergy,  subsequent encounter  -     cetirizine (zyrTEC) 10 MG tablet; Take 1 tablet by mouth Daily.  Dispense: 90 tablet; Refill: 1  -     Ambulatory Referral to Allergy    6. Smoker  -     Pneumococcal Conjugate Vaccine 20-Valent (PCV20)    7. Morbid obesity (HCC)    Follow-up annually for physical.  Agreeable to fasting and screening labs  Stop smoking.  Declines at this time.  Is agreeable to pneumonia vaccine.  Keep appointment for eye exam as scheduled on 3/27/2023.  Diet and lifestyle modifications to control conditions.      Class 3 Severe Obesity (BMI >=40). Obesity-related health conditions include the following: obstructive sleep apnea, hypertension, coronary heart disease, diabetes mellitus, dyslipidemias, GERD and peripheral vascular disease. Obesity is worsening. BMI is is above average; BMI management plan is completed. We discussed portion control and increasing exercise.     Enriqueta Gotti  reports that she has been smoking cigarettes. She started smoking about 7 years ago. She has a 3.00 pack-year smoking history. She has never used smokeless tobacco.. I have educated her on the risk of diseases from using tobacco products such as cancer, COPD and heart disease.     I advised her to quit and she is not willing to quit.    I spent 3  minutes counseling the patient.    Understands disease processes and need for medications.  Understands reasons for urgent and emergent care.  Patient (& family) verbalized agreement for treatment plan.   Emotional support and active listening provided.  Patient provided time to verbalize feelings.  Counseling provided today including importance of good nutrition, exercise as tolerated, dental health, stress reduction and mental health. Importance of immunizations discussed.   Appropriate screenings based on gender (paps, breast exam, mammogram, PSA, colon screens, etc).     Counseled on safe sex practices and STD prevention.   Counseling regarding gun and water safety,  domestic violence, and seatbelt use.      I spent 30 minutes caring for patient on this date of service. This time includes time spent by me in the following activities: preparing for the visit, reviewing tests, obtaining and/or reviewing a separately obtained history, performing a medically appropriate examination and/or evaluation, counseling and educating the patient/family/caregiver, ordering medications, tests, or procedures and documenting information in the medical record.     Patient/guardian was given instructions and counseling regarding her condition and for health maintenance advised.         Patient Instructions:  Patient instructions given for the following visit diagnosis:    ICD-10-CM ICD-9-CM   1. Annual physical exam  Z00.00 V70.0   2. Mild intermittent asthma without complication  J45.20 493.90   3. Encounter for allergy testing  Z01.82 V72.7   4. Encounter for hepatitis C screening test for low risk patient  Z11.59 V73.89   5. Allergy, subsequent encounter  T78.40XD V58.89   6. Smoker  F17.200 305.1   7. Morbid obesity (HCC)  E66.01 278.01       Follow Up   Return in about 3 months (around 6/1/2023) for Recheck, Sooner if needed.         This document has been electronically signed by DALILA Juan  March 1, 2023 11:40 EST    Dragon disclaimer:    Part of this note may be an electronic transcription/translation of spoken language to printed text using the Dragon Dictation System.

## 2023-03-03 ENCOUNTER — LAB (OUTPATIENT)
Dept: FAMILY MEDICINE CLINIC | Facility: CLINIC | Age: 33
End: 2023-03-03
Payer: MEDICAID

## 2023-03-03 DIAGNOSIS — Z11.59 ENCOUNTER FOR HEPATITIS C SCREENING TEST FOR LOW RISK PATIENT: ICD-10-CM

## 2023-03-03 DIAGNOSIS — J45.20 MILD INTERMITTENT ASTHMA WITHOUT COMPLICATION: ICD-10-CM

## 2023-03-03 DIAGNOSIS — Z00.00 ANNUAL PHYSICAL EXAM: ICD-10-CM

## 2023-03-03 PROCEDURE — 83036 HEMOGLOBIN GLYCOSYLATED A1C: CPT | Performed by: NURSE PRACTITIONER

## 2023-03-03 PROCEDURE — 36415 COLL VENOUS BLD VENIPUNCTURE: CPT

## 2023-03-03 PROCEDURE — 80050 GENERAL HEALTH PANEL: CPT | Performed by: NURSE PRACTITIONER

## 2023-03-03 PROCEDURE — 86803 HEPATITIS C AB TEST: CPT | Performed by: NURSE PRACTITIONER

## 2023-03-03 PROCEDURE — 80061 LIPID PANEL: CPT | Performed by: NURSE PRACTITIONER

## 2023-03-04 LAB
ALBUMIN SERPL-MCNC: 4.3 G/DL (ref 3.5–5.2)
ALBUMIN/GLOB SERPL: 1.4 G/DL
ALP SERPL-CCNC: 77 U/L (ref 39–117)
ALT SERPL W P-5'-P-CCNC: 22 U/L (ref 1–33)
ANION GAP SERPL CALCULATED.3IONS-SCNC: 8 MMOL/L (ref 5–15)
AST SERPL-CCNC: 16 U/L (ref 1–32)
BASOPHILS # BLD AUTO: 0.07 10*3/MM3 (ref 0–0.2)
BASOPHILS NFR BLD AUTO: 0.5 % (ref 0–1.5)
BILIRUB SERPL-MCNC: 0.5 MG/DL (ref 0–1.2)
BUN SERPL-MCNC: 11 MG/DL (ref 6–20)
BUN/CREAT SERPL: 13.3 (ref 7–25)
CALCIUM SPEC-SCNC: 9.5 MG/DL (ref 8.6–10.5)
CHLORIDE SERPL-SCNC: 106 MMOL/L (ref 98–107)
CHOLEST SERPL-MCNC: 160 MG/DL (ref 0–200)
CO2 SERPL-SCNC: 25 MMOL/L (ref 22–29)
CREAT SERPL-MCNC: 0.83 MG/DL (ref 0.57–1)
DEPRECATED RDW RBC AUTO: 40.6 FL (ref 37–54)
EGFRCR SERPLBLD CKD-EPI 2021: 95 ML/MIN/1.73
EOSINOPHIL # BLD AUTO: 0.33 10*3/MM3 (ref 0–0.4)
EOSINOPHIL NFR BLD AUTO: 2.5 % (ref 0.3–6.2)
ERYTHROCYTE [DISTWIDTH] IN BLOOD BY AUTOMATED COUNT: 13.2 % (ref 12.3–15.4)
GLOBULIN UR ELPH-MCNC: 3.1 GM/DL
GLUCOSE SERPL-MCNC: 82 MG/DL (ref 65–99)
HBA1C MFR BLD: 5.6 % (ref 4.8–5.6)
HCT VFR BLD AUTO: 43.5 % (ref 34–46.6)
HCV AB SERPL QL IA: NORMAL
HCV IGG SERPL QL IA: NON REACTIVE
HDLC SERPL-MCNC: 29 MG/DL (ref 40–60)
HGB BLD-MCNC: 14.3 G/DL (ref 12–15.9)
IMM GRANULOCYTES # BLD AUTO: 0.04 10*3/MM3 (ref 0–0.05)
IMM GRANULOCYTES NFR BLD AUTO: 0.3 % (ref 0–0.5)
LDLC SERPL CALC-MCNC: 104 MG/DL (ref 0–100)
LDLC/HDLC SERPL: 3.48 {RATIO}
LYMPHOCYTES # BLD AUTO: 4.28 10*3/MM3 (ref 0.7–3.1)
LYMPHOCYTES NFR BLD AUTO: 32.3 % (ref 19.6–45.3)
MCH RBC QN AUTO: 28.3 PG (ref 26.6–33)
MCHC RBC AUTO-ENTMCNC: 32.9 G/DL (ref 31.5–35.7)
MCV RBC AUTO: 86.1 FL (ref 79–97)
MONOCYTES # BLD AUTO: 0.84 10*3/MM3 (ref 0.1–0.9)
MONOCYTES NFR BLD AUTO: 6.3 % (ref 5–12)
NEUTROPHILS NFR BLD AUTO: 58.1 % (ref 42.7–76)
NEUTROPHILS NFR BLD AUTO: 7.68 10*3/MM3 (ref 1.7–7)
NRBC BLD AUTO-RTO: 0 /100 WBC (ref 0–0.2)
PLATELET # BLD AUTO: 306 10*3/MM3 (ref 140–450)
PMV BLD AUTO: 11.4 FL (ref 6–12)
POTASSIUM SERPL-SCNC: 4.3 MMOL/L (ref 3.5–5.2)
PROT SERPL-MCNC: 7.4 G/DL (ref 6–8.5)
RBC # BLD AUTO: 5.05 10*6/MM3 (ref 3.77–5.28)
SODIUM SERPL-SCNC: 139 MMOL/L (ref 136–145)
TRIGL SERPL-MCNC: 150 MG/DL (ref 0–150)
TSH SERPL DL<=0.05 MIU/L-ACNC: 3.27 UIU/ML (ref 0.27–4.2)
VLDLC SERPL-MCNC: 27 MG/DL (ref 5–40)
WBC NRBC COR # BLD: 13.24 10*3/MM3 (ref 3.4–10.8)

## 2023-03-07 ENCOUNTER — TELEPHONE (OUTPATIENT)
Dept: FAMILY MEDICINE CLINIC | Facility: CLINIC | Age: 33
End: 2023-03-07
Payer: MEDICAID

## 2023-03-07 NOTE — TELEPHONE ENCOUNTER
----- Message from DALILA Juan sent at 3/7/2023 12:11 PM EST -----  Please call patient regarding results.     Labs stable.  Cholesterol is abnormal.  Continue to work on diet and exercise.        Patient notified & verbalized understanding.  
No

## 2023-03-09 ENCOUNTER — OFFICE VISIT (OUTPATIENT)
Dept: FAMILY MEDICINE CLINIC | Facility: CLINIC | Age: 33
End: 2023-03-09
Payer: MEDICAID

## 2023-03-09 VITALS
BODY MASS INDEX: 40.96 KG/M2 | WEIGHT: 203.2 LBS | HEART RATE: 64 BPM | OXYGEN SATURATION: 100 % | DIASTOLIC BLOOD PRESSURE: 72 MMHG | SYSTOLIC BLOOD PRESSURE: 116 MMHG | RESPIRATION RATE: 16 BRPM | TEMPERATURE: 97.7 F | HEIGHT: 59 IN

## 2023-03-09 DIAGNOSIS — F17.200 SMOKER: ICD-10-CM

## 2023-03-09 DIAGNOSIS — J06.9 VIRAL URI: Primary | ICD-10-CM

## 2023-03-09 DIAGNOSIS — H66.002 NON-RECURRENT ACUTE SUPPURATIVE OTITIS MEDIA OF LEFT EAR WITHOUT SPONTANEOUS RUPTURE OF TYMPANIC MEMBRANE: ICD-10-CM

## 2023-03-09 LAB
EXPIRATION DATE: NORMAL
FLUAV AG UPPER RESP QL IA.RAPID: NOT DETECTED
FLUBV AG UPPER RESP QL IA.RAPID: NOT DETECTED
INTERNAL CONTROL: NORMAL
Lab: NORMAL
SARS-COV-2 AG UPPER RESP QL IA.RAPID: NOT DETECTED

## 2023-03-09 PROCEDURE — 87428 SARSCOV & INF VIR A&B AG IA: CPT | Performed by: NURSE PRACTITIONER

## 2023-03-09 PROCEDURE — 99213 OFFICE O/P EST LOW 20 MIN: CPT | Performed by: NURSE PRACTITIONER

## 2023-03-09 PROCEDURE — 1159F MED LIST DOCD IN RCRD: CPT | Performed by: NURSE PRACTITIONER

## 2023-03-09 PROCEDURE — 1160F RVW MEDS BY RX/DR IN RCRD: CPT | Performed by: NURSE PRACTITIONER

## 2023-03-09 RX ORDER — AZITHROMYCIN 250 MG/1
TABLET, FILM COATED ORAL
Qty: 6 TABLET | Refills: 0 | Status: SHIPPED | OUTPATIENT
Start: 2023-03-09 | End: 2023-03-16

## 2023-03-09 RX ORDER — BENZONATATE 100 MG/1
200 CAPSULE ORAL 3 TIMES DAILY PRN
Qty: 30 CAPSULE | Refills: 0 | Status: SHIPPED | OUTPATIENT
Start: 2023-03-09

## 2023-03-09 NOTE — PROGRESS NOTES
Chief Complaint  Nasal Congestion, Earache, and Cough    Subjective          Enriqueta Gotti is a 32 y.o. female who presents today to [facility] for initial evaluation     HPI:   History of Present Illness    Presents for complaint of congestion, cough, ear ache x 2 days. Denies any associated symptoms. Treatments: allergy medicine, sinus medicine.  No other issues or concerns at this time.      The following portions of the patient's history were reviewed and updated as appropriate: allergies, current medications, past family history, past medical history, past social history, past surgical history and problem list.    Objective     Allergy:   Allergies   Allergen Reactions   • Codeine    • Penicillins    • Sulfa Antibiotics         Current Medications:   Current Outpatient Medications   Medication Sig Dispense Refill   • cetirizine (zyrTEC) 10 MG tablet Take 1 tablet by mouth Daily. 90 tablet 1   • hydrOXYzine (ATARAX) 25 MG tablet Take 1-2 tablets nightly as needed for sleep . 90 tablet 0   • lamoTRIgine (LaMICtal) 150 MG tablet Take 1 tablet by mouth Daily for 30 days. 30 tablet 1   • methylphenidate (Ritalin) 10 MG tablet Take 1 tablet by mouth 2 (Two) Times a Day for 30 days. 60 tablet 0   • olopatadine (PATANOL) 0.1 % ophthalmic solution Administer 1 drop to both eyes 2 (Two) Times a Day As Needed for Allergies. 5 mL 0   • ProAir  (90 Base) MCG/ACT inhaler Inhale 2 puffs Every 6 (Six) Hours As Needed for Wheezing or Shortness of Air. 18 g 2   • vilazodone (Viibryd) 20 MG tablet tablet Take 1 tablet by mouth Daily for 90 days. 90 tablet 0   • azithromycin (Zithromax Z-Petros) 250 MG tablet Take 2 tablets by mouth on day 1, then 1 tablet daily on days 2-5 6 tablet 0   • benzonatate (Tessalon Perles) 100 MG capsule Take 2 capsules by mouth 3 (Three) Times a Day As Needed for Cough. 30 capsule 0     No current facility-administered medications for this visit.  "      Past Medical History:  Past Medical History:   Diagnosis Date   • ADHD (attention deficit hyperactivity disorder)    • Allergic    • Ankle sprain     right   • Anxiety    • Bipolar disorder (HCC)    • Carpal tunnel syndrome    • Chronic pain disorder     Fibroymyalgia   • Depression    • Suicide attempt (HCC)    • Tear of meniscus of knee     left        Past Surgical History:  Past Surgical History:   Procedure Laterality Date   • CARPAL TUNNEL RELEASE     •  SECTION     • CHOLECYSTECTOMY     • LAPAROSCOPIC TUBAL LIGATION     • MOUTH SURGERY     • TONSILLECTOMY AND ADENOIDECTOMY     • TUBAL ABDOMINAL LIGATION         Social History:  Social History     Socioeconomic History   • Marital status:    Tobacco Use   • Smoking status: Every Day     Packs/day: 1.00     Years: 3.00     Pack years: 3.00     Types: Cigarettes     Start date: 2016   • Smokeless tobacco: Never   • Tobacco comments:     Trying to quit.   Vaping Use   • Vaping Use: Never used   Substance and Sexual Activity   • Alcohol use: Yes     Comment: rarely   • Drug use: No   • Sexual activity: Defer       Family History:  Family History   Problem Relation Age of Onset   • Anxiety disorder Mother    • Bipolar disorder Mother    • Schizophrenia Brother    • Osteoarthritis Other    • Osteoporosis Other    • Heart disease Other    • Hypertension Other    • Asthma Other    • Diabetes Other    • Kidney disease Other    • Stroke Other    • ADD / ADHD Child        Review of Systems:  Review of Systems   Constitutional: Negative for fever.   Respiratory: Positive for wheezing. Negative for shortness of breath.        Vital Signs:   /72 (BP Location: Right arm, Patient Position: Sitting, Cuff Size: Large Adult)   Pulse 64   Temp 97.7 °F (36.5 °C) (Temporal)   Resp 16   Ht 149.9 cm (59\")   Wt 92.2 kg (203 lb 3.2 oz)   SpO2 100%   BMI 41.04 kg/m²      Physical Exam:  Physical Exam  Vitals and nursing note reviewed. "   Constitutional:       General: She is not in acute distress.     Appearance: Normal appearance. She is not ill-appearing or toxic-appearing.   HENT:      Head: Normocephalic.      Right Ear: Ear canal and external ear normal. A middle ear effusion is present.      Left Ear: Ear canal and external ear normal. Tenderness present. Tympanic membrane is erythematous and bulging.      Nose: Congestion and rhinorrhea present.      Mouth/Throat:      Lips: Pink.      Mouth: Mucous membranes are moist.      Pharynx: Oropharynx is clear.   Eyes:      Conjunctiva/sclera: Conjunctivae normal.   Cardiovascular:      Rate and Rhythm: Normal rate and regular rhythm.      Heart sounds: Normal heart sounds.   Pulmonary:      Effort: Pulmonary effort is normal. No respiratory distress.      Breath sounds: Normal breath sounds.   Abdominal:      General: Bowel sounds are normal. There is no distension.      Palpations: Abdomen is soft.      Tenderness: There is no abdominal tenderness.   Lymphadenopathy:      Cervical: No cervical adenopathy.   Skin:     General: Skin is warm and dry.      Capillary Refill: Capillary refill takes less than 2 seconds.      Coloration: Skin is not pale.   Neurological:      Mental Status: She is alert and oriented to person, place, and time.   Psychiatric:         Mood and Affect: Mood normal.         Behavior: Behavior normal.         Thought Content: Thought content normal.         Judgment: Judgment normal.                  Assessment and Plan   Diagnoses and all orders for this visit:    1. Viral URI (Primary)  -     POCT SARS-CoV-2 Antigen ALINA + Flu  -     benzonatate (Tessalon Perles) 100 MG capsule; Take 2 capsules by mouth 3 (Three) Times a Day As Needed for Cough.  Dispense: 30 capsule; Refill: 0    2. Non-recurrent acute suppurative otitis media of left ear without spontaneous rupture of tympanic membrane  -     azithromycin (Zithromax Z-Petros) 250 MG tablet; Take 2 tablets by mouth on day 1,  then 1 tablet daily on days 2-5  Dispense: 6 tablet; Refill: 0    3. Smoker    Cough and deep breathe.  Increase humidification and hydration.  Symptom management as discussed.  Initiate antibiotic regimen as above.  Stop smoking, declines at this time     Discussed possible differential diagnoses, testing, treatment, recommended non-pharmacological interventions, risks, warning signs to monitor for that would indicate need for follow-up in clinic or ER. If no improvement with these regimens or you have new or worsening symptoms follow-up. Patient verbalizes understanding and agreement with plan of care. Denies further needs or concerns.     Patient was given instructions and counseling regarding her condition and for health maintenance advised.    Class 3 Severe Obesity (BMI >=40). Obesity-related health conditions include the following: obstructive sleep apnea, hypertension, coronary heart disease, diabetes mellitus, dyslipidemias, GERD and peripheral vascular disease. Obesity is worsening. BMI is is above average; BMI management plan is completed. We discussed portion control and increasing exercise.       I spent 20 minutes caring for patient on this date of service. This time includes time spent by me in the following activities: preparing for the visit, reviewing tests, obtaining and/or reviewing a separately obtained history, performing a medically appropriate examination and/or evaluation, counseling and educating the patient/family/caregiver, ordering medications, tests, or procedures and documenting information in the medical record    Meds ordered during this visit:  New Medications Ordered This Visit   Medications   • benzonatate (Tessalon Perles) 100 MG capsule     Sig: Take 2 capsules by mouth 3 (Three) Times a Day As Needed for Cough.     Dispense:  30 capsule     Refill:  0   • azithromycin (Zithromax Z-Petros) 250 MG tablet     Sig: Take 2 tablets by mouth on day 1, then 1 tablet daily on days 2-5      Dispense:  6 tablet     Refill:  0       Patient Instructions:  Patient instructions given for the following visit diagnosis:    ICD-10-CM ICD-9-CM   1. Viral URI  J06.9 465.9   2. Non-recurrent acute suppurative otitis media of left ear without spontaneous rupture of tympanic membrane  H66.002 382.00   3. Smoker  F17.200 305.1       Follow Up   Return in about 1 week (around 3/16/2023) for Recheck, Sooner if needed.        This document has been electronically signed by DALILA Juan  March 9, 2023 11:30 EST    Patient was given instructions and counseling regarding her condition or for health maintenance advice. Please see specific information pulled into the AVS if appropriate.     Part of this note may be an electronic transcription/translation of spoken language to printed text using the Dragon Dictation System.

## 2023-03-16 ENCOUNTER — OFFICE VISIT (OUTPATIENT)
Dept: FAMILY MEDICINE CLINIC | Facility: CLINIC | Age: 33
End: 2023-03-16
Payer: MEDICAID

## 2023-03-16 VITALS
OXYGEN SATURATION: 97 % | BODY MASS INDEX: 41.2 KG/M2 | TEMPERATURE: 98.2 F | WEIGHT: 204.4 LBS | HEIGHT: 59 IN | DIASTOLIC BLOOD PRESSURE: 78 MMHG | HEART RATE: 88 BPM | SYSTOLIC BLOOD PRESSURE: 104 MMHG

## 2023-03-16 DIAGNOSIS — G43.009 MIGRAINE WITHOUT AURA AND WITHOUT STATUS MIGRAINOSUS, NOT INTRACTABLE: Primary | ICD-10-CM

## 2023-03-16 PROCEDURE — 1160F RVW MEDS BY RX/DR IN RCRD: CPT | Performed by: FAMILY MEDICINE

## 2023-03-16 PROCEDURE — 1159F MED LIST DOCD IN RCRD: CPT | Performed by: FAMILY MEDICINE

## 2023-03-16 PROCEDURE — 99213 OFFICE O/P EST LOW 20 MIN: CPT | Performed by: FAMILY MEDICINE

## 2023-03-16 RX ORDER — KETOROLAC TROMETHAMINE 30 MG/ML
60 INJECTION, SOLUTION INTRAMUSCULAR; INTRAVENOUS ONCE
Status: DISCONTINUED | OUTPATIENT
Start: 2023-03-16 | End: 2023-03-16

## 2023-03-16 RX ORDER — ONDANSETRON 4 MG/1
4 TABLET, ORALLY DISINTEGRATING ORAL EVERY 8 HOURS PRN
Qty: 20 TABLET | Refills: 0 | Status: SHIPPED | OUTPATIENT
Start: 2023-03-16

## 2023-03-16 RX ORDER — RIMEGEPANT SULFATE 75 MG/75MG
75 TABLET, ORALLY DISINTEGRATING ORAL ONCE
Qty: 2 TABLET | Refills: 0 | COMMUNITY
Start: 2023-03-16 | End: 2023-03-16

## 2023-03-16 NOTE — PROGRESS NOTES
"Chief Complaint  Headache (Pt reports she has had migraine for two days now ) and Earache    Subjective          Enriqueta Gotti presents to [facility]  History of Present Illness  Patient complains of migraine.  States she has a history of this.  States she has been having the pain since yesterday states she has previously gone to the hospital before to get relief from her headaches.  Patient is not agreeable to Toradol shot today.  Patient would like to try Nurtec and samples given.      Review of Systems      Objective   Vital Signs:   /78 (BP Location: Right arm, Patient Position: Sitting)   Pulse 88   Temp 98.2 °F (36.8 °C) (Temporal)   Ht 149.9 cm (59\")   Wt 92.7 kg (204 lb 6.4 oz)   SpO2 97%   BMI 41.28 kg/m²     Physical Exam  Constitutional:       General: She is not in acute distress.     Appearance: Normal appearance. She is well-developed and well-groomed. She is not ill-appearing, toxic-appearing or diaphoretic.   HENT:      Head: Normocephalic.      Right Ear: Tympanic membrane, ear canal and external ear normal.      Left Ear: Tympanic membrane, ear canal and external ear normal.      Nose: Nose normal. No congestion or rhinorrhea.      Mouth/Throat:      Mouth: Mucous membranes are moist.      Pharynx: Oropharynx is clear. No oropharyngeal exudate or posterior oropharyngeal erythema.   Eyes:      General: Lids are normal.         Right eye: No discharge.         Left eye: No discharge.      Extraocular Movements: Extraocular movements intact.      Pupils: Pupils are equal, round, and reactive to light.   Neck:      Vascular: No carotid bruit.   Cardiovascular:      Rate and Rhythm: Normal rate and regular rhythm.      Pulses: Normal pulses.      Heart sounds: Normal heart sounds. No murmur heard.    No friction rub. No gallop.   Pulmonary:      Effort: Pulmonary effort is normal. No respiratory distress.      Breath sounds: Normal breath sounds. No " stridor. No wheezing, rhonchi or rales.   Chest:      Chest wall: No tenderness.   Abdominal:      General: Bowel sounds are normal. There is no distension.      Palpations: Abdomen is soft. There is no mass.      Tenderness: There is no abdominal tenderness. There is no right CVA tenderness, left CVA tenderness, guarding or rebound.      Hernia: No hernia is present.   Musculoskeletal:         General: No swelling or tenderness. Normal range of motion.      Cervical back: Normal range of motion and neck supple. No rigidity or tenderness.      Right lower leg: No edema.      Left lower leg: No edema.   Lymphadenopathy:      Cervical: No cervical adenopathy.   Skin:     General: Skin is warm.      Capillary Refill: Capillary refill takes less than 2 seconds.      Coloration: Skin is not jaundiced.      Findings: No bruising, erythema or rash.   Neurological:      General: No focal deficit present.      Mental Status: She is alert and oriented to person, place, and time.      Motor: Motor function is intact. No weakness.      Coordination: Coordination is intact.      Gait: Gait is intact. Gait normal.   Psychiatric:         Attention and Perception: Attention normal.         Mood and Affect: Mood normal.         Speech: Speech normal.         Behavior: Behavior normal.         Cognition and Memory: Cognition normal.         Judgment: Judgment normal.        Result Review :                 Assessment and Plan    Diagnoses and all orders for this visit:    1. Migraine without aura and without status migrainosus, not intractable (Primary)  -     Discontinue: ketorolac (TORADOL) injection 60 mg  -     ondansetron ODT (ZOFRAN-ODT) 4 MG disintegrating tablet; Place 1 tablet on the tongue Every 8 (Eight) Hours As Needed for Nausea or Vomiting.  Dispense: 20 tablet; Refill: 0  -     Rimegepant Sulfate (Nurtec) 75 MG tablet dispersible tablet; Take 1 tablet by mouth 1 (One) Time for 1 dose.  Dispense: 2 tablet; Refill:  0      Patient's Body mass index is 41.28 kg/m². indicating that she is morbidly obese (BMI > 40 or > 35 with obesity - related health condition). Obesity-related health conditions include the following: none. Obesity is unchanged. BMI is is above average; BMI management plan is completed. We discussed low calorie, low carb based diet program, portion control and increasing exercise..    Follow Up   No follow-ups on file.  Patient was given instructions and counseling regarding her condition or for health maintenance advice. Please see specific information pulled into the AVS if appropriate.     This document has been electronically signed by DALILA Torres  March 16, 2023 13:10 EDT

## 2023-04-10 ENCOUNTER — OFFICE VISIT (OUTPATIENT)
Dept: FAMILY MEDICINE CLINIC | Facility: CLINIC | Age: 33
End: 2023-04-10
Payer: MEDICAID

## 2023-04-10 VITALS
RESPIRATION RATE: 18 BRPM | DIASTOLIC BLOOD PRESSURE: 74 MMHG | BODY MASS INDEX: 41.16 KG/M2 | TEMPERATURE: 98.6 F | SYSTOLIC BLOOD PRESSURE: 122 MMHG | WEIGHT: 204.2 LBS | HEART RATE: 92 BPM | OXYGEN SATURATION: 100 % | HEIGHT: 59 IN

## 2023-04-10 DIAGNOSIS — R11.0 NAUSEA: Primary | ICD-10-CM

## 2023-04-10 DIAGNOSIS — G43.009 MIGRAINE WITHOUT AURA AND WITHOUT STATUS MIGRAINOSUS, NOT INTRACTABLE: ICD-10-CM

## 2023-04-10 DIAGNOSIS — R21 RASH: ICD-10-CM

## 2023-04-10 PROCEDURE — 99214 OFFICE O/P EST MOD 30 MIN: CPT | Performed by: FAMILY MEDICINE

## 2023-04-10 RX ORDER — ONDANSETRON 4 MG/1
4 TABLET, ORALLY DISINTEGRATING ORAL EVERY 8 HOURS PRN
Qty: 20 TABLET | Refills: 0 | Status: SHIPPED | OUTPATIENT
Start: 2023-04-10

## 2023-04-11 ENCOUNTER — TELEPHONE (OUTPATIENT)
Dept: FAMILY MEDICINE CLINIC | Facility: CLINIC | Age: 33
End: 2023-04-11
Payer: MEDICAID

## 2023-04-11 NOTE — TELEPHONE ENCOUNTER
Caller: Enriqueta Gotti    Relationship: Self    Best call back number: [phone]    Requested Prescriptions:   STEROID MEDICATION     Pharmacy where request should be sent: [facility] [address] [place] [address] [phone] [facility] [phone] FX     Last office visit with prescribing clinician: 3/9/2023   Last telemedicine visit with prescribing clinician: 6/1/2023   Next office visit with prescribing clinician: 6/1/2023     Additional details provided by patient: PLEASE CALL IN THE STEROID MEDICATION AS DISCUSSED AT LAST VISIT 4/10/23.  NOT GETTING BETTER IN THE CURRENT MEDICATION. STILL ITCHY AND SWOLLEN AND HURTS    Does the patient have less than a 3 day supply:  [x] Yes  [] No    Would you like a call back once the refill request has been completed: [] Yes [] No    If the office needs to give you a call back, can they leave a voicemail: [] Yes [] No    Sharon Zapata Rep   04/11/23 15:45 EDT

## 2023-04-12 RX ORDER — METHYLPREDNISOLONE 4 MG/1
TABLET ORAL
Qty: 21 TABLET | Refills: 0 | Status: SHIPPED | OUTPATIENT
Start: 2023-04-12

## 2023-04-24 ENCOUNTER — HOSPITAL ENCOUNTER (EMERGENCY)
Facility: HOSPITAL | Age: 33
Discharge: HOME | End: 2023-04-24
Attending: STUDENT IN AN ORGANIZED HEALTH CARE EDUCATION/TRAINING PROGRAM | Admitting: STUDENT IN AN ORGANIZED HEALTH CARE EDUCATION/TRAINING PROGRAM
Payer: MEDICAID

## 2023-04-24 ENCOUNTER — APPOINTMENT (OUTPATIENT)
Dept: CT IMAGING | Facility: HOSPITAL | Age: 33
End: 2023-04-24
Payer: MEDICAID

## 2023-04-24 VITALS
WEIGHT: 204 LBS | HEIGHT: 59 IN | RESPIRATION RATE: 18 BRPM | SYSTOLIC BLOOD PRESSURE: 115 MMHG | HEART RATE: 80 BPM | OXYGEN SATURATION: 99 % | DIASTOLIC BLOOD PRESSURE: 74 MMHG | TEMPERATURE: 97.1 F | BODY MASS INDEX: 41.12 KG/M2

## 2023-04-24 DIAGNOSIS — R10.84 GENERALIZED ABDOMINAL PAIN: Primary | ICD-10-CM

## 2023-04-24 LAB
ALBUMIN SERPL-MCNC: 4.1 G/DL (ref 3.5–5.2)
ALBUMIN/GLOB SERPL: 1.2 G/DL
ALP SERPL-CCNC: 79 U/L (ref 39–117)
ALT SERPL W P-5'-P-CCNC: 22 U/L (ref 1–33)
ANION GAP SERPL CALCULATED.3IONS-SCNC: 8.7 MMOL/L (ref 5–15)
AST SERPL-CCNC: 20 U/L (ref 1–32)
B-HCG UR QL: NEGATIVE
BASOPHILS # BLD AUTO: 0.08 10*3/MM3 (ref 0–0.2)
BASOPHILS NFR BLD AUTO: 0.6 % (ref 0–1.5)
BILIRUB SERPL-MCNC: 0.3 MG/DL (ref 0–1.2)
BILIRUB UR QL STRIP: NEGATIVE
BUN SERPL-MCNC: 8 MG/DL (ref 6–20)
BUN/CREAT SERPL: 11.1 (ref 7–25)
CALCIUM SPEC-SCNC: 9.8 MG/DL (ref 8.6–10.5)
CHLORIDE SERPL-SCNC: 107 MMOL/L (ref 98–107)
CLARITY UR: CLEAR
CO2 SERPL-SCNC: 25.3 MMOL/L (ref 22–29)
COLOR UR: YELLOW
CREAT SERPL-MCNC: 0.72 MG/DL (ref 0.57–1)
DEPRECATED RDW RBC AUTO: 42.5 FL (ref 37–54)
EGFRCR SERPLBLD CKD-EPI 2021: 112.7 ML/MIN/1.73
EOSINOPHIL # BLD AUTO: 0.26 10*3/MM3 (ref 0–0.4)
EOSINOPHIL NFR BLD AUTO: 1.9 % (ref 0.3–6.2)
ERYTHROCYTE [DISTWIDTH] IN BLOOD BY AUTOMATED COUNT: 13.1 % (ref 12.3–15.4)
GLOBULIN UR ELPH-MCNC: 3.5 GM/DL
GLUCOSE SERPL-MCNC: 99 MG/DL (ref 65–99)
GLUCOSE UR STRIP-MCNC: NEGATIVE MG/DL
HCT VFR BLD AUTO: 44.8 % (ref 34–46.6)
HGB BLD-MCNC: 14.4 G/DL (ref 12–15.9)
HGB UR QL STRIP.AUTO: NEGATIVE
HOLD SPECIMEN: NORMAL
HOLD SPECIMEN: NORMAL
IMM GRANULOCYTES # BLD AUTO: 0.06 10*3/MM3 (ref 0–0.05)
IMM GRANULOCYTES NFR BLD AUTO: 0.4 % (ref 0–0.5)
KETONES UR QL STRIP: NEGATIVE
LEUKOCYTE ESTERASE UR QL STRIP.AUTO: NEGATIVE
LIPASE SERPL-CCNC: 33 U/L (ref 13–60)
LYMPHOCYTES # BLD AUTO: 4.73 10*3/MM3 (ref 0.7–3.1)
LYMPHOCYTES NFR BLD AUTO: 34.9 % (ref 19.6–45.3)
MCH RBC QN AUTO: 28.5 PG (ref 26.6–33)
MCHC RBC AUTO-ENTMCNC: 32.1 G/DL (ref 31.5–35.7)
MCV RBC AUTO: 88.5 FL (ref 79–97)
MONOCYTES # BLD AUTO: 0.93 10*3/MM3 (ref 0.1–0.9)
MONOCYTES NFR BLD AUTO: 6.9 % (ref 5–12)
NEUTROPHILS NFR BLD AUTO: 55.3 % (ref 42.7–76)
NEUTROPHILS NFR BLD AUTO: 7.51 10*3/MM3 (ref 1.7–7)
NITRITE UR QL STRIP: NEGATIVE
NRBC BLD AUTO-RTO: 0 /100 WBC (ref 0–0.2)
PH UR STRIP.AUTO: 6 [PH] (ref 5–8)
PLATELET # BLD AUTO: 264 10*3/MM3 (ref 140–450)
PMV BLD AUTO: 10.4 FL (ref 6–12)
POTASSIUM SERPL-SCNC: 4 MMOL/L (ref 3.5–5.2)
PROT SERPL-MCNC: 7.6 G/DL (ref 6–8.5)
PROT UR QL STRIP: NEGATIVE
RBC # BLD AUTO: 5.06 10*6/MM3 (ref 3.77–5.28)
SODIUM SERPL-SCNC: 141 MMOL/L (ref 136–145)
SP GR UR STRIP: 1.01 (ref 1–1.03)
UROBILINOGEN UR QL STRIP: NORMAL
WBC NRBC COR # BLD: 13.57 10*3/MM3 (ref 3.4–10.8)
WHOLE BLOOD HOLD COAG: NORMAL
WHOLE BLOOD HOLD SPECIMEN: NORMAL

## 2023-04-24 PROCEDURE — 83690 ASSAY OF LIPASE: CPT | Performed by: NURSE PRACTITIONER

## 2023-04-24 PROCEDURE — 74176 CT ABD & PELVIS W/O CONTRAST: CPT

## 2023-04-24 PROCEDURE — 81025 URINE PREGNANCY TEST: CPT | Performed by: NURSE PRACTITIONER

## 2023-04-24 PROCEDURE — 99283 EMERGENCY DEPT VISIT LOW MDM: CPT

## 2023-04-24 PROCEDURE — 36415 COLL VENOUS BLD VENIPUNCTURE: CPT

## 2023-04-24 PROCEDURE — 85025 COMPLETE CBC W/AUTO DIFF WBC: CPT | Performed by: NURSE PRACTITIONER

## 2023-04-24 PROCEDURE — 25010000002 KETOROLAC TROMETHAMINE PER 15 MG: Performed by: STUDENT IN AN ORGANIZED HEALTH CARE EDUCATION/TRAINING PROGRAM

## 2023-04-24 PROCEDURE — 96372 THER/PROPH/DIAG INJ SC/IM: CPT

## 2023-04-24 PROCEDURE — 81003 URINALYSIS AUTO W/O SCOPE: CPT | Performed by: NURSE PRACTITIONER

## 2023-04-24 PROCEDURE — 80053 COMPREHEN METABOLIC PANEL: CPT | Performed by: NURSE PRACTITIONER

## 2023-04-24 PROCEDURE — 74176 CT ABD & PELVIS W/O CONTRAST: CPT | Performed by: RADIOLOGY

## 2023-04-24 RX ORDER — SODIUM CHLORIDE 0.9 % (FLUSH) 0.9 %
10 SYRINGE (ML) INJECTION AS NEEDED
Status: DISCONTINUED | OUTPATIENT
Start: 2023-04-24 | End: 2023-04-25 | Stop reason: HOSPADM

## 2023-04-24 RX ORDER — KETOROLAC TROMETHAMINE 30 MG/ML
30 INJECTION, SOLUTION INTRAMUSCULAR; INTRAVENOUS ONCE
Status: COMPLETED | OUTPATIENT
Start: 2023-04-24 | End: 2023-04-24

## 2023-04-24 RX ADMIN — KETOROLAC TROMETHAMINE 30 MG: 30 INJECTION, SOLUTION INTRAMUSCULAR; INTRAVENOUS at 23:51

## 2023-04-24 NOTE — ED NOTES
MEDICAL SCREENING:    Reason for Visit: pt is c/o lower abdominal pain and nausea that started last night.     Patient initially seen in triage. The patient was advised further evaluation and diagnostic testing will be needed, some of the treatment and testing will be initiated in the lobby in order to begin the process. The patient will be returned to the waiting area for the time being and possibly be re-assessed by a subsequent ED provider. The patient will be brought back to the treatment area in as timely manner as possible.       Chandrika Chery, APRN  04/24/23 [id number]

## 2023-04-25 ENCOUNTER — PATIENT OUTREACH (OUTPATIENT)
Dept: CASE MANAGEMENT | Facility: OTHER | Age: 33
End: 2023-04-25
Payer: MEDICAID

## 2023-04-25 NOTE — OUTREACH NOTE
AMBULATORY CASE MANAGEMENT NOTE    Name and Relationship of Patient/Support Person: Enriqueta Gotti - Self    Patient Outreach    [facility] ASSIGNMENT: 04/25/23  PURPOSE: RN-ACM follow-up for ED visit 04/24/23   INITIAL CHART REVIEW: 04/25/23  NINOSKAT: Active    Patient presented at [facility] on 04/24/23 with complaint of abdominal pain and nausea.  Patient was treated and discharged to home to follow as outpatient. Clinical impression noted as generalized abdominal pain  Per review of clinical notes patient was treated and discharged to home.  After Visit Summary education topics include Abdominal Pain, Adult.     Medication changes noted at discharge include: none    Recommended follow up: Follow up with Laurie Mathis APRN in 1 week; Follow up with [facility] if symptoms worsen.    -----------    04/25/23:  RN-ACM outreach with patient.  AVS, education, and recommended f/u reviewed.  Patient reported little improvement in symptoms and is in agreement with  a f/u appointment being scheduled with her primary care provider.  RN-ACM facilitated scheduling.  Patient can be seen tomorrow, 04/26/23 at 10:00 am.  Patient voiced acceptance of the appointment as scheduled and denied other needs/questions/concerns for RN-ACM to address.      [place] Submissions  - [facility] Questionnaire  - Advance Directive General Education        Di BALL  Ambulatory Case Management    4/25/2023, 16:45 EDT

## 2023-04-25 NOTE — ED PROVIDER NOTES
Subjective   History of Present Illness  32-year-old female presents to the ER with primary complaint of right lower quadrant abdominal pain and suprapubic abdominal pain.  Symptoms been present for the last 2 to 3 days with intermittent nausea.  No vomiting.  No diarrhea.  No fever or chills.  No obvious aggravating or alleviating factors.  Vital stable.  Afebrile        Review of Systems   Gastrointestinal: Positive for abdominal pain and nausea.   All other systems reviewed and are negative.      Past Medical History:   Diagnosis Date   • ADHD (attention deficit hyperactivity disorder)    • Allergic    • Ankle sprain     right   • Anxiety    • Bipolar disorder    • Carpal tunnel syndrome    • Chronic pain disorder     Fibroymyalgia   • Depression    • Suicide attempt    • Tear of meniscus of knee     left        Allergies   Allergen Reactions   • Codeine    • Penicillins    • Sulfa Antibiotics        Past Surgical History:   Procedure Laterality Date   • CARPAL TUNNEL RELEASE     •  SECTION     • CHOLECYSTECTOMY     • LAPAROSCOPIC TUBAL LIGATION     • MOUTH SURGERY     • TONSILLECTOMY AND ADENOIDECTOMY     • TUBAL ABDOMINAL LIGATION         Family History   Problem Relation Age of Onset   • Anxiety disorder Mother    • Bipolar disorder Mother    • Schizophrenia Brother    • Osteoarthritis Other    • Osteoporosis Other    • Heart disease Other    • Hypertension Other    • Asthma Other    • Diabetes Other    • Kidney disease Other    • Stroke Other    • ADD / ADHD Child        Social History     Socioeconomic History   • Marital status:    Tobacco Use   • Smoking status: Every Day     Packs/day: 0.50     Years: 3.00     Pack years: 1.50     Types: Cigarettes     Start date: 2016   • Smokeless tobacco: Never   • Tobacco comments:     Trying to quit.   Vaping Use   • Vaping Use: Never used   Substance and Sexual Activity   • Alcohol use: Yes     Comment: rarely   • Drug use: No   • Sexual  activity: Defer           Objective   Physical Exam  Constitutional:       General: She is not in acute distress.     Appearance: Normal appearance. She is not ill-appearing.   HENT:      Head: Normocephalic and atraumatic.      Right Ear: External ear normal.      Left Ear: External ear normal.      Nose: Nose normal.      Mouth/Throat:      Mouth: Mucous membranes are moist.   Eyes:      Extraocular Movements: Extraocular movements intact.      Pupils: Pupils are equal, round, and reactive to light.   Cardiovascular:      Rate and Rhythm: Normal rate and regular rhythm.      Heart sounds: No murmur heard.  Pulmonary:      Effort: Pulmonary effort is normal. No respiratory distress.      Breath sounds: Normal breath sounds. No wheezing.   Abdominal:      General: Bowel sounds are normal.      Palpations: Abdomen is soft.      Tenderness: There is abdominal tenderness in the right lower quadrant and suprapubic area.   Musculoskeletal:         General: No deformity or signs of injury. Normal range of motion.      Cervical back: Normal range of motion and neck supple.   Skin:     General: Skin is warm and dry.      Findings: No erythema.   Neurological:      General: No focal deficit present.      Mental Status: She is alert and oriented to person, place, and time. Mental status is at baseline.      Cranial Nerves: No cranial nerve deficit.   Psychiatric:         Mood and Affect: Mood normal.         Behavior: Behavior normal.         Thought Content: Thought content normal.         Procedures           ED Course      CT Abdomen Pelvis Without Contrast    Result Date: 4/24/2023  No acute abnormality of the abdomen or pelvis, noting the noncontrast technique.  This report was finalized on 4/24/2023 10:36 PM by Vy Reese MD.        Results for orders placed or performed during the hospital encounter of 04/24/23   Comprehensive Metabolic Panel    Specimen: Arm, Right; Blood   Result Value Ref Range    Glucose 99  65 - 99 mg/dL    BUN 8 6 - 20 mg/dL    Creatinine 0.72 0.57 - 1.00 mg/dL    Sodium 141 136 - 145 mmol/L    Potassium 4.0 3.5 - 5.2 mmol/L    Chloride 107 98 - 107 mmol/L    CO2 25.3 22.0 - 29.0 mmol/L    Calcium 9.8 8.6 - 10.5 mg/dL    Total Protein 7.6 6.0 - 8.5 g/dL    Albumin 4.1 3.5 - 5.2 g/dL    ALT (SGPT) 22 1 - 33 U/L    AST (SGOT) 20 1 - 32 U/L    Alkaline Phosphatase 79 39 - 117 U/L    Total Bilirubin 0.3 0.0 - 1.2 mg/dL    Globulin 3.5 gm/dL    A/G Ratio 1.2 g/dL    BUN/Creatinine Ratio 11.1 7.0 - 25.0    Anion Gap 8.7 5.0 - 15.0 mmol/L    eGFR 112.7 >60.0 mL/min/1.73   Lipase    Specimen: Arm, Right; Blood   Result Value Ref Range    Lipase 33 13 - 60 U/L   Urinalysis With Microscopic If Indicated (No Culture) - Urine, Clean Catch    Specimen: Urine, Clean Catch   Result Value Ref Range    Color, UA Yellow Yellow, Straw    Appearance, UA Clear Clear    pH, UA 6.0 5.0 - 8.0    Specific Gravity, UA 1.012 1.005 - 1.030    Glucose, UA Negative Negative    Ketones, UA Negative Negative    Bilirubin, UA Negative Negative    Blood, UA Negative Negative    Protein, UA Negative Negative    Leuk Esterase, UA Negative Negative    Nitrite, UA Negative Negative    Urobilinogen, UA 0.2 E.U./dL 0.2 - 1.0 E.U./dL   Pregnancy, Urine - Urine, Clean Catch    Specimen: Urine, Clean Catch   Result Value Ref Range    HCG, Urine QL Negative Negative   CBC Auto Differential    Specimen: Arm, Right; Blood   Result Value Ref Range    WBC 13.57 (H) 3.40 - 10.80 10*3/mm3    RBC 5.06 3.77 - 5.28 10*6/mm3    Hemoglobin 14.4 12.0 - 15.9 g/dL    Hematocrit 44.8 34.0 - 46.6 %    MCV 88.5 79.0 - 97.0 fL    MCH 28.5 26.6 - 33.0 pg    MCHC 32.1 31.5 - 35.7 g/dL    RDW 13.1 12.3 - 15.4 %    RDW-SD 42.5 37.0 - 54.0 fl    MPV 10.4 6.0 - 12.0 fL    Platelets 264 140 - 450 10*3/mm3    Neutrophil % 55.3 42.7 - 76.0 %    Lymphocyte % 34.9 19.6 - 45.3 %    Monocyte % 6.9 5.0 - 12.0 %    Eosinophil % 1.9 0.3 - 6.2 %    Basophil % 0.6 0.0 - 1.5 %     Immature Grans % 0.4 0.0 - 0.5 %    Neutrophils, Absolute 7.51 (H) 1.70 - 7.00 10*3/mm3    Lymphocytes, Absolute 4.73 (H) 0.70 - 3.10 10*3/mm3    Monocytes, Absolute 0.93 (H) 0.10 - 0.90 10*3/mm3    Eosinophils, Absolute 0.26 0.00 - 0.40 10*3/mm3    Basophils, Absolute 0.08 0.00 - 0.20 10*3/mm3    Immature Grans, Absolute 0.06 (H) 0.00 - 0.05 10*3/mm3    nRBC 0.0 0.0 - 0.2 /100 WBC   Green Top (Gel)   Result Value Ref Range    Extra Tube Hold for add-ons.    Lavender Top   Result Value Ref Range    Extra Tube hold for add-on    Gold Top - SST   Result Value Ref Range    Extra Tube Hold for add-ons.    Light Blue Top   Result Value Ref Range    Extra Tube Hold for add-ons.                                           Medical Decision Making  CBC and CMP unremarkable.  CT of the abdomen/pelvis also noted no acute abnormality.  Urinalysis unremarkable.  Cannot rule out musculoskeletal versus viral source.  Work up and results were discussed throughly with the patient.  The patient will be discharged for further monitoring and management with their PCP.  Red flags, warning signs, worsening symptoms, and when to return to the ER discussed with and understood by the patient.  Patient will follow up with their PCP in a timely manner.  Vitals stable at discharge.    Generalized abdominal pain: complicated acute illness or injury  Amount and/or Complexity of Data Reviewed  Labs:  Decision-making details documented in ED Course.  Radiology: ordered. Decision-making details documented in ED Course.      Risk  Prescription drug management.          Final diagnoses:   Generalized abdominal pain       ED Disposition  ED Disposition     ED Disposition   Discharge    Condition   Stable    Comment   --             Laurie Mathis, APRN  [address] [facility] [address]  [phone]    In 1 week       Emergency Department  [address] [place] [id number] [phone]    If symptoms worsen          Medication List      No changes were made to your prescriptions during this visit.          Van Silverman,   04/24/23 [id number]

## 2023-04-25 NOTE — ED NOTES
Patient reassessed at this time. Patient denies any worsening pain/symptoms. No s/s of acute distress noted. Patient encouraged to remain seating in ED lobby and to alert staff should the wish to leave or should symptoms change or worsen. Patient agreeable.

## 2023-04-26 ENCOUNTER — OFFICE VISIT (OUTPATIENT)
Dept: FAMILY MEDICINE CLINIC | Facility: CLINIC | Age: 33
End: 2023-04-26
Payer: MEDICAID

## 2023-04-26 VITALS
BODY MASS INDEX: 40.76 KG/M2 | DIASTOLIC BLOOD PRESSURE: 64 MMHG | SYSTOLIC BLOOD PRESSURE: 114 MMHG | RESPIRATION RATE: 16 BRPM | HEIGHT: 59 IN | TEMPERATURE: 97.3 F | OXYGEN SATURATION: 96 % | WEIGHT: 202.2 LBS | HEART RATE: 85 BPM

## 2023-04-26 DIAGNOSIS — R11.0 NAUSEA: ICD-10-CM

## 2023-04-26 DIAGNOSIS — H10.13 ALLERGIC CONJUNCTIVITIS OF BOTH EYES: ICD-10-CM

## 2023-04-26 DIAGNOSIS — R19.7 DIARRHEA, UNSPECIFIED TYPE: ICD-10-CM

## 2023-04-26 DIAGNOSIS — R10.32 LEFT LOWER QUADRANT ABDOMINAL PAIN: Primary | ICD-10-CM

## 2023-04-26 PROCEDURE — 1160F RVW MEDS BY RX/DR IN RCRD: CPT | Performed by: NURSE PRACTITIONER

## 2023-04-26 PROCEDURE — 99214 OFFICE O/P EST MOD 30 MIN: CPT | Performed by: NURSE PRACTITIONER

## 2023-04-26 PROCEDURE — 1159F MED LIST DOCD IN RCRD: CPT | Performed by: NURSE PRACTITIONER

## 2023-04-26 RX ORDER — ONDANSETRON 4 MG/1
4 TABLET, ORALLY DISINTEGRATING ORAL EVERY 8 HOURS PRN
Qty: 20 TABLET | Refills: 0 | Status: SHIPPED | OUTPATIENT
Start: 2023-04-26

## 2023-04-26 RX ORDER — OLOPATADINE HYDROCHLORIDE 1 MG/ML
1 SOLUTION/ DROPS OPHTHALMIC 2 TIMES DAILY PRN
Qty: 5 ML | Refills: 0 | Status: SHIPPED | OUTPATIENT
Start: 2023-04-26

## 2023-04-26 NOTE — PROGRESS NOTES
Chief Complaint  ER Follow Up, Abdominal Pain, and Nausea    Subjective          Enriqueta Gotti is a 32 y.o. female who presents today to [facility] for follow up    HPI:   History of Present Illness    Presents for follow up for left lower abdominal pain and suprapubic pain that started about 3 days ago.  Was evaluated for this in ER 4/24/2023 and was discharged home after unremarkable work-up.  Today patient reports pain is a little better. Denies any contributing or alleviating factors. Associated symptoms: nausea, diarrhea. Doretha has had stomach issues since having H. Pylori in 2018. Doretha had to be treated twice for this and was not retested after treatment to verify resolution of infection. Doretha has had loose stools since having c. diff about 5 years ago. Requesting to be retested for c. Diff and h. Pylori. Requesting medication refills. Doretha is tolerating medications well with no issues or side effects.  Denies any other issues or concerns at this time.      The following portions of the patient's history were reviewed and updated as appropriate: allergies, current medications, past family history, past medical history, past social history, past surgical history and problem list.    Objective     Allergy:   Allergies   Allergen Reactions   • Codeine    • Penicillins    • Sulfa Antibiotics         Current Medications:   Current Outpatient Medications   Medication Sig Dispense Refill   • cetirizine (zyrTEC) 10 MG tablet Take 1 tablet by mouth Daily. 90 tablet 1   • hydrOXYzine (ATARAX) 25 MG tablet Take 1-2 tablets nightly as needed for sleep . 90 tablet 0   • lamoTRIgine (LaMICtal) 150 MG tablet Take 1 tablet by mouth Daily for 30 days. 30 tablet 1   • methylphenidate (Ritalin) 10 MG tablet Take 1 tablet by mouth 2 (Two) Times a Day for 30 days. 60 tablet 0   • olopatadine (PATANOL) 0.1 % ophthalmic solution Administer 1 drop to both eyes 2 (Two) Times a Day  As Needed for Allergies. 5 mL 0   • ondansetron ODT (ZOFRAN-ODT) 4 MG disintegrating tablet Place 1 tablet on the tongue Every 8 (Eight) Hours As Needed for Nausea or Vomiting. 20 tablet 0   • ProAir  (90 Base) MCG/ACT inhaler Inhale 2 puffs Every 6 (Six) Hours As Needed for Wheezing or Shortness of Air. 18 g 2   • vilazodone (Viibryd) 20 MG tablet tablet Take 1 tablet by mouth Daily for 90 days. 90 tablet 0     No current facility-administered medications for this visit.       Past Medical History:  Past Medical History:   Diagnosis Date   • ADHD (attention deficit hyperactivity disorder)    • Allergic    • Ankle sprain     right   • Anxiety    • Bipolar disorder    • Carpal tunnel syndrome    • Chronic pain disorder     Fibroymyalgia   • Depression    • Suicide attempt    • Tear of meniscus of knee     left        Past Surgical History:  Past Surgical History:   Procedure Laterality Date   • CARPAL TUNNEL RELEASE     •  SECTION     • CHOLECYSTECTOMY     • LAPAROSCOPIC TUBAL LIGATION     • MOUTH SURGERY     • TONSILLECTOMY AND ADENOIDECTOMY     • TUBAL ABDOMINAL LIGATION         Social History:  Social History     Socioeconomic History   • Marital status:    Tobacco Use   • Smoking status: Every Day     Packs/day: 0.50     Years: 3.00     Pack years: 1.50     Types: Cigarettes     Start date: 2016   • Smokeless tobacco: Never   • Tobacco comments:     Trying to quit.   Vaping Use   • Vaping Use: Never used   Substance and Sexual Activity   • Alcohol use: Yes     Comment: rarely   • Drug use: No   • Sexual activity: Defer       Family History:  Family History   Problem Relation Age of Onset   • Anxiety disorder Mother    • Bipolar disorder Mother    • Schizophrenia Brother    • Osteoarthritis Other    • Osteoporosis Other    • Heart disease Other    • Hypertension Other    • Asthma Other    • Diabetes Other    • Kidney disease Other    • Stroke Other    • ADD / ADHD Child        Review of  "Systems:  Review of Systems   Constitutional: Negative for fever.   Gastrointestinal: Positive for diarrhea and nausea. Negative for abdominal distention, blood in stool, constipation and vomiting.   Genitourinary: Negative for difficulty urinating, dyspareunia, dysuria, flank pain, frequency, hematuria, pelvic pain, urgency and vaginal discharge.       Vital Signs:   /64 (BP Location: Right arm, Patient Position: Sitting, Cuff Size: Large Adult)   Pulse 85   Temp 97.3 °F (36.3 °C) (Temporal)   Resp 16   Ht 149.9 cm (59\")   Wt 91.7 kg (202 lb 3.2 oz)   SpO2 96%   BMI 40.84 kg/m²      Physical Exam:  Physical Exam  Vitals and nursing note reviewed.   Constitutional:       General: She is not in acute distress.     Appearance: Normal appearance. She is not ill-appearing or toxic-appearing.   HENT:      Head: Normocephalic.   Cardiovascular:      Rate and Rhythm: Normal rate and regular rhythm.      Heart sounds: Normal heart sounds.   Pulmonary:      Effort: Pulmonary effort is normal. No respiratory distress.      Breath sounds: Normal breath sounds.   Abdominal:      General: Bowel sounds are normal. There is no distension.      Palpations: Abdomen is soft.      Tenderness: There is abdominal tenderness (LLQ). There is no right CVA tenderness, left CVA tenderness or guarding.   Genitourinary:     Comments: +suprapubic tenderness   Skin:     General: Skin is warm and dry.      Coloration: Skin is not pale.   Neurological:      Mental Status: She is alert and oriented to person, place, and time.   Psychiatric:         Mood and Affect: Mood normal.         Behavior: Behavior normal.         Thought Content: Thought content normal.         Judgment: Judgment normal.                  Assessment and Plan   Diagnoses and all orders for this visit:    1. Left lower quadrant abdominal pain (Primary)  -     Clostridioides difficile Toxin, PCR - Stool, Per Rectum; Future    2. Nausea  -     ondansetron ODT " (ZOFRAN-ODT) 4 MG disintegrating tablet; Place 1 tablet on the tongue Every 8 (Eight) Hours As Needed for Nausea or Vomiting.  Dispense: 20 tablet; Refill: 0  -     H. Pylori Breath Test - Breath, Lung; Future    3. Allergic conjunctivitis of both eyes  -     olopatadine (PATANOL) 0.1 % ophthalmic solution; Administer 1 drop to both eyes 2 (Two) Times a Day As Needed for Allergies.  Dispense: 5 mL; Refill: 0    4. Diarrhea, unspecified type  -     Clostridioides difficile Toxin, PCR - Stool, Per Rectum; Future      Work up continued.   BRAT diet.   Denies any PPI use in the past 2 weeks.     Discussed possible differential diagnoses, testing, treatment, recommended non-pharmacological interventions, risks, warning signs to monitor for that would indicate need for follow-up in clinic or ER. If no improvement with these regimens or you have new or worsening symptoms follow-up. Patient verbalizes understanding and agreement with plan of care. Denies further needs or concerns.     Patient was given instructions and counseling regarding her condition and for health maintenance advised.    Class 3 Severe Obesity (BMI >=40). Obesity-related health conditions include the following: obstructive sleep apnea, hypertension, coronary heart disease, diabetes mellitus, dyslipidemias, GERD and peripheral vascular disease. Obesity is improving with lifestyle modifications. BMI is is above average; BMI management plan is completed. We discussed portion control and increasing exercise.       I spent 30 minutes caring for patient on this date of service. This time includes time spent by me in the following activities: preparing for the visit, reviewing tests, obtaining and/or reviewing a separately obtained history, performing a medically appropriate examination and/or evaluation, counseling and educating the patient/family/caregiver, ordering medications, tests, or procedures and documenting information in the medical record    Meds  ordered during this visit:  New Medications Ordered This Visit   Medications   • ondansetron ODT (ZOFRAN-ODT) 4 MG disintegrating tablet     Sig: Place 1 tablet on the tongue Every 8 (Eight) Hours As Needed for Nausea or Vomiting.     Dispense:  20 tablet     Refill:  0   • olopatadine (PATANOL) 0.1 % ophthalmic solution     Sig: Administer 1 drop to both eyes 2 (Two) Times a Day As Needed for Allergies.     Dispense:  5 mL     Refill:  0       Patient Instructions:  Patient instructions given for the following visit diagnosis:    ICD-10-CM ICD-9-CM   1. Left lower quadrant abdominal pain  R10.32 789.04   2. Nausea  R11.0 787.02   3. Allergic conjunctivitis of both eyes  H10.13 372.14   4. Diarrhea, unspecified type  R19.7 787.91       Follow Up   Return in about 1 week (around 5/3/2023) for Recheck, Sooner if needed.        This document has been electronically signed by DALILA Juan  April 26, 2023 13:39 EDT    Patient was given instructions and counseling regarding her condition or for health maintenance advice. Please see specific information pulled into the AVS if appropriate.     Part of this note may be an electronic transcription/translation of spoken language to printed text using the Dragon Dictation System.

## 2023-04-28 ENCOUNTER — LAB (OUTPATIENT)
Dept: LAB | Facility: HOSPITAL | Age: 33
End: 2023-04-28
Payer: MEDICAID

## 2023-04-28 ENCOUNTER — TELEPHONE (OUTPATIENT)
Dept: FAMILY MEDICINE CLINIC | Facility: CLINIC | Age: 33
End: 2023-04-28
Payer: MEDICAID

## 2023-04-28 DIAGNOSIS — R19.7 DIARRHEA, UNSPECIFIED TYPE: ICD-10-CM

## 2023-04-28 DIAGNOSIS — R19.7 DIARRHEA, UNSPECIFIED TYPE: Primary | ICD-10-CM

## 2023-04-28 DIAGNOSIS — R10.32 LEFT LOWER QUADRANT ABDOMINAL PAIN: ICD-10-CM

## 2023-04-28 DIAGNOSIS — R11.0 NAUSEA: ICD-10-CM

## 2023-04-28 NOTE — TELEPHONE ENCOUNTER
Call patient to let her know that the C. difficile test was not done as the stool sample was solid.  Please let her know that I will reorder it and if she has loose stools again, she can have the test done.

## 2023-04-28 NOTE — TELEPHONE ENCOUNTER
Call patient to let her know that the C. difficile test was not done as the stool sample was solid.  Please let her know that I will reorder it and if she has loose stools again, she can have the test done.    Patient notified & verbalized understanding.

## 2023-05-04 ENCOUNTER — OFFICE VISIT (OUTPATIENT)
Dept: PSYCHIATRY | Facility: CLINIC | Age: 33
End: 2023-05-04
Payer: MEDICAID

## 2023-05-04 VITALS
TEMPERATURE: 98.1 F | OXYGEN SATURATION: 98 % | HEART RATE: 105 BPM | WEIGHT: 201.8 LBS | BODY MASS INDEX: 40.68 KG/M2 | DIASTOLIC BLOOD PRESSURE: 89 MMHG | HEIGHT: 59 IN | SYSTOLIC BLOOD PRESSURE: 123 MMHG

## 2023-05-04 DIAGNOSIS — F90.0 ADHD (ATTENTION DEFICIT HYPERACTIVITY DISORDER), INATTENTIVE TYPE: ICD-10-CM

## 2023-05-04 DIAGNOSIS — F33.0 MILD EPISODE OF RECURRENT MAJOR DEPRESSIVE DISORDER: Primary | ICD-10-CM

## 2023-05-04 DIAGNOSIS — F41.1 GENERALIZED ANXIETY DISORDER: ICD-10-CM

## 2023-05-04 DIAGNOSIS — Z63.9 FAMILY DYSFUNCTION: ICD-10-CM

## 2023-05-04 RX ORDER — VILAZODONE HYDROCHLORIDE 20 MG/1
20 TABLET ORAL DAILY
Qty: 90 TABLET | Refills: 0 | Status: SHIPPED | OUTPATIENT
Start: 2023-05-04 | End: 2023-08-02

## 2023-05-04 RX ORDER — METHYLPHENIDATE HYDROCHLORIDE 18 MG/1
18 TABLET ORAL DAILY
Qty: 30 TABLET | Refills: 0 | Status: SHIPPED | OUTPATIENT
Start: 2023-05-04

## 2023-05-04 RX ORDER — LAMOTRIGINE 150 MG/1
150 TABLET ORAL DAILY
Qty: 90 TABLET | Refills: 0 | Status: SHIPPED | OUTPATIENT
Start: 2023-05-04 | End: 2023-08-02

## 2023-05-04 RX ORDER — METHYLPHENIDATE HYDROCHLORIDE 10 MG/1
10 TABLET ORAL 2 TIMES DAILY
Qty: 60 TABLET | Refills: 0 | Status: SHIPPED | OUTPATIENT
Start: 2023-05-04 | End: 2023-05-04

## 2023-05-04 RX ORDER — HYDROXYZINE HYDROCHLORIDE 25 MG/1
TABLET, FILM COATED ORAL
Qty: 90 TABLET | Refills: 0 | Status: SHIPPED | OUTPATIENT
Start: 2023-05-04

## 2023-05-04 SDOH — SOCIAL STABILITY - SOCIAL INSECURITY: PROBLEM RELATED TO PRIMARY SUPPORT GROUP, UNSPECIFIED: Z63.9

## 2023-05-04 NOTE — PROGRESS NOTES
Subjective   Enriqueta Gotti is a 32 y.o. female who is here today for medication management follow-up appointment.    Chief Complaint: Anxiety.  ADHD.  Depression    HPI:  History of Present Illness       Patient denies negative side effects.  Continues to note difficulty with obtaining Ritalin related to drug shortage and wishes to transition back to Concerta for compliance..  She reports that overall mood is improved.  Depression is starting to become more stabilized.  Reports that she did trial working but was unable to continue related to her physical health concerns.  Anxiety is doing okay.  Sleep is doing well about 7 hours a night without nightmares.  No reported change in appetite. Body mass index is 40.74 kg/m².Denies self-harm.  Denies AVH.  Denies SI and HI.    Past Psych History: Inpatient admission x3; described above.  Patient reports previous outpatient medication management through [facility], [facility], most recent [place] [facility].  Patient denies a history of self-harm.  Patient denies a history of TBI or seizures.  Patient reports previous diagnosis of anxiety, depression, bipolar disorder.    Previous Psych Meds: Patient reports that she has trialed several medications in the past but is unsure of them all.  She does endorse that she had negative side effects with Effexor, Zoloft, Depakote, and Wellbutrin.  She does identify that she has previously trialed Invega but experienced an increase in prolactin levels related to this. Gene site testing was conducted at the [facility].    Substance Abuse: Patient denies history or current illicit substance use, EtOH.  She does endorse daily nicotine use in the form of cigarettes x13 years approximating 0.5 to 1 pack/day with last reported use being today.  Patient does report caffeine intake approximating 16 to 32 ounces daily.    Social History: Patient identifies that she was born and raised in [place] until she was in second grade.   Patient reports that primary custody until she was in  was to her biological father.  Identifies a strained relationship with biological mother.  Patient identifies that she was raised by paternal grandparents and adopted.  She identifies that after leaving [place] that she moved to [place], [place] then to [place], [place] then to [place].  She identifies that she then moved to [facility] x1 year.   3 times total; however, 2 marriages to the same person.  She identifies first marriage x10 months.  Second marriage 1 year.  Third marriage x3-1/2 years.  Endorses that first and third marriage is to current .  Reports previous relationships between marriage courses.  2 children.  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  High school graduate, some college.  Denies any previous or pending legal matters.      Family Psychiatric History:  family history includes ADD / ADHD in her child; Anxiety disorder in her mother; Asthma in an other family member; Bipolar disorder in her mother; Diabetes in an other family member; Heart disease in an other family member; Hypertension in an other family member; Kidney disease in an other family member; Osteoarthritis in an other family member; Osteoporosis in an other family member; Schizophrenia in her brother; Stroke in an other family member.    Medical/Surgical History:  Past Medical History:   Diagnosis Date   • ADHD (attention deficit hyperactivity disorder)    • Allergic    • Ankle sprain     right   • Anxiety    • Bipolar disorder    • Carpal tunnel syndrome    • Chronic pain disorder     Fibroymyalgia   • Depression    • Suicide attempt    • Tear of meniscus of knee     left      Past Surgical History:   Procedure Laterality Date   • CARPAL TUNNEL RELEASE     •  SECTION     • CHOLECYSTECTOMY     • LAPAROSCOPIC TUBAL LIGATION     • MOUTH SURGERY     • TONSILLECTOMY AND ADENOIDECTOMY     • TUBAL ABDOMINAL  LIGATION         Allergies   Allergen Reactions   • Codeine    • Penicillins    • Sulfa Antibiotics            Current Medications:   Current Outpatient Medications   Medication Sig Dispense Refill   • hydrOXYzine (ATARAX) 25 MG tablet Take 1-2 tablets nightly as needed for sleep . 90 tablet 0   • lamoTRIgine (LaMICtal) 150 MG tablet Take 1 tablet by mouth Daily for 90 days. 90 tablet 0   • vilazodone (Viibryd) 20 MG tablet tablet Take 1 tablet by mouth Daily for 90 days. 90 tablet 0   • cetirizine (zyrTEC) 10 MG tablet Take 1 tablet by mouth Daily. 90 tablet 1   • methylphenidate (Concerta) 18 MG CR tablet Take 1 tablet by mouth Daily 30 tablet 0   • olopatadine (PATANOL) 0.1 % ophthalmic solution Administer 1 drop to both eyes 2 (Two) Times a Day As Needed for Allergies. 5 mL 0   • ondansetron ODT (ZOFRAN-ODT) 4 MG disintegrating tablet Place 1 tablet on the tongue Every 8 (Eight) Hours As Needed for Nausea or Vomiting. 20 tablet 0   • ProAir  (90 Base) MCG/ACT inhaler Inhale 2 puffs Every 6 (Six) Hours As Needed for Wheezing or Shortness of Air. 18 g 2     No current facility-administered medications for this visit.         Review of Systems   Constitutional: Positive for activity change. Negative for fatigue.   HENT: Negative for dental problem, drooling, ear pain and sore throat.    Eyes: Negative for redness and visual disturbance.   Respiratory: Negative for chest tightness.    Cardiovascular: Negative for chest pain and palpitations.   Gastrointestinal: Negative for abdominal pain, diarrhea and nausea.   Endocrine: Negative for polydipsia and polyuria.   Genitourinary: Negative for frequency and urgency.   Musculoskeletal: Negative for back pain and gait problem.   Skin: Negative for pallor and rash.   Allergic/Immunologic: Negative for environmental allergies and immunocompromised state.   Neurological: Negative for dizziness, tremors, seizures, syncope, facial asymmetry, speech difficulty, weakness,  "light-headedness, numbness and headaches.   Hematological: Negative for adenopathy. Does not bruise/bleed easily.   Psychiatric/Behavioral: Positive for decreased concentration. Negative for agitation, behavioral problems, confusion, dysphoric mood, hallucinations, self-injury, sleep disturbance and suicidal ideas. The patient is not nervous/anxious and is not hyperactive.     denies HEENT, cardiovascular, respiratory, liver, renal, GI/, endocrine, neuro, DERM, hematology, immunology, musculoskeletal disorders.    Objective   Physical Exam  Vitals reviewed.   Constitutional:       Appearance: Normal appearance. She is obese.   HENT:      Head: Normocephalic.      Nose: Nose normal.      Mouth/Throat:      Mouth: Mucous membranes are moist.      Pharynx: Oropharynx is clear.   Eyes:      Extraocular Movements: Extraocular movements intact.      Pupils: Pupils are equal, round, and reactive to light.   Cardiovascular:      Rate and Rhythm: Normal rate.   Pulmonary:      Effort: Pulmonary effort is normal.   Musculoskeletal:         General: Normal range of motion.      Cervical back: Normal range of motion.   Skin:     General: Skin is warm and dry.   Neurological:      General: No focal deficit present.      Mental Status: She is alert and oriented to person, place, and time. Mental status is at baseline.   Psychiatric:         Attention and Perception: Attention and perception normal.         Mood and Affect: Mood is not depressed. Affect is tearful.         Speech: Speech normal.         Behavior: Behavior normal. Behavior is cooperative.         Thought Content: Thought content normal.         Cognition and Memory: Cognition and memory normal.         Judgment: Judgment normal.       Blood pressure 123/89, pulse 105, temperature 98.1 °F (36.7 °C), height 149.9 cm (59.02\"), weight 91.5 kg (201 lb 12.8 oz), SpO2 98 %.    Mental Status Exam:   Hygiene:   good  Cooperation:  Cooperative  Eye Contact:  " Good  Psychomotor Behavior:  Appropriate  Affect:  Appropriate  Hopelessness: Denies  Speech:  Normal  Thought Process:  Goal directed and Linear  Thought Content:  Normal and Mood congruent  Suicidal:  None  Homicidal:  None  Hallucinations:  None  Delusion:  None  Memory:  Intact  Orientation:  Person, Place, Time and Situation  Reliability:  fair  Insight:  Fair  Judgement:  Fair  Impulse Control:  Fair  Physical/Medical Issues:  No       Short-term goals: Patient will be compliant with clinic appointments.  Patient will be engaged in therapy, medication compliant with minimal side effects. Patient  will report decrease of symptoms and frequency.    Long-term goals: Patient will have minimal symptoms of  with continued medication management. Patient will be compliant with treatment and appointments.     Strengths: Motivation for treatment, insight  Weaknesses: Support, strained family dynamics, recent loss    Functional Status: moderate impairment in areas of daily functioning.  Prognosis: Guarded dependent on medication/follow up and treatment plan compliance.    Enriqueta Gotti  reports that she has been smoking cigarettes. She started smoking about 7 years ago. She has a 1.50 pack-year smoking history. She has never used smokeless tobacco.. I have educated her on the risk of diseases from using tobacco products such as cancer, COPD, heart disease and reproductive problems.     I advised her to quit and she is not willing to quit.    I spent 3  minutes counseling the patient.    Assessment & Plan   Diagnoses and all orders for this visit:    1. Mild episode of recurrent major depressive disorder (Primary)  -     lamoTRIgine (LaMICtal) 150 MG tablet; Take 1 tablet by mouth Daily for 90 days.  Dispense: 90 tablet; Refill: 0  -     vilazodone (Viibryd) 20 MG tablet tablet; Take 1 tablet by mouth Daily for 90 days.  Dispense: 90 tablet; Refill: 0  -     Ambulatory Referral to Psychotherapy    2. ADHD  (attention deficit hyperactivity disorder), inattentive type  -     Discontinue: methylphenidate (Ritalin) 10 MG tablet; Take 1 tablet by mouth 2 (Two) Times a Day for 30 days.  Dispense: 60 tablet; Refill: 0  -     methylphenidate (Concerta) 18 MG CR tablet; Take 1 tablet by mouth Daily  Dispense: 30 tablet; Refill: 0    3. Generalized anxiety disorder  -     lamoTRIgine (LaMICtal) 150 MG tablet; Take 1 tablet by mouth Daily for 90 days.  Dispense: 90 tablet; Refill: 0  -     hydrOXYzine (ATARAX) 25 MG tablet; Take 1-2 tablets nightly as needed for sleep .  Dispense: 90 tablet; Refill: 0  -     vilazodone (Viibryd) 20 MG tablet tablet; Take 1 tablet by mouth Daily for 90 days.  Dispense: 90 tablet; Refill: 0  -     Ambulatory Referral to Psychotherapy    4. Family dysfunction  -     Ambulatory Referral to Psychotherapy         -UDS results reviewed from February, negative for methylphenidate, which is consistent as patient was unable to find a medication related to it being unavailable at pharmacies. will redo UDS at next encounter.  -Elieser reviewed and appropriate    -Continue Viibryd 20 mg p.o. daily for anxiety and depression  -Continue Lamictal 150 mg p.o. daily for mood  -Discontinue Ritalin 10 mg p.o. twice daily for ADHD related to drug  -Start Concerta 18 mg p.o. every morning for attention and focus  -Continue hydroxyzine 25 to 50 mg p.o. nightly as needed for anxiety and sleep  -Recommend psychotherapy with LCSW: Referral placed  -Medications sent to pharmacy at this time    Discussed medication and psychotherapy options.  Overall mood is doing well on current regimen.  She is to continue Viibryd, Lamictal, hydroxyzine without change.  Going to transition from Ritalin to Concerta for compliance and drug availability.Reintegrated side effects associated with the above medications, patient does verbalize understanding. Patient is aware to contact the [facility] with any worsening of symptom.  Patient  is agreeable to go to the ER or call 911 should they begin SI/HI.    SUPPORTIVE PSYCHOTHERAPY: continuing efforts to promote the therapeutic alliance, address the patient’s issues, and strengthen self awareness, insights, and coping skills.Assisted patient in processing above session content; acknowledged and normalized patient’s thoughts, feelings, and concerns.  Applied  positive coping skills and behavior management in session.Allowed patient to freely discuss issues without interruption or judgment. Provided safe, confidential environment to facilitate the development of positive therapeutic relationship and encourage open, honest communication. Assisted patient in identifying risk factors which would indicate the need for higher level of care including thoughts to harm self or others and/or self-harming behavior and encouraged patient to contact this office, call 911, or present to the nearest emergency room should any of these events occur. Discussed crisis intervention services and means to access.  Patient adamantly and convincingly denies current suicidal or homicidal ideation or perceptual disturbance.    Return in about 3 months (around 8/4/2023), or if symptoms worsen or fail to improve, for Next scheduled follow up.      This document has been electronically signed by DALILA Guthrie   May 8, 2023 09:04 EDT   Errors in dictation may reflect use of voice recognition software and not all errors in transcription may have been detected prior to signing.      I spent a total of 56 minutes face-to-face with patient discussing treatment options, diagnosis, medication, ordering medications, etc.

## 2023-05-08 ENCOUNTER — TELEPHONE (OUTPATIENT)
Dept: FAMILY MEDICINE CLINIC | Facility: CLINIC | Age: 33
End: 2023-05-08
Payer: MEDICAID

## 2023-05-08 ENCOUNTER — OFFICE VISIT (OUTPATIENT)
Dept: FAMILY MEDICINE CLINIC | Facility: CLINIC | Age: 33
End: 2023-05-08
Payer: MEDICAID

## 2023-05-08 VITALS
OXYGEN SATURATION: 99 % | DIASTOLIC BLOOD PRESSURE: 70 MMHG | HEART RATE: 78 BPM | SYSTOLIC BLOOD PRESSURE: 116 MMHG | BODY MASS INDEX: 41.41 KG/M2 | WEIGHT: 205.4 LBS | HEIGHT: 59 IN | TEMPERATURE: 98 F

## 2023-05-08 DIAGNOSIS — Z11.51 SCREENING FOR HPV (HUMAN PAPILLOMAVIRUS): Primary | ICD-10-CM

## 2023-05-08 PROBLEM — R42 VERTIGO: Status: ACTIVE | Noted: 2023-05-08

## 2023-05-08 PROBLEM — Q38.2 MACROGLOSSIA: Status: ACTIVE | Noted: 2023-05-08

## 2023-05-08 PROBLEM — Z72.0 TOBACCO ABUSE: Status: ACTIVE | Noted: 2023-05-08

## 2023-05-10 NOTE — PROGRESS NOTES
"SUBJECTIVE:   32 y.o. female for annual routine Pap and checkup.  Current Outpatient Medications   Medication Sig Dispense Refill   • cetirizine (zyrTEC) 10 MG tablet Take 1 tablet by mouth Daily. 90 tablet 1   • hydrOXYzine (ATARAX) 25 MG tablet Take 1-2 tablets nightly as needed for sleep . 90 tablet 0   • lamoTRIgine (LaMICtal) 150 MG tablet Take 1 tablet by mouth Daily for 90 days. 90 tablet 0   • methylphenidate (Concerta) 18 MG CR tablet Take 1 tablet by mouth Daily 30 tablet 0   • olopatadine (PATANOL) 0.1 % ophthalmic solution Administer 1 drop to both eyes 2 (Two) Times a Day As Needed for Allergies. 5 mL 0   • ondansetron ODT (ZOFRAN-ODT) 4 MG disintegrating tablet Place 1 tablet on the tongue Every 8 (Eight) Hours As Needed for Nausea or Vomiting. 20 tablet 0   • ProAir  (90 Base) MCG/ACT inhaler Inhale 2 puffs Every 6 (Six) Hours As Needed for Wheezing or Shortness of Air. 18 g 2   • vilazodone (Viibryd) 20 MG tablet tablet Take 1 tablet by mouth Daily for 90 days. 90 tablet 0     No current facility-administered medications for this visit.     Allergies: Codeine, Penicillins, and Sulfa antibiotics   No LMP recorded.    ROS:  Feeling well. No dyspnea or chest pain on exertion.  No abdominal pain, change in bowel habits, black or bloody stools.  No urinary tract symptoms. GYN ROS: normal menses, no abnormal bleeding, pelvic pain or discharge, no breast pain or new or enlarging lumps on self exam. No neurological complaints.    OBJECTIVE:   The patient appears well, alert, oriented x 3, in no distress.  /70 (BP Location: Right arm, Patient Position: Sitting)   Pulse 78   Temp 98 °F (36.7 °C) (Temporal)   Ht 149.9 cm (59.02\")   Wt 93.2 kg (205 lb 6.4 oz)   SpO2 99%   BMI 41.46 kg/m²   ENT normal.  Neck supple. No adenopathy or thyromegaly. CATARINO. Lungs are clear, good air entry, no wheezes, rhonchi or rales. S1 and S2 normal, no murmurs, regular rate and rhythm. Abdomen soft without " tenderness, guarding, mass or organomegaly. Extremities show no edema, normal peripheral pulses. Neurological is normal, no focal findings.    BREAST EXAM: not examined    PELVIC EXAM: normal external genitalia, vulva, vagina, cervix, uterus and adnexa    Patient's Body mass index is 41.46 kg/m². indicating that she is morbidly obese (BMI > 40 or > 35 with obesity - related health condition). Obesity-related health conditions include the following: none. Obesity is unchanged. BMI is is above average; BMI management plan is completed. We discussed low calorie, low carb based diet program, portion control and increasing exercise..    ASSESSMENT:   well woman    PLAN:   pap smear  return annually or prn       This document has been electronically signed by DALILA Torres  May 10, 2023 15:20 EDT

## 2023-05-12 ENCOUNTER — TRANSCRIBE ORDERS (OUTPATIENT)
Dept: FAMILY MEDICINE CLINIC | Facility: CLINIC | Age: 33
End: 2023-05-12
Payer: MEDICAID

## 2023-05-12 ENCOUNTER — LAB (OUTPATIENT)
Dept: LAB | Facility: HOSPITAL | Age: 33
End: 2023-05-12
Payer: MEDICAID

## 2023-05-12 DIAGNOSIS — R19.7 DIARRHEA, UNSPECIFIED TYPE: ICD-10-CM

## 2023-05-12 DIAGNOSIS — R11.0 NAUSEA: Primary | ICD-10-CM

## 2023-05-12 DIAGNOSIS — R11.0 NAUSEA: ICD-10-CM

## 2023-05-12 LAB
027 TOXIN: NORMAL
C DIFF TOX GENS STL QL NAA+PROBE: NEGATIVE

## 2023-05-12 PROCEDURE — 87493 C DIFF AMPLIFIED PROBE: CPT

## 2023-05-12 PROCEDURE — 83013 H PYLORI (C-13) BREATH: CPT

## 2023-05-12 RX ORDER — ONDANSETRON 4 MG/1
4 TABLET, ORALLY DISINTEGRATING ORAL EVERY 8 HOURS PRN
Qty: 30 TABLET | Refills: 0 | Status: SHIPPED | OUTPATIENT
Start: 2023-05-12

## 2023-05-12 NOTE — TELEPHONE ENCOUNTER
Caller: Enriqueta Gotti    Relationship: Self    Best call back number: [phone]    Requested Prescriptions:   Requested Prescriptions     Pending Prescriptions Disp Refills   • ondansetron ODT (ZOFRAN-ODT) 4 MG disintegrating tablet 20 tablet 0     Sig: Place 1 tablet on the tongue Every 8 (Eight) Hours As Needed for Nausea or Vomiting.        Pharmacy where request should be sent: [facility] [phone] [place], [place] [phone] FX     Last office visit with prescribing clinician: 4/26/2023   Last telemedicine visit with prescribing clinician: 5/8/2023   Next office visit with prescribing clinician: 5/16/2023     Additional details provided by patient: PLEASE REFILL 90 DAY SUPPLY WITH REFILLS    Does the patient have less than a 3 day supply:  [x] Yes  [] No OUT  Would you like a call back once the refill request has been completed: [] Yes [x] No    If the office needs to give you a call back, can they leave a voicemail: [x] Yes [] No    Sharon Zapata Rep   05/12/23 09:12 EDT

## 2023-05-14 LAB — UREA BREATH TEST QL: POSITIVE

## 2023-05-16 ENCOUNTER — TELEPHONE (OUTPATIENT)
Dept: FAMILY MEDICINE CLINIC | Facility: CLINIC | Age: 33
End: 2023-05-16
Payer: MEDICAID

## 2023-05-16 ENCOUNTER — OFFICE VISIT (OUTPATIENT)
Dept: FAMILY MEDICINE CLINIC | Facility: CLINIC | Age: 33
End: 2023-05-16
Payer: MEDICAID

## 2023-05-16 ENCOUNTER — TELEPHONE (OUTPATIENT)
Dept: FAMILY MEDICINE CLINIC | Facility: CLINIC | Age: 33
End: 2023-05-16

## 2023-05-16 VITALS
DIASTOLIC BLOOD PRESSURE: 78 MMHG | HEIGHT: 59 IN | WEIGHT: 203.4 LBS | OXYGEN SATURATION: 96 % | SYSTOLIC BLOOD PRESSURE: 100 MMHG | BODY MASS INDEX: 41 KG/M2 | HEART RATE: 95 BPM | TEMPERATURE: 97.8 F

## 2023-05-16 DIAGNOSIS — A04.8 H. PYLORI INFECTION: Primary | ICD-10-CM

## 2023-05-16 LAB
AGE GDLN ACOG TESTING: NORMAL
CYTOLOGIST CVX/VAG CYTO: NORMAL
CYTOLOGY CVX/VAG DOC CYTO: NORMAL
CYTOLOGY CVX/VAG DOC THIN PREP: NORMAL
DX ICD CODE: NORMAL
HIV 1 & 2 AB SER-IMP: NORMAL
HPV GENOTYPE REFLEX: NORMAL
HPV I/H RISK 4 DNA CVX QL PROBE+SIG AMP: NEGATIVE
OTHER STN SPEC: NORMAL
STAT OF ADQ CVX/VAG CYTO-IMP: NORMAL

## 2023-05-16 PROCEDURE — 1160F RVW MEDS BY RX/DR IN RCRD: CPT | Performed by: NURSE PRACTITIONER

## 2023-05-16 PROCEDURE — 99213 OFFICE O/P EST LOW 20 MIN: CPT | Performed by: NURSE PRACTITIONER

## 2023-05-16 PROCEDURE — 1159F MED LIST DOCD IN RCRD: CPT | Performed by: NURSE PRACTITIONER

## 2023-05-16 RX ORDER — CLARITHROMYCIN 500 MG/1
500 TABLET, COATED ORAL 2 TIMES DAILY
Qty: 28 TABLET | Refills: 0 | Status: SHIPPED | OUTPATIENT
Start: 2023-05-16 | End: 2023-05-30

## 2023-05-16 RX ORDER — OMEPRAZOLE 20 MG/1
20 CAPSULE, DELAYED RELEASE ORAL 2 TIMES DAILY
Qty: 28 CAPSULE | Refills: 0 | Status: SHIPPED | OUTPATIENT
Start: 2023-05-16 | End: 2023-05-30

## 2023-05-16 RX ORDER — METRONIDAZOLE 500 MG/1
500 TABLET ORAL 3 TIMES DAILY
Qty: 42 TABLET | Refills: 0 | Status: SHIPPED | OUTPATIENT
Start: 2023-05-16 | End: 2023-05-30

## 2023-05-16 NOTE — TELEPHONE ENCOUNTER
----- Message from DALILA Juan sent at 5/16/2023  8:18 AM EDT -----  Please call patient regarding results.     Positive for H. pylori.  Treatment sent to pharmacy.  Take as directed.  Avoid alcohol use while taking metronidazole and for 2 days after finishing treatment.  Recommend to follow-up 4 weeks after finishing treatment to be retested to make sure infection has resolved.      Patient notified & verbalized understanding.

## 2023-05-16 NOTE — TELEPHONE ENCOUNTER
----- Message from DALILA Torres sent at 5/16/2023  3:22 PM EDT -----  Please let patient know results are normal. Thank you.         Patient notified.

## 2023-05-16 NOTE — PROGRESS NOTES
Chief Complaint  h pylori    Subjective          Enriqueta Gotti is a 32 y.o. female who presents today to [facility] for follow up    HPI:   History of Present Illness    Presents to clinic for follow-up.  Patient tested positive for H. pylori and treatment was sent to pharmacy this morning. Today patient reports she still having nausea. Denies any other issues or concerns at this time.      The following portions of the patient's history were reviewed and updated as appropriate: allergies, current medications, past family history, past medical history, past social history, past surgical history and problem list.    Objective     Allergy:   Allergies   Allergen Reactions   • Codeine    • Penicillins    • Sulfa Antibiotics         Current Medications:   Current Outpatient Medications   Medication Sig Dispense Refill   • cetirizine (zyrTEC) 10 MG tablet Take 1 tablet by mouth Daily. (Patient not taking: Reported on 5/16/2023) 90 tablet 1   • clarithromycin (BIAXIN) 500 MG tablet Take 1 tablet by mouth 2 (Two) Times a Day for 14 days. (Patient not taking: Reported on 5/16/2023) 28 tablet 0   • hydrOXYzine (ATARAX) 25 MG tablet Take 1-2 tablets nightly as needed for sleep . (Patient not taking: Reported on 5/16/2023) 90 tablet 0   • lamoTRIgine (LaMICtal) 150 MG tablet Take 1 tablet by mouth Daily for 90 days. (Patient not taking: Reported on 5/16/2023) 90 tablet 0   • methylphenidate (Concerta) 18 MG CR tablet Take 1 tablet by mouth Daily (Patient not taking: Reported on 5/16/2023) 30 tablet 0   • metroNIDAZOLE (FLAGYL) 500 MG tablet Take 1 tablet by mouth 3 (Three) Times a Day for 14 days. (Patient not taking: Reported on 5/16/2023) 42 tablet 0   • olopatadine (PATANOL) 0.1 % ophthalmic solution Administer 1 drop to both eyes 2 (Two) Times a Day As Needed for Allergies. (Patient not taking: Reported on 5/16/2023) 5 mL 0   • omeprazole (priLOSEC) 20 MG capsule Take 1 capsule by  mouth 2 (Two) Times a Day for 14 days. (Patient not taking: Reported on 2023) 28 capsule 0   • ondansetron ODT (ZOFRAN-ODT) 4 MG disintegrating tablet Place 1 tablet on the tongue Every 8 (Eight) Hours As Needed for Nausea or Vomiting. (Patient not taking: Reported on 2023) 30 tablet 0   • ProAir  (90 Base) MCG/ACT inhaler Inhale 2 puffs Every 6 (Six) Hours As Needed for Wheezing or Shortness of Air. (Patient not taking: Reported on 2023) 18 g 2   • vilazodone (Viibryd) 20 MG tablet tablet Take 1 tablet by mouth Daily for 90 days. (Patient not taking: Reported on 2023) 90 tablet 0     No current facility-administered medications for this visit.       Past Medical History:  Past Medical History:   Diagnosis Date   • ADHD (attention deficit hyperactivity disorder)    • Allergic    • Ankle sprain     right   • Anxiety    • Bipolar disorder    • Carpal tunnel syndrome    • Chronic pain disorder     Fibroymyalgia   • Depression    • Suicide attempt    • Tear of meniscus of knee     left        Past Surgical History:  Past Surgical History:   Procedure Laterality Date   • CARPAL TUNNEL RELEASE     •  SECTION     • CHOLECYSTECTOMY     • LAPAROSCOPIC TUBAL LIGATION     • MOUTH SURGERY     • TONSILLECTOMY AND ADENOIDECTOMY     • TUBAL ABDOMINAL LIGATION         Social History:  Social History     Socioeconomic History   • Marital status:    Tobacco Use   • Smoking status: Every Day     Packs/day: 0.50     Years: 3.00     Pack years: 1.50     Types: Cigarettes     Start date: 2016   • Smokeless tobacco: Never   • Tobacco comments:     Trying to quit.   Vaping Use   • Vaping Use: Never used   Substance and Sexual Activity   • Alcohol use: Yes     Comment: rarely   • Drug use: No   • Sexual activity: Defer       Family History:  Family History   Problem Relation Age of Onset   • Anxiety disorder Mother    • Bipolar disorder Mother    • Schizophrenia Brother    • Osteoarthritis  "Other    • Osteoporosis Other    • Heart disease Other    • Hypertension Other    • Asthma Other    • Diabetes Other    • Kidney disease Other    • Stroke Other    • ADD / ADHD Child        Review of Systems:  Review of Systems   Constitutional: Negative for fever.   Gastrointestinal: Positive for nausea. Negative for vomiting.       Vital Signs:   /78 (BP Location: Right arm, Patient Position: Sitting, Cuff Size: Large Adult)   Pulse 95   Temp 97.8 °F (36.6 °C) (Temporal)   Ht 149.9 cm (59\")   Wt 92.3 kg (203 lb 6.4 oz)   SpO2 96%   BMI 41.08 kg/m²  RR: 17    Physical Exam:  Physical Exam  Vitals and nursing note reviewed.   Constitutional:       General: She is not in acute distress.     Appearance: Normal appearance. She is not ill-appearing or toxic-appearing.   HENT:      Head: Normocephalic.   Cardiovascular:      Rate and Rhythm: Normal rate and regular rhythm.      Heart sounds: Normal heart sounds.   Pulmonary:      Effort: Pulmonary effort is normal. No respiratory distress.      Breath sounds: Normal breath sounds.   Abdominal:      General: Bowel sounds are normal.      Palpations: Abdomen is soft.   Skin:     General: Skin is warm and dry.      Coloration: Skin is not pale.   Neurological:      Mental Status: She is alert and oriented to person, place, and time.   Psychiatric:         Mood and Affect: Mood normal.         Behavior: Behavior normal.         Thought Content: Thought content normal.         Judgment: Judgment normal.                  Assessment and Plan   Diagnoses and all orders for this visit:    1. H. pylori infection (Primary)      Start treatment regimen as directed.  Follow-up 4 weeks after completing treatment to be retested to ensure resolution of infection.  Recheck WBC after treatment for h pylori.   Patient reports she is not currently taking her medications as she was instructed to stop as she has allergy testing today. Plans to resume current medication regimen " after allergy testing.   Discussed possible differential diagnoses, testing, treatment, recommended non-pharmacological interventions, risks, warning signs to monitor for that would indicate need for follow-up in clinic or ER. If no improvement with these regimens or you have new or worsening symptoms follow-up. Patient verbalizes understanding and agreement with plan of care. Denies further needs or concerns.     Patient was given instructions and counseling regarding her condition and for health maintenance advised.    I spent 20 minutes caring for patient on this date of service. This time includes time spent by me in the following activities: preparing for the visit, reviewing tests, obtaining and/or reviewing a separately obtained history, performing a medically appropriate examination and/or evaluation, counseling and educating the patient/family/caregiver, ordering medications, tests, or procedures and documenting information in the medical record    Meds ordered during this visit:  No orders of the defined types were placed in this encounter.      Patient Instructions:  Patient instructions given for the following visit diagnosis:    ICD-10-CM ICD-9-CM   1. H. pylori infection  A04.8 041.86       Follow Up   Return in about 6 weeks (around 6/27/2023) for Recheck, Sooner if needed.        This document has been electronically signed by DALILA Juan  May 16, 2023 10:09 EDT    Patient was given instructions and counseling regarding her condition or for health maintenance advice. Please see specific information pulled into the AVS if appropriate.     Part of this note may be an electronic transcription/translation of spoken language to printed text using the Dragon Dictation System.

## 2023-06-12 ENCOUNTER — TELEPHONE (OUTPATIENT)
Dept: FAMILY MEDICINE CLINIC | Facility: CLINIC | Age: 33
End: 2023-06-12
Payer: MEDICAID

## 2023-10-02 NOTE — TELEPHONE ENCOUNTER
NEED'S REFILLS ON RITALIN, CLONIDINE,VIIBRYD   [facility] called stating that in order for insurance to cover the patient's Free Style Edel sensor, they need an order for the reader as well.    21

## 2024-01-08 NOTE — PROGRESS NOTES
"Chief Complaint  Rash (On B hands and arms) and Edema (B hands and fingers)    Subjective          Enriqueta Gotti presents to [facility]  Rash  This is a new problem. The current episode started in the past 7 days. The affected locations include the left arm, right arm, left hand and right hand. The rash is characterized by redness, itchiness and swelling. She was exposed to chemicals and a new detergent/soap (started a new job, has been washing dishes). Past treatments include moisturizer. The treatment provided mild relief.     States she has had a headache for the past couple days, would like something to help with the pain.     Review of Systems   Skin: Positive for rash.         Objective   Vital Signs:   /74 (BP Location: Right arm, Patient Position: Sitting, Cuff Size: Adult)   Pulse 92   Temp 98.6 °F (37 °C) (Temporal)   Resp 18   Ht 149.9 cm (59\")   Wt 92.6 kg (204 lb 3.2 oz)   SpO2 100%   BMI 41.24 kg/m²     Physical Exam  Constitutional:       General: She is not in acute distress.     Appearance: Normal appearance. She is well-developed and well-groomed. She is not ill-appearing, toxic-appearing or diaphoretic.   HENT:      Head: Normocephalic.      Right Ear: Tympanic membrane, ear canal and external ear normal.      Left Ear: Tympanic membrane, ear canal and external ear normal.      Nose: Nose normal. No congestion or rhinorrhea.      Mouth/Throat:      Mouth: Mucous membranes are moist.      Pharynx: Oropharynx is clear. No oropharyngeal exudate or posterior oropharyngeal erythema.   Eyes:      General: Lids are normal.         Right eye: No discharge.         Left eye: No discharge.      Extraocular Movements: Extraocular movements intact.      Pupils: Pupils are equal, round, and reactive to light.   Neck:      Vascular: No carotid bruit.   Cardiovascular:      Rate and Rhythm: Normal rate and regular rhythm.      Pulses: Normal pulses.      " Heart sounds: Normal heart sounds. No murmur heard.    No friction rub. No gallop.   Pulmonary:      Effort: Pulmonary effort is normal. No respiratory distress.      Breath sounds: Normal breath sounds. No stridor. No wheezing, rhonchi or rales.   Chest:      Chest wall: No tenderness.   Abdominal:      General: Bowel sounds are normal. There is no distension.      Palpations: Abdomen is soft. There is no mass.      Tenderness: There is no abdominal tenderness. There is no right CVA tenderness, left CVA tenderness, guarding or rebound.      Hernia: No hernia is present.   Musculoskeletal:         General: No swelling or tenderness. Normal range of motion.      Cervical back: Normal range of motion and neck supple. No rigidity or tenderness.      Right lower leg: No edema.      Left lower leg: No edema.   Lymphadenopathy:      Cervical: No cervical adenopathy.   Skin:     General: Skin is warm.      Capillary Refill: Capillary refill takes less than 2 seconds.      Coloration: Skin is not jaundiced.      Findings: Rash present. No bruising or erythema.   Neurological:      General: No focal deficit present.      Mental Status: She is alert and oriented to person, place, and time.      Motor: Motor function is intact. No weakness.      Coordination: Coordination is intact.      Gait: Gait is intact. Gait normal.   Psychiatric:         Attention and Perception: Attention normal.         Mood and Affect: Mood normal.         Speech: Speech normal.         Behavior: Behavior normal.         Cognition and Memory: Cognition normal.         Judgment: Judgment normal.        Result Review :                 Assessment and Plan    Diagnoses and all orders for this visit:    1. Nausea (Primary)  -     ondansetron ODT (ZOFRAN-ODT) 4 MG disintegrating tablet; Place 1 tablet on the tongue Every 8 (Eight) Hours As Needed for Nausea or Vomiting.  Dispense: 20 tablet; Refill: 0    2. Migraine without aura and without status  migrainosus, not intractable  -     ondansetron ODT (ZOFRAN-ODT) 4 MG disintegrating tablet; Place 1 tablet on the tongue Every 8 (Eight) Hours As Needed for Nausea or Vomiting.  Dispense: 20 tablet; Refill: 0    3. Rash  -     triamcinolone (KENALOG) 0.1 % ointment; Apply 1 application topically to the appropriate area as directed 2 (Two) Times a Day.  Dispense: 30 g; Refill: 1      Patient's Body mass index is 41.24 kg/m². indicating that she is morbidly obese (BMI > 40 or > 35 with obesity - related health condition). Obesity-related health conditions include the following: none. Obesity is unchanged. BMI is is above average; BMI management plan is completed. We discussed low calorie, low carb based diet program, portion control and increasing exercise..    Follow Up   No follow-ups on file.  Patient was given instructions and counseling regarding her condition or for health maintenance advice. Please see specific information pulled into the AVS if appropriate.     This document has been electronically signed by DALILA Torres  April 12, 2023 09:57 EDT        [[email or web address]] : Unless mentioned in HPI- Denies fever, chills and fatigue. Eyes:  Denies eye pain, vision problems and itching. Cardiovascular:  Denies chest pain, palpitations and lower extremity edema. Respiratory:  Denies shortness of breath, wheezing, cough and dyspnea on exertion. Gastrointestinal:  Denies abdominal pain, nausea, constipation, diarrhea and vomiting. Neurological:  Denies headache, dizziness, ataxia and memory loss. Psychiatric:  Denies suicidal ideations, insomnia, anxiety and depression.